# Patient Record
Sex: FEMALE | NOT HISPANIC OR LATINO | Employment: OTHER | URBAN - METROPOLITAN AREA
[De-identification: names, ages, dates, MRNs, and addresses within clinical notes are randomized per-mention and may not be internally consistent; named-entity substitution may affect disease eponyms.]

---

## 2017-01-07 DIAGNOSIS — K21.9 GASTROESOPHAGEAL REFLUX DISEASE WITHOUT ESOPHAGITIS: ICD-10-CM

## 2017-01-07 RX ORDER — OMEPRAZOLE 20 MG/1
CAPSULE, DELAYED RELEASE ORAL
Qty: 180 CAPSULE | Refills: 0 | OUTPATIENT
Start: 2017-01-07

## 2017-05-31 ENCOUNTER — OFFICE VISIT (OUTPATIENT)
Dept: OPTOMETRY | Facility: CLINIC | Age: 72
End: 2017-05-31
Payer: MEDICARE

## 2017-05-31 DIAGNOSIS — H10.13 ALLERGIC CONJUNCTIVITIS, BILATERAL: ICD-10-CM

## 2017-05-31 DIAGNOSIS — H52.7 REFRACTIVE ERROR: ICD-10-CM

## 2017-05-31 DIAGNOSIS — E11.9 TYPE 2 DIABETES MELLITUS WITHOUT RETINOPATHY: Primary | ICD-10-CM

## 2017-05-31 DIAGNOSIS — Z96.1 PSEUDOPHAKIA, BOTH EYES: ICD-10-CM

## 2017-05-31 DIAGNOSIS — H04.123 DRY EYE SYNDROME, BILATERAL: ICD-10-CM

## 2017-05-31 PROCEDURE — 92014 COMPRE OPH EXAM EST PT 1/>: CPT | Mod: S$GLB,,, | Performed by: OPTOMETRIST

## 2017-05-31 PROCEDURE — 99999 PR PBB SHADOW E&M-EST. PATIENT-LVL II: CPT | Mod: PBBFAC,,, | Performed by: OPTOMETRIST

## 2017-05-31 RX ORDER — OLOPATADINE HYDROCHLORIDE 2 MG/ML
1 SOLUTION/ DROPS OPHTHALMIC DAILY PRN
Qty: 1 BOTTLE | Refills: 6 | Status: SHIPPED | OUTPATIENT
Start: 2017-05-31 | End: 2018-02-01 | Stop reason: CLARIF

## 2017-05-31 NOTE — PROGRESS NOTES
Subjective:       Patient ID: Brianne Martin is a 72 y.o. female      Chief Complaint   Patient presents with    Diabetic Eye Exam     IDDM 2; A1c = 9.6 (3/16/15),      History of Present Illness   Dls:  8/3/15 Dr. Medellin    Pt here for diabetic and hypertensive eye exam. She is seeing an outside   doctor for her diabetes. Reports her sugars are doing well. Does not know   most recent A1c reading.  Pt c/o blurry vision at distance and near ou. Pt wears otc readers.   Declines refraction today. Pt c/o dry eyes ou, itching ou off/on,no   burning no pain no ha's no floaters.     Eye meds:  pataday ou prn   otc gtts ou prn     Hemoglobin A1C       Date                     Value               Ref Range           Status                03/16/2015               9.6 (H)             4.5 - 6.2 %         Final                 09/13/2014               8.2 (H)             4.5 - 6.2 %         Final                05/08/2014               9.5 (H)             4.5 - 6.2 %         Final            ----------     Assessment/Plan:     1. Type 2 diabetes mellitus without retinopathy  No diabetic retinopathy. Educated patient on importance of good blood sugar control, compliance with meds, and follow up care with PCP. Return in 1 year for dilated eye exam, sooner PRN.    2. Allergic conjunctivitis, bilateral  Continue pataday QD OU PRN. Refills sent to pharmacy.    - olopatadine (PATADAY) 0.2 % Drop, Place 1 drop into both eyes daily as needed., Disp: 1 Bottle, Rfl: 6    3. Dry eye syndrome, bilateral  Recommend artificial tears. 1 drop 4x per day and PRN. Pt currently using QD OU.Discussed different drop options - AT/PFAT/gel/ointment. Chronicity of disease and treatment discussed.    4. Pseudophakia, both eyes  Well-centered. Clear.     5. Refractive error  Patient declined refraction today, continue with current spectacles.       Return in about 1 year (around 5/31/2018), or if symptoms worsen or fail to improve, for Diabetic  Eye Exam.

## 2018-02-01 NOTE — PROGRESS NOTES
Pt previously taking pataday. Insurance requiring PA. Will switch to pazeo QD OU - listed on formulary.

## 2018-06-21 ENCOUNTER — OFFICE VISIT (OUTPATIENT)
Dept: OPTOMETRY | Facility: CLINIC | Age: 73
End: 2018-06-21
Payer: COMMERCIAL

## 2018-06-21 DIAGNOSIS — E11.9 DIABETIC EYE EXAM: Primary | ICD-10-CM

## 2018-06-21 DIAGNOSIS — H10.13 ALLERGIC CONJUNCTIVITIS, BILATERAL: ICD-10-CM

## 2018-06-21 DIAGNOSIS — Z96.1 PSEUDOPHAKIA, BOTH EYES: ICD-10-CM

## 2018-06-21 DIAGNOSIS — Z01.00 DIABETIC EYE EXAM: Primary | ICD-10-CM

## 2018-06-21 PROCEDURE — 92014 COMPRE OPH EXAM EST PT 1/>: CPT | Mod: S$GLB,,, | Performed by: OPTOMETRIST

## 2018-06-21 PROCEDURE — 99999 PR PBB SHADOW E&M-EST. PATIENT-LVL II: CPT | Mod: PBBFAC,,, | Performed by: OPTOMETRIST

## 2018-06-21 RX ORDER — VENLAFAXINE 75 MG/1
75 TABLET ORAL 2 TIMES DAILY
COMMUNITY
End: 2021-03-17 | Stop reason: SDUPTHER

## 2018-06-21 RX ORDER — ACETAMINOPHEN 500 MG
5000 TABLET ORAL DAILY
COMMUNITY
End: 2020-01-13 | Stop reason: SDUPTHER

## 2018-06-21 RX ORDER — SIMVASTATIN 40 MG/1
40 TABLET, FILM COATED ORAL NIGHTLY
COMMUNITY
End: 2020-01-13 | Stop reason: SDUPTHER

## 2018-06-21 RX ORDER — ASPIRIN 81 MG/1
TABLET ORAL
COMMUNITY
Start: 2018-04-01

## 2018-06-21 RX ORDER — INSULIN GLARGINE 300 [IU]/ML
INJECTION, SOLUTION SUBCUTANEOUS
COMMUNITY
End: 2020-01-13 | Stop reason: SDUPTHER

## 2018-06-21 RX ORDER — GABAPENTIN 300 MG/1
CAPSULE ORAL
COMMUNITY
Start: 2018-05-25 | End: 2020-01-13 | Stop reason: SDUPTHER

## 2018-06-21 RX ORDER — ALIROCUMAB 150 MG/ML
INJECTION, SOLUTION SUBCUTANEOUS
COMMUNITY
Start: 2018-05-26 | End: 2020-01-13 | Stop reason: SDUPTHER

## 2018-06-21 NOTE — PROGRESS NOTES
Subjective:       Patient ID: Brianne Martin is a 73 y.o. female      Chief Complaint   Patient presents with    Diabetic Eye Exam     NIDDM 2, A1c = 9.6 (3/16/18), LBS 98 this AM     History of Present Illness  Last Eye Exam: 5/31/2017 w/ Dr. Cruz.    Pt here for annual diabetic eye exam.    Hemoglobin A1C       Date                     Value               Ref Range           Status                03/16/2015               9.6 (H)             4.5 - 6.2 %         Final                 09/13/2014               8.2 (H)             4.5 - 6.2 %         Final                 05/08/2014               9.5 (H)             4.5 - 6.2 %         Final              (--) Eye pain/discomfort  (+) Blurry Vision   - Pt c/o of blurry vision only at near.  (--) Double Vision  (+) Itchy Eyes   - due to allergies per pt  (+) Watery Eyes   - intermittently  (--) Dry Eyes  (--) Floaters/Black Spots  (--) Headaches  (--) Photophobia  ---------------------------------------------------------------------------    Eye Meds:   - olopatadine hydrochloride ophthalmic solution prn  ---------------------------------------------------------------------------    Glasses: otc readers  Contacts: none     Assessment/Plan:     1. Diabetic eye exam  No diabetic retinopathy. Discussed with pt the effects of diabetes on vision, importance of good blood sugar control, compliance with meds, and follow up care with PCP. Return in 1 year for dilated eye exam, sooner PRN.    2. Allergic conjunctivitis, bilateral  Continue pazeo QD OU PRN.    3. Pseudophakia, both eyes  Well-centered. Clear.   Continue readers PRN. Pt declined refraction.    Follow-up in about 1 year (around 6/21/2019) for Diabetic Eye Exam.

## 2019-08-01 ENCOUNTER — OFFICE VISIT (OUTPATIENT)
Dept: OPTOMETRY | Facility: CLINIC | Age: 74
End: 2019-08-01
Payer: COMMERCIAL

## 2019-08-01 DIAGNOSIS — H52.7 REFRACTIVE ERROR: ICD-10-CM

## 2019-08-01 DIAGNOSIS — Z01.00 DIABETIC EYE EXAM: Primary | ICD-10-CM

## 2019-08-01 DIAGNOSIS — H10.13 ALLERGIC CONJUNCTIVITIS, BILATERAL: ICD-10-CM

## 2019-08-01 DIAGNOSIS — Z96.1 PSEUDOPHAKIA, BOTH EYES: ICD-10-CM

## 2019-08-01 DIAGNOSIS — E11.9 DIABETIC EYE EXAM: Primary | ICD-10-CM

## 2019-08-01 PROCEDURE — 92014 COMPRE OPH EXAM EST PT 1/>: CPT | Mod: S$GLB,,, | Performed by: OPTOMETRIST

## 2019-08-01 PROCEDURE — 92014 PR EYE EXAM, EST PATIENT,COMPREHESV: ICD-10-PCS | Mod: S$GLB,,, | Performed by: OPTOMETRIST

## 2019-08-01 PROCEDURE — 92015 DETERMINE REFRACTIVE STATE: CPT | Mod: S$GLB,,, | Performed by: OPTOMETRIST

## 2019-08-01 PROCEDURE — 99499 UNLISTED E&M SERVICE: CPT | Mod: S$GLB,,, | Performed by: OPTOMETRIST

## 2019-08-01 PROCEDURE — 92015 PR REFRACTION: ICD-10-PCS | Mod: S$GLB,,, | Performed by: OPTOMETRIST

## 2019-08-01 PROCEDURE — 99999 PR PBB SHADOW E&M-EST. PATIENT-LVL II: ICD-10-PCS | Mod: PBBFAC,,, | Performed by: OPTOMETRIST

## 2019-08-01 PROCEDURE — 99499 RISK ADDL DX/OHS AUDIT: ICD-10-PCS | Mod: S$GLB,,, | Performed by: OPTOMETRIST

## 2019-08-01 PROCEDURE — 99999 PR PBB SHADOW E&M-EST. PATIENT-LVL II: CPT | Mod: PBBFAC,,, | Performed by: OPTOMETRIST

## 2019-08-01 NOTE — PROGRESS NOTES
Subjective:       Patient ID: Brianne Martin is a 74 y.o. female      Chief Complaint   Patient presents with    Concerns About Ocular Health    Diabetic Eye Exam     History of Present Illness  Dls: 6/21/18 Dr. Cruz     75 y/o female presents today for diabetic eye exam.   Pt states no changes in vision. Pt wears otc readers.       this am    No tearing  + itching  No burning  No pain  No ha's  No floaters  No flashes    Eye meds  pataday ou prn     Hemoglobin A1C       Date                     Value               Ref Range           Status             03/16/2015               9.6 (H)             4.5 - 6.2 %         Final              09/13/2014               8.2 (H)             4.5 - 6.2 %         Final              05/08/2014               9.5 (H)             4.5 - 6.2 %         Final         ----------      Assessment/Plan:     1. Diabetic eye exam  [facility] vision    No diabetic retinopathy. Discussed with pt the effects of diabetes on vision, importance of good blood sugar control, compliance with meds, and follow up care with PCP. Return in 1 year for dilated eye exam, sooner PRN.    2. Allergic conjunctivitis, bilateral  - olopatadine (PAZEO) 0.7 % Drop; Place 1 drop into both eyes once daily. for 365 doses  Dispense: 2.5 mL; Refill: 6    3. Pseudophakia, both eyes  Well-centered. Clear.     4. Refractive error  Optional Rx. Readers PRN.    Eyeglass Final Rx     Eyeglass Final Rx       Sphere Cylinder Axis Add    Right -0.25 +0.50 150 +2.50    Left -0.25 +0.50 165 +2.50    Expiration Date:  8/1/2020                  Follow up in about 1 year (around 8/1/2020) for Diabetic Eye Exam.

## 2019-11-29 ENCOUNTER — LAB VISIT (OUTPATIENT)
Dept: LAB | Facility: HOSPITAL | Age: 74
End: 2019-11-29
Attending: INTERNAL MEDICINE
Payer: MEDICARE

## 2019-11-29 ENCOUNTER — OFFICE VISIT (OUTPATIENT)
Dept: INTERNAL MEDICINE | Facility: CLINIC | Age: 74
End: 2019-11-29
Payer: MEDICARE

## 2019-11-29 VITALS
DIASTOLIC BLOOD PRESSURE: 78 MMHG | HEART RATE: 80 BPM | RESPIRATION RATE: 16 BRPM | SYSTOLIC BLOOD PRESSURE: 122 MMHG | BODY MASS INDEX: 28.56 KG/M2 | TEMPERATURE: 99 F | WEIGHT: 155.19 LBS | HEIGHT: 62 IN

## 2019-11-29 DIAGNOSIS — E11.59 HYPERTENSION ASSOCIATED WITH DIABETES: ICD-10-CM

## 2019-11-29 DIAGNOSIS — E11.9 TYPE 2 DIABETES MELLITUS WITHOUT COMPLICATION, WITH LONG-TERM CURRENT USE OF INSULIN: ICD-10-CM

## 2019-11-29 DIAGNOSIS — Z79.4 TYPE 2 DIABETES MELLITUS WITHOUT COMPLICATION, WITH LONG-TERM CURRENT USE OF INSULIN: Primary | ICD-10-CM

## 2019-11-29 DIAGNOSIS — E11.69 HYPERLIPIDEMIA ASSOCIATED WITH TYPE 2 DIABETES MELLITUS: ICD-10-CM

## 2019-11-29 DIAGNOSIS — I15.2 HYPERTENSION ASSOCIATED WITH DIABETES: ICD-10-CM

## 2019-11-29 DIAGNOSIS — K21.9 GASTROESOPHAGEAL REFLUX DISEASE WITHOUT ESOPHAGITIS: ICD-10-CM

## 2019-11-29 DIAGNOSIS — Z79.4 TYPE 2 DIABETES MELLITUS WITHOUT COMPLICATION, WITH LONG-TERM CURRENT USE OF INSULIN: ICD-10-CM

## 2019-11-29 DIAGNOSIS — E78.5 HYPERLIPIDEMIA ASSOCIATED WITH TYPE 2 DIABETES MELLITUS: ICD-10-CM

## 2019-11-29 DIAGNOSIS — F41.8 MIXED ANXIETY AND DEPRESSIVE DISORDER: ICD-10-CM

## 2019-11-29 DIAGNOSIS — M81.0 OSTEOPOROSIS, POSTMENOPAUSAL: ICD-10-CM

## 2019-11-29 DIAGNOSIS — R13.10 DYSPHAGIA, UNSPECIFIED TYPE: ICD-10-CM

## 2019-11-29 DIAGNOSIS — E11.9 TYPE 2 DIABETES MELLITUS WITHOUT COMPLICATION, WITH LONG-TERM CURRENT USE OF INSULIN: Primary | ICD-10-CM

## 2019-11-29 LAB
ALBUMIN SERPL BCP-MCNC: 3.9 G/DL (ref 3.5–5.2)
ALBUMIN/CREAT UR: 14.5 UG/MG (ref 0–30)
ALP SERPL-CCNC: 56 U/L (ref 55–135)
ALT SERPL W/O P-5'-P-CCNC: 34 U/L (ref 10–44)
ANION GAP SERPL CALC-SCNC: 6 MMOL/L (ref 8–16)
AST SERPL-CCNC: 34 U/L (ref 10–40)
BASOPHILS # BLD AUTO: 0.04 K/UL (ref 0–0.2)
BASOPHILS NFR BLD: 0.6 % (ref 0–1.9)
BILIRUB SERPL-MCNC: 0.4 MG/DL (ref 0.1–1)
BUN SERPL-MCNC: 18 MG/DL (ref 8–23)
CALCIUM SERPL-MCNC: 9.7 MG/DL (ref 8.7–10.5)
CHLORIDE SERPL-SCNC: 106 MMOL/L (ref 95–110)
CHOLEST SERPL-MCNC: 135 MG/DL (ref 120–199)
CHOLEST/HDLC SERPL: 2.9 {RATIO} (ref 2–5)
CO2 SERPL-SCNC: 29 MMOL/L (ref 23–29)
CREAT SERPL-MCNC: 0.8 MG/DL (ref 0.5–1.4)
CREAT UR-MCNC: 55 MG/DL (ref 15–325)
DIFFERENTIAL METHOD: ABNORMAL
EOSINOPHIL # BLD AUTO: 0.2 K/UL (ref 0–0.5)
EOSINOPHIL NFR BLD: 3.5 % (ref 0–8)
ERYTHROCYTE [DISTWIDTH] IN BLOOD BY AUTOMATED COUNT: 14.2 % (ref 11.5–14.5)
EST. GFR  (AFRICAN AMERICAN): >60 ML/MIN/1.73 M^2
EST. GFR  (NON AFRICAN AMERICAN): >60 ML/MIN/1.73 M^2
ESTIMATED AVG GLUCOSE: 120 MG/DL (ref 68–131)
GLUCOSE SERPL-MCNC: 82 MG/DL (ref 70–110)
HBA1C MFR BLD HPLC: 5.8 % (ref 4–5.6)
HCT VFR BLD AUTO: 39.8 % (ref 37–48.5)
HDLC SERPL-MCNC: 46 MG/DL (ref 40–75)
HDLC SERPL: 34.1 % (ref 20–50)
HGB BLD-MCNC: 11.9 G/DL (ref 12–16)
IMM GRANULOCYTES # BLD AUTO: 0.02 K/UL (ref 0–0.04)
IMM GRANULOCYTES NFR BLD AUTO: 0.3 % (ref 0–0.5)
LDLC SERPL CALC-MCNC: 66.6 MG/DL (ref 63–159)
LYMPHOCYTES # BLD AUTO: 2.2 K/UL (ref 1–4.8)
LYMPHOCYTES NFR BLD: 34 % (ref 18–48)
MCH RBC QN AUTO: 27.7 PG (ref 27–31)
MCHC RBC AUTO-ENTMCNC: 29.9 G/DL (ref 32–36)
MCV RBC AUTO: 93 FL (ref 82–98)
MICROALBUMIN UR DL<=1MG/L-MCNC: 8 UG/ML
MONOCYTES # BLD AUTO: 0.4 K/UL (ref 0.3–1)
MONOCYTES NFR BLD: 6.3 % (ref 4–15)
NEUTROPHILS # BLD AUTO: 3.5 K/UL (ref 1.8–7.7)
NEUTROPHILS NFR BLD: 55.3 % (ref 38–73)
NONHDLC SERPL-MCNC: 89 MG/DL
NRBC BLD-RTO: 0 /100 WBC
PLATELET # BLD AUTO: 249 K/UL (ref 150–350)
PMV BLD AUTO: 11.6 FL (ref 9.2–12.9)
POTASSIUM SERPL-SCNC: 4.2 MMOL/L (ref 3.5–5.1)
PROT SERPL-MCNC: 7.2 G/DL (ref 6–8.4)
RBC # BLD AUTO: 4.29 M/UL (ref 4–5.4)
SODIUM SERPL-SCNC: 141 MMOL/L (ref 136–145)
TRIGL SERPL-MCNC: 112 MG/DL (ref 30–150)
WBC # BLD AUTO: 6.33 K/UL (ref 3.9–12.7)

## 2019-11-29 PROCEDURE — 85025 COMPLETE CBC W/AUTO DIFF WBC: CPT | Mod: HCNC

## 2019-11-29 PROCEDURE — 3078F PR MOST RECENT DIASTOLIC BLOOD PRESSURE < 80 MM HG: ICD-10-PCS | Mod: HCNC,CPTII,S$GLB, | Performed by: INTERNAL MEDICINE

## 2019-11-29 PROCEDURE — 3074F SYST BP LT 130 MM HG: CPT | Mod: HCNC,CPTII,S$GLB, | Performed by: INTERNAL MEDICINE

## 2019-11-29 PROCEDURE — 99999 PR PBB SHADOW E&M-EST. PATIENT-LVL III: ICD-10-PCS | Mod: PBBFAC,HCNC,, | Performed by: INTERNAL MEDICINE

## 2019-11-29 PROCEDURE — 3074F PR MOST RECENT SYSTOLIC BLOOD PRESSURE < 130 MM HG: ICD-10-PCS | Mod: HCNC,CPTII,S$GLB, | Performed by: INTERNAL MEDICINE

## 2019-11-29 PROCEDURE — 82043 UR ALBUMIN QUANTITATIVE: CPT | Mod: HCNC

## 2019-11-29 PROCEDURE — 83036 HEMOGLOBIN GLYCOSYLATED A1C: CPT | Mod: HCNC

## 2019-11-29 PROCEDURE — 1101F PT FALLS ASSESS-DOCD LE1/YR: CPT | Mod: HCNC,CPTII,S$GLB, | Performed by: INTERNAL MEDICINE

## 2019-11-29 PROCEDURE — 99999 PR PBB SHADOW E&M-EST. PATIENT-LVL III: CPT | Mod: PBBFAC,HCNC,, | Performed by: INTERNAL MEDICINE

## 2019-11-29 PROCEDURE — 99204 PR OFFICE/OUTPT VISIT, NEW, LEVL IV, 45-59 MIN: ICD-10-PCS | Mod: HCNC,S$GLB,, | Performed by: INTERNAL MEDICINE

## 2019-11-29 PROCEDURE — 36415 COLL VENOUS BLD VENIPUNCTURE: CPT | Mod: HCNC,PO

## 2019-11-29 PROCEDURE — 1126F PR PAIN SEVERITY QUANTIFIED, NO PAIN PRESENT: ICD-10-PCS | Mod: HCNC,S$GLB,, | Performed by: INTERNAL MEDICINE

## 2019-11-29 PROCEDURE — 1101F PR PT FALLS ASSESS DOC 0-1 FALLS W/OUT INJ PAST YR: ICD-10-PCS | Mod: HCNC,CPTII,S$GLB, | Performed by: INTERNAL MEDICINE

## 2019-11-29 PROCEDURE — 99204 OFFICE O/P NEW MOD 45 MIN: CPT | Mod: HCNC,S$GLB,, | Performed by: INTERNAL MEDICINE

## 2019-11-29 PROCEDURE — 1159F PR MEDICATION LIST DOCUMENTED IN MEDICAL RECORD: ICD-10-PCS | Mod: HCNC,S$GLB,, | Performed by: INTERNAL MEDICINE

## 2019-11-29 PROCEDURE — 1126F AMNT PAIN NOTED NONE PRSNT: CPT | Mod: HCNC,S$GLB,, | Performed by: INTERNAL MEDICINE

## 2019-11-29 PROCEDURE — 80053 COMPREHEN METABOLIC PANEL: CPT | Mod: HCNC

## 2019-11-29 PROCEDURE — 80061 LIPID PANEL: CPT | Mod: HCNC

## 2019-11-29 PROCEDURE — 1159F MED LIST DOCD IN RCRD: CPT | Mod: HCNC,S$GLB,, | Performed by: INTERNAL MEDICINE

## 2019-11-29 PROCEDURE — 3078F DIAST BP <80 MM HG: CPT | Mod: HCNC,CPTII,S$GLB, | Performed by: INTERNAL MEDICINE

## 2019-11-29 RX ORDER — METFORMIN HYDROCHLORIDE 1000 MG/1
TABLET ORAL
COMMUNITY
Start: 2018-09-23 | End: 2019-11-29 | Stop reason: SDUPTHER

## 2019-11-29 RX ORDER — OMEPRAZOLE 40 MG/1
40 CAPSULE, DELAYED RELEASE ORAL DAILY
Qty: 30 CAPSULE | Refills: 11 | Status: SHIPPED | OUTPATIENT
Start: 2019-11-29 | End: 2020-12-03

## 2019-11-29 RX ORDER — VALSARTAN 320 MG/1
TABLET ORAL
COMMUNITY
Start: 2018-08-02 | End: 2020-01-13 | Stop reason: SDUPTHER

## 2019-11-29 NOTE — PROGRESS NOTES
Subjective:       Patient ID: Brianne Martin is a 74 y.o. female.    Chief Complaint: Establish Care    HPI Patient is a 74-year-old female presenting days new patient.  She has previously been followed by outside physicians.  Patient relates a history of type 2 diabetes, hypertension, hyperlipidemia, GERD, osteoporosis and some dysphagia.    Patient relates being diabetic for many years.  She is on insulin at this time.  She was previously followed by an endocrinologist as well as the primary care.  She has not had any significant problems she states.  She states her sugars have been under control.  She does not relate what exactly her last A1c may have been.    Patient has a history of hypertension associated with diabetes as well as hyperlipidemia associated with diabetes.  She is on medications and her blood pressure shows good control.  She has not had any significant problems tolerating the medications.  As her believe that her cholesterol numbers are good as well.    She has been under treatment for anxiety and depression.  This seems to be well controlled.  She has no issues with the medications.    She has had problems with reflux.  She was previously on omeprazole.  It is not on her medicine list at this time.  She states that she does take the omeprazole and it is beneficial for her.  We will add that back to her medicine list.    She has had a history of osteoporosis.  She was on Reclast for this.  It did not seem to be working as well as she would have hoped that they switched her to Prolia.  She just had a Prolia injection about 2 weeks ago.    Review of Systems   Constitutional: Negative for chills and fever.   HENT: Negative for hearing loss.    Eyes: Negative for photophobia and visual disturbance.   Respiratory: Negative for cough, shortness of breath and wheezing.    Cardiovascular: Negative for chest pain and palpitations.   Gastrointestinal: Negative for blood in stool, constipation, nausea and  "vomiting.   Genitourinary: Negative for dysuria and hematuria.   Musculoskeletal: Negative for neck pain and neck stiffness.   Skin: Negative for rash.   Neurological: Negative for syncope, weakness, light-headedness and headaches.   Hematological: Negative for adenopathy.   Psychiatric/Behavioral: Negative for dysphoric mood. The patient is not nervous/anxious.        Objective:   /78 (BP Location: Left arm, Patient Position: Sitting)   Pulse 80   Temp 98.6 °F (37 °C) (Oral)   Resp 16   Ht 5' 2" (1.575 m)   Wt 70.4 kg (155 lb 3.3 oz)   BMI 28.39 kg/m²      Physical Exam   Constitutional: She is oriented to person, place, and time. She appears well-developed and well-nourished.   HENT:   Head: Normocephalic and atraumatic.   Eyes: Pupils are equal, round, and reactive to light. EOM are normal.   Neck: Normal range of motion. Neck supple. No thyromegaly present.   Cardiovascular: Normal rate, regular rhythm and intact distal pulses. Exam reveals no gallop and no friction rub.   No murmur heard.  Pulmonary/Chest: Breath sounds normal. She has no wheezes. She has no rales. She exhibits no tenderness.   Abdominal: Soft. Bowel sounds are normal. She exhibits no distension and no mass. There is no tenderness. There is no rebound and no guarding.   Musculoskeletal: She exhibits no edema.   Lymphadenopathy:     She has no cervical adenopathy.   Neurological: She is alert and oriented to person, place, and time. She has normal reflexes. She displays normal reflexes. No cranial nerve deficit.   Skin: Skin is warm and dry. No rash noted.   Psychiatric: She has a normal mood and affect. Her behavior is normal. Judgment normal.   Vitals reviewed.          Assessment:       1. Type 2 diabetes mellitus without complication, with long-term current use of insulin    2. Hypertension associated with diabetes    3. Hyperlipidemia associated with type 2 diabetes mellitus    4. Mixed anxiety and depressive disorder    5. " Gastroesophageal reflux disease without esophagitis    6. Dysphagia, unspecified type    7. Osteoporosis, postmenopausal        Plan:   No problem-specific Assessment & Plan notes found for this encounter.    Type 2 diabetes mellitus without complication, with long-term current use of insulin  Comments:  update a1c, continue current meds   Orders:  -     CBC auto differential; Future; Expected date: 11/29/2019  -     Comprehensive metabolic panel; Future; Expected date: 11/29/2019  -     Hemoglobin A1c; Future; Expected date: 11/29/2019  -     Lipid panel; Future; Expected date: 11/29/2019  -     Microalbumin/creatinine urine ratio    Hypertension associated with diabetes  Comments:  good control.  stable.    Hyperlipidemia associated with type 2 diabetes mellitus  Comments:  continue simvastatin. update labs    Mixed anxiety and depressive disorder    Gastroesophageal reflux disease without esophagitis  Comments:  resume omeprazole 40 mg once daily  Orders:  -     omeprazole (PRILOSEC) 40 MG capsule; Take 1 capsule (40 mg total) by mouth once daily.  Dispense: 30 capsule; Refill: 11    Dysphagia, unspecified type  Comments:  Modified barium swallow  Orders:  -     Fl Modified Barium Swallow Speech; Future; Expected date: 11/29/2019  -     SLP video swallow; Future    Osteoporosis, postmenopausal  Comments:  On reclast,  just had infusion 2 weeks ago.     We will continue her current regimen at this time.  We will hold off on referral to Endocrinology or Rheumatology until we see more data with the lab work.  She is describe some issues with dysphagia so we will get a modified barium swallow to see what is going on there.  This may just be related to the reflux or the possible she could have little hiatal hernia.  We will see what the study shows as.  We will see her back in 1 month.    I discussed with the patient and her daughter my clinical partnership with FRITZ Smith.  We talked about team based care and  they expressed understanding.      Follow up in about 4 weeks (around 12/27/2019).

## 2019-12-30 ENCOUNTER — TELEPHONE (OUTPATIENT)
Dept: INTERNAL MEDICINE | Facility: CLINIC | Age: 74
End: 2019-12-30

## 2019-12-30 ENCOUNTER — HOSPITAL ENCOUNTER (OUTPATIENT)
Dept: RADIOLOGY | Facility: HOSPITAL | Age: 74
Discharge: HOME | End: 2019-12-30
Attending: INTERNAL MEDICINE
Payer: MEDICARE

## 2019-12-30 DIAGNOSIS — R13.10 DYSPHAGIA, UNSPECIFIED TYPE: ICD-10-CM

## 2019-12-30 DIAGNOSIS — R13.10 DYSPHAGIA, UNSPECIFIED TYPE: Primary | ICD-10-CM

## 2019-12-30 PROCEDURE — 92611 MOTION FLUOROSCOPY/SWALLOW: CPT | Mod: HCNC

## 2019-12-30 PROCEDURE — 74230 X-RAY XM SWLNG FUNCJ C+: CPT | Mod: TC,HCNC

## 2019-12-30 PROCEDURE — 25500020 PHARM REV CODE 255: Mod: HCNC | Performed by: INTERNAL MEDICINE

## 2019-12-30 PROCEDURE — A9698 NON-RAD CONTRAST MATERIALNOC: HCPCS | Mod: HCNC | Performed by: INTERNAL MEDICINE

## 2019-12-30 RX ADMIN — BARIUM SULFATE 20 G: 960 POWDER, FOR SUSPENSION ORAL at 11:12

## 2019-12-30 NOTE — PROCEDURES
Modified Barium Swallow    Patient Name:  Brianne Martin   MRN:  [id number]      Recommendations:     Recommendations:                General Recommendations:  Dysphagia therapy  Diet recommendations:   ,   regular, thin  Aspiration Precautions: 1 bite/sip at a time, Alternating bites/sips, Double swallow with each bite/sip, HOB to 90 degrees and Small bites/sips   General Precautions: Standard,    Communication strategies:  none    Referral     Reason for Referral  Patient was referred for a Modified Barium Swallow Study to assess the efficiency of his/her swallow function, rule out aspiration and make recommendations regarding safe dietary consistencies, effective compensatory strategies, and safe eating environment.     Diagnosis: Pt c/o having a lump in her throat at all times, night and day      History:     Past Medical History:   Diagnosis Date    Arthritis 12/10/2014    Elevated LFTs 2012    negative workup    Hyperlipidemia LDL goal <100     Hypertension     Mixed anxiety and depressive disorder     Osteoporosis, postmenopausal     Overweight(278.02)     Reflux     Traumatic closed displaced bimalleolar fracture of right ankle 12/25/2013    Type II or unspecified type diabetes mellitus without mention of complication, uncontrolled     Vitamin D deficiency disease        Objective:     Current Respiratory Status: 12/30/19    Alert: yes    Cooperative: yes    Follows Directions: yes    Visualization  · Patient was seen in the lateral view    Oral Peripheral Examination  ·      Consistencies Assesse  · Thin liquids, puree, solids    Oral Preparation/Oral Phase  · WFL- Pt with adequate bolus acceptance, containment, control and timely A-P transfer across consistencies     Pharyngeal Phase   Decreased laryngeal elevation  Mild vallecular residue with puree and solids which clears with 2 swallows    Cervical Esophageal Phase  · Esophageal residue  · Cricopharyngeal bar    Assessment:     Impressions  ·   Pt presents with mild pharyngeal dysphagia characterized by reduced laryngeal elevation resulting in mild vallecular residue which clears with consecutive swallow . No penetration or aspiration were visualized during this study.     Prognosis: Excellent    Barriers:  · None    Plan  Speech therapy to improve laryngeal elevation and reduce pharyngeal residue    Education  Results were discussed with patient.    Goals:       Plan:   · Patient to be seen:      · Plan of Care expires:     · Plan of Care reviewed with:           Discharge recommendations:      Barriers to Discharge:  None    Time Tracking:   SLP Treatment Date:   12/30/19  Speech Start Time:  1015  Speech Stop Time:  1035     Speech Total Time (min):  20 min    Mary Delgado CCC-SLP  12/30/2019

## 2019-12-30 NOTE — TELEPHONE ENCOUNTER
Patient was informed of her results via phone.  Patient verbalized understanding.  Notified speech therapy will reach out to patient for scheduling.

## 2020-01-10 ENCOUNTER — TELEPHONE (OUTPATIENT)
Dept: INTERNAL MEDICINE | Facility: CLINIC | Age: 75
End: 2020-01-10

## 2020-01-10 DIAGNOSIS — R13.10 DYSPHAGIA, UNSPECIFIED TYPE: Primary | ICD-10-CM

## 2020-01-10 NOTE — TELEPHONE ENCOUNTER
----- Message from Mony Chao MA sent at 1/10/2020  8:33 AM CST -----  Regarding: MBSS  Good Morning,        The patient has order to have a swallow study .The SLP video order is in, but I will need another other as well. The other order I will need is a Fl. Modified Swallow Study. Can you please put this order in?    Thank You,  LUZ MARIA Sykes

## 2020-01-13 ENCOUNTER — OFFICE VISIT (OUTPATIENT)
Dept: INTERNAL MEDICINE | Facility: CLINIC | Age: 75
End: 2020-01-13
Payer: MEDICARE

## 2020-01-13 VITALS
BODY MASS INDEX: 28.23 KG/M2 | TEMPERATURE: 98 F | HEART RATE: 71 BPM | SYSTOLIC BLOOD PRESSURE: 126 MMHG | DIASTOLIC BLOOD PRESSURE: 64 MMHG | WEIGHT: 154.31 LBS

## 2020-01-13 DIAGNOSIS — K11.8 SUBMANDIBULAR GLAND TENDERNESS: ICD-10-CM

## 2020-01-13 DIAGNOSIS — E11.9 TYPE 2 DIABETES MELLITUS WITHOUT COMPLICATION, WITH LONG-TERM CURRENT USE OF INSULIN: ICD-10-CM

## 2020-01-13 DIAGNOSIS — H10.13 ALLERGIC CONJUNCTIVITIS, BILATERAL: ICD-10-CM

## 2020-01-13 DIAGNOSIS — Z79.4 TYPE 2 DIABETES MELLITUS WITHOUT COMPLICATION, WITH LONG-TERM CURRENT USE OF INSULIN: ICD-10-CM

## 2020-01-13 DIAGNOSIS — E11.69 HYPERLIPIDEMIA ASSOCIATED WITH TYPE 2 DIABETES MELLITUS: ICD-10-CM

## 2020-01-13 DIAGNOSIS — E78.5 HYPERLIPIDEMIA ASSOCIATED WITH TYPE 2 DIABETES MELLITUS: ICD-10-CM

## 2020-01-13 DIAGNOSIS — M81.0 OSTEOPOROSIS, POSTMENOPAUSAL: ICD-10-CM

## 2020-01-13 PROCEDURE — 1126F PR PAIN SEVERITY QUANTIFIED, NO PAIN PRESENT: ICD-10-PCS | Mod: HCNC,S$GLB,, | Performed by: INTERNAL MEDICINE

## 2020-01-13 PROCEDURE — 1101F PR PT FALLS ASSESS DOC 0-1 FALLS W/OUT INJ PAST YR: ICD-10-PCS | Mod: HCNC,CPTII,S$GLB, | Performed by: INTERNAL MEDICINE

## 2020-01-13 PROCEDURE — 99999 PR PBB SHADOW E&M-EST. PATIENT-LVL III: CPT | Mod: PBBFAC,HCNC,, | Performed by: INTERNAL MEDICINE

## 2020-01-13 PROCEDURE — 99999 PR PBB SHADOW E&M-EST. PATIENT-LVL III: ICD-10-PCS | Mod: PBBFAC,HCNC,, | Performed by: INTERNAL MEDICINE

## 2020-01-13 PROCEDURE — 99214 OFFICE O/P EST MOD 30 MIN: CPT | Mod: HCNC,S$GLB,, | Performed by: INTERNAL MEDICINE

## 2020-01-13 PROCEDURE — 3078F DIAST BP <80 MM HG: CPT | Mod: HCNC,CPTII,S$GLB, | Performed by: INTERNAL MEDICINE

## 2020-01-13 PROCEDURE — 99499 RISK ADDL DX/OHS AUDIT: ICD-10-PCS | Mod: HCNC,S$GLB,, | Performed by: INTERNAL MEDICINE

## 2020-01-13 PROCEDURE — 3074F PR MOST RECENT SYSTOLIC BLOOD PRESSURE < 130 MM HG: ICD-10-PCS | Mod: HCNC,CPTII,S$GLB, | Performed by: INTERNAL MEDICINE

## 2020-01-13 PROCEDURE — 1101F PT FALLS ASSESS-DOCD LE1/YR: CPT | Mod: HCNC,CPTII,S$GLB, | Performed by: INTERNAL MEDICINE

## 2020-01-13 PROCEDURE — 3044F HG A1C LEVEL LT 7.0%: CPT | Mod: HCNC,CPTII,S$GLB, | Performed by: INTERNAL MEDICINE

## 2020-01-13 PROCEDURE — 1126F AMNT PAIN NOTED NONE PRSNT: CPT | Mod: HCNC,S$GLB,, | Performed by: INTERNAL MEDICINE

## 2020-01-13 PROCEDURE — 1159F MED LIST DOCD IN RCRD: CPT | Mod: HCNC,S$GLB,, | Performed by: INTERNAL MEDICINE

## 2020-01-13 PROCEDURE — 99214 PR OFFICE/OUTPT VISIT, EST, LEVL IV, 30-39 MIN: ICD-10-PCS | Mod: HCNC,S$GLB,, | Performed by: INTERNAL MEDICINE

## 2020-01-13 PROCEDURE — 3044F PR MOST RECENT HEMOGLOBIN A1C LEVEL <7.0%: ICD-10-PCS | Mod: HCNC,CPTII,S$GLB, | Performed by: INTERNAL MEDICINE

## 2020-01-13 PROCEDURE — 3078F PR MOST RECENT DIASTOLIC BLOOD PRESSURE < 80 MM HG: ICD-10-PCS | Mod: HCNC,CPTII,S$GLB, | Performed by: INTERNAL MEDICINE

## 2020-01-13 PROCEDURE — 1159F PR MEDICATION LIST DOCUMENTED IN MEDICAL RECORD: ICD-10-PCS | Mod: HCNC,S$GLB,, | Performed by: INTERNAL MEDICINE

## 2020-01-13 PROCEDURE — 99499 UNLISTED E&M SERVICE: CPT | Mod: HCNC,S$GLB,, | Performed by: INTERNAL MEDICINE

## 2020-01-13 PROCEDURE — 3074F SYST BP LT 130 MM HG: CPT | Mod: HCNC,CPTII,S$GLB, | Performed by: INTERNAL MEDICINE

## 2020-01-13 RX ORDER — INSULIN ASPART 100 [IU]/ML
12-22 INJECTION, SOLUTION INTRAVENOUS; SUBCUTANEOUS
Qty: 12 ML | Refills: 11 | Status: SHIPPED | OUTPATIENT
Start: 2020-01-13 | End: 2021-03-17 | Stop reason: SDUPTHER

## 2020-01-13 RX ORDER — ACETAMINOPHEN 500 MG
5000 TABLET ORAL DAILY
Qty: 90 TABLET | Refills: 3 | Status: SHIPPED | OUTPATIENT
Start: 2020-01-13 | End: 2020-08-18

## 2020-01-13 RX ORDER — VALSARTAN 320 MG/1
320 TABLET ORAL DAILY
Qty: 90 TABLET | Refills: 3 | Status: SHIPPED | OUTPATIENT
Start: 2020-01-13 | End: 2020-08-18

## 2020-01-13 RX ORDER — SIMVASTATIN 40 MG/1
40 TABLET, FILM COATED ORAL NIGHTLY
Qty: 90 TABLET | Refills: 3 | Status: SHIPPED | OUTPATIENT
Start: 2020-01-13 | End: 2020-08-18

## 2020-01-13 RX ORDER — GABAPENTIN 300 MG/1
300 CAPSULE ORAL 2 TIMES DAILY
Qty: 180 CAPSULE | Refills: 3 | Status: SHIPPED | OUTPATIENT
Start: 2020-01-13 | End: 2020-12-22

## 2020-01-13 RX ORDER — METFORMIN HYDROCHLORIDE 1000 MG/1
1000 TABLET ORAL 2 TIMES DAILY WITH MEALS
Qty: 180 TABLET | Refills: 0 | Status: SHIPPED | OUTPATIENT
Start: 2020-01-13 | End: 2020-06-09

## 2020-01-13 RX ORDER — AMLODIPINE BESYLATE 10 MG/1
10 TABLET ORAL DAILY
Qty: 90 TABLET | Refills: 3 | Status: SHIPPED | OUTPATIENT
Start: 2020-01-13 | End: 2020-08-18

## 2020-01-13 RX ORDER — INSULIN GLARGINE 300 [IU]/ML
30 INJECTION, SOLUTION SUBCUTANEOUS DAILY
Qty: 9 ML | Refills: 11 | Status: SHIPPED | OUTPATIENT
Start: 2020-01-13 | End: 2021-03-17 | Stop reason: SDUPTHER

## 2020-01-13 RX ORDER — ALIROCUMAB 150 MG/ML
150 INJECTION, SOLUTION SUBCUTANEOUS
Qty: 3 ML | Refills: 3 | Status: SHIPPED | OUTPATIENT
Start: 2020-01-13 | End: 2020-03-17 | Stop reason: ALTCHOICE

## 2020-01-13 NOTE — PROGRESS NOTES
Subjective:       Patient ID: Brianne Martin is a 75 y.o. female.    Chief Complaint: Follow-up    HPI Patient is a 75-year-old female coming today following up for diabetes, hyperlipidemia.  Her diabetes is doing well.  Her last A1c was excellent.  She is tolerating medication well without adverse effects.    Cholesterol has been stable.  She has been intolerant of pravastatin and simvastatin.  Prior doctor had her on repatha which is not covered with her insurance.  She would like to try Praluent.      She complains of some right-sided discomfort under her jaw.  She states she feels and bump there which is little bit tender.  This has been going on for over a year.  She notes no issues otherwise with it.    Patient has been on Prolia for osteoporosis.  She will need to be establishing Rheumatology continue this treatment.    Review of Systems   Constitutional: Negative for chills and fever.   Respiratory: Negative for cough, shortness of breath and wheezing.    Cardiovascular: Negative for chest pain and palpitations.   Gastrointestinal: Negative for blood in stool, constipation, nausea and vomiting.   Endocrine: Negative for polydipsia and polyphagia.   Genitourinary: Negative for dysuria and hematuria.   Skin: Negative for pallor and rash.   Neurological: Negative for dizziness, tremors, seizures, speech difficulty and headaches.   Psychiatric/Behavioral: Negative for confusion. The patient is not nervous/anxious.        Objective:   /64   Pulse 71   Temp 98.3 °F (36.8 °C)   Wt 70 kg (154 lb 5.2 oz)   BMI 28.23 kg/m²      Physical Exam   Constitutional: She appears well-developed and well-nourished.   HENT:   Head: Normocephalic and atraumatic.   Palpation of the neck reveals no abnormalities.  The area she results as tender is at the area the submandibular gland on the right.  It appears normal in shape and texture.   Cardiovascular: Normal rate, regular rhythm and intact distal pulses. Exam reveals  no gallop and no friction rub.   No murmur heard.  Pulmonary/Chest: Breath sounds normal. She has no wheezes. She has no rales. She exhibits no tenderness.   Abdominal: Soft. Bowel sounds are normal. She exhibits no distension. There is no tenderness.   Lymphadenopathy:     She has no cervical adenopathy.   Skin: No rash noted.   Vitals reviewed.      No visits with results within 2 Week(s) from this visit.   Latest known visit with results is:   Lab Visit on 11/29/2019   Component Date Value    WBC 11/29/2019 6.33     RBC 11/29/2019 4.29     Hemoglobin 11/29/2019 11.9*    Hematocrit 11/29/2019 39.8     Mean Corpuscular Volume 11/29/2019 93     Mean Corpuscular Hemoglo* 11/29/2019 27.7     Mean Corpuscular Hemoglo* 11/29/2019 29.9*    RDW 11/29/2019 14.2     Platelets 11/29/2019 249     MPV 11/29/2019 11.6     Immature Granulocytes 11/29/2019 0.3     Gran # (ANC) 11/29/2019 3.5     Immature Grans (Abs) 11/29/2019 0.02     Lymph # 11/29/2019 2.2     Mono # 11/29/2019 0.4     Eos # 11/29/2019 0.2     Baso # 11/29/2019 0.04     nRBC 11/29/2019 0     Gran% 11/29/2019 55.3     Lymph% 11/29/2019 34.0     Mono% 11/29/2019 6.3     Eosinophil% 11/29/2019 3.5     Basophil% 11/29/2019 0.6     Differential Method 11/29/2019 Automated     Sodium 11/29/2019 141     Potassium 11/29/2019 4.2     Chloride 11/29/2019 106     CO2 11/29/2019 29     Glucose 11/29/2019 82     BUN, Bld 11/29/2019 18     Creatinine 11/29/2019 0.8     Calcium 11/29/2019 9.7     Total Protein 11/29/2019 7.2     Albumin 11/29/2019 3.9     Total Bilirubin 11/29/2019 0.4     Alkaline Phosphatase 11/29/2019 56     AST 11/29/2019 34     ALT 11/29/2019 34     Anion Gap 11/29/2019 6*    eGFR if African American 11/29/2019 >60.0     eGFR if non African Amer* 11/29/2019 >60.0     Hemoglobin A1C 11/29/2019 5.8*    Estimated Avg Glucose 11/29/2019 120     Cholesterol 11/29/2019 135     Triglycerides 11/29/2019 112     HDL  11/29/2019 46     LDL Cholesterol 11/29/2019 66.6     Hdl/Cholesterol Ratio 11/29/2019 34.1     Total Cholesterol/HDL Ra* 11/29/2019 2.9     Non-HDL Cholesterol 11/29/2019 89        Assessment:       1. Type 2 diabetes mellitus without complication, with long-term current use of insulin    2. DM (diabetes mellitus) type II uncontrolled with eye manifestation    3. Allergic conjunctivitis, bilateral    4. Hyperlipidemia associated with type 2 diabetes mellitus    5. Submandibular gland tenderness    6. Osteoporosis, postmenopausal        Plan:   No problem-specific Assessment & Plan notes found for this encounter.    Type 2 diabetes mellitus without complication, with long-term current use of insulin  Comments:  Sugars look good on most recent labs.  Continue current meds. stable.  Orders:  -     metFORMIN (GLUCOPHAGE) 1000 MG tablet; Take 1 tablet (1,000 mg total) by mouth 2 (two) times daily with meals.  Dispense: 180 tablet; Refill: 0    DM (diabetes mellitus) type II uncontrolled with eye manifestation  -     insulin aspart U-100 (NOVOLOG FLEXPEN U-100 INSULIN) 100 unit/mL (3 mL) InPn pen; Inject 12-22 Units into the skin 3 (three) times daily with meals. 1 units with each meal  Dispense: 12 mL; Refill: 11    Allergic conjunctivitis, bilateral  Comments:  continue drops as needed.  Orders:  -     olopatadine (PAZEO) 0.7 % Drop; Place 1 drop into both eyes once daily. for 365 doses  Dispense: 2.5 mL; Refill: 6    Hyperlipidemia associated with type 2 diabetes mellitus  Comments:  Patient has had intolernace to simvastatin and pravastatin.  Repatha is not covered.  Will try praluent.  Orders:  -     Hemoglobin A1c; Future; Expected date: 05/12/2020  -     Lipid panel; Future; Expected date: 05/12/2020    Submandibular gland tenderness  Comments:  ultrasound soft tissues neck to assess  Orders:  -     US Soft Tissue Head Neck Thyroid; Future; Expected date: 01/13/2020    Osteoporosis,  postmenopausal  Comments:  ON Prolia.  will refer to rheumatology  Orders:  -     Ambulatory consult to Rheumatology    Other orders  -     PRALUENT  mg/mL PnIj; Inject 1 mL (150 mg total) into the skin every 28 days.  Dispense: 3 mL; Refill: 3  -     Cancel: denosumab (PROLIA) 60 mg/mL Syrg; Inject 1 mL (60 mg total) into the skin every 6 (six) months.  Dispense: 2 mL; Refill: 3  -     insulin glargine U-300 conc (TOUJEO MAX U-300 SOLOSTAR) 300 unit/mL (3 mL) InPn; Inject 30 Units into the skin once daily.  Dispense: 9 mL; Refill: 11  -     dulaglutide (TRULICITY) 1.5 mg/0.5 mL PnIj; Inject 1.5 mg into the skin every 7 days.  Dispense: 2 mL; Refill: 11  -     simvastatin (ZOCOR) 40 MG tablet; Take 1 tablet (40 mg total) by mouth every evening.  Dispense: 90 tablet; Refill: 3  -     valsartan (DIOVAN) 320 MG tablet; Take 1 tablet (320 mg total) by mouth once daily.  Dispense: 90 tablet; Refill: 3  -     gabapentin (NEURONTIN) 300 MG capsule; Take 1 capsule (300 mg total) by mouth 2 (two) times daily.  Dispense: 180 capsule; Refill: 3  -     cholecalciferol, vitamin D3, 125 mcg (5,000 unit) Tab; Take 1 tablet (5,000 Units total) by mouth once daily.  Dispense: 90 tablet; Refill: 3  -     amLODIPine (NORVASC) 10 MG tablet; Take 1 tablet (10 mg total) by mouth once daily.  Dispense: 90 tablet; Refill: 3          Follow up in about 4 months (around 5/14/2020).

## 2020-01-15 ENCOUNTER — TELEPHONE (OUTPATIENT)
Dept: PHARMACY | Facility: CLINIC | Age: 75
End: 2020-01-15

## 2020-01-15 NOTE — TELEPHONE ENCOUNTER
No answer/VM to inform patient that [facility] received prescription for Praluent and benefits investigation is required.  [facility] will be back in touch once insurance determination is received.

## 2020-01-20 ENCOUNTER — TELEPHONE (OUTPATIENT)
Dept: RHEUMATOLOGY | Facility: CLINIC | Age: 75
End: 2020-01-20

## 2020-01-31 ENCOUNTER — TELEPHONE (OUTPATIENT)
Dept: RADIOLOGY | Facility: HOSPITAL | Age: 75
End: 2020-01-31

## 2020-02-03 ENCOUNTER — TELEPHONE (OUTPATIENT)
Dept: RADIOLOGY | Facility: HOSPITAL | Age: 75
End: 2020-02-03

## 2020-02-04 ENCOUNTER — HOSPITAL ENCOUNTER (OUTPATIENT)
Dept: RADIOLOGY | Facility: HOSPITAL | Age: 75
Discharge: HOME | End: 2020-02-04
Attending: INTERNAL MEDICINE
Payer: MEDICARE

## 2020-02-04 DIAGNOSIS — K11.8 SUBMANDIBULAR GLAND TENDERNESS: ICD-10-CM

## 2020-02-04 PROCEDURE — 76536 US EXAM OF HEAD AND NECK: CPT | Mod: TC,HCNC

## 2020-02-04 PROCEDURE — 76536 US EXAM OF HEAD AND NECK: CPT | Mod: 26,HCNC,, | Performed by: RADIOLOGY

## 2020-02-04 PROCEDURE — 76536 US SOFT TISSUE HEAD NECK THYROID: ICD-10-PCS | Mod: 26,HCNC,, | Performed by: RADIOLOGY

## 2020-02-11 NOTE — TELEPHONE ENCOUNTER
DOCUMENTATION ONLY:   Prior authorization for PRALUENT approved from 1/1/2020 to 12/31/2020.     Case ID# [id number]    Co-pay: $3.90    Patient Assistance IS NOT required.     Forward to clinical pharmacist for consult & shipment. FLC

## 2020-02-25 ENCOUNTER — TELEPHONE (OUTPATIENT)
Dept: INTERNAL MEDICINE | Facility: CLINIC | Age: 75
End: 2020-02-25

## 2020-02-26 ENCOUNTER — TELEPHONE (OUTPATIENT)
Dept: SPEECH THERAPY | Facility: HOSPITAL | Age: 75
End: 2020-02-26

## 2020-02-26 NOTE — TELEPHONE ENCOUNTER
Contacted daughter when orders received for MBSS. Daughter stated Ms. Martin has already completed the MBSS in [place] and does not need another.

## 2020-03-17 ENCOUNTER — TELEPHONE (OUTPATIENT)
Dept: PHARMACY | Facility: CLINIC | Age: 75
End: 2020-03-17

## 2020-03-17 ENCOUNTER — TELEPHONE (OUTPATIENT)
Dept: INTERNAL MEDICINE | Facility: CLINIC | Age: 75
End: 2020-03-17

## 2020-03-17 NOTE — TELEPHONE ENCOUNTER
----- Message from Padmini Govea MA sent at 3/17/2020  1:45 PM CDT -----      ----- Message -----  From: Shaji Valle PharmD  Sent: 3/17/2020   1:43 PM CDT  To: Antonia Martin PharmD, [facility] Staff    [facility]P is reaching out to patient to schedule first Praluent shipment.  Rx order is for 150mg every 4 weeks.   recommends dosing at 150mg every 14 days or 300mg every 28 days.  Just wanted to confirm dosing so we can arrange for shipment once patient returns our call.  Thanks!

## 2020-03-23 ENCOUNTER — PATIENT MESSAGE (OUTPATIENT)
Dept: INTERNAL MEDICINE | Facility: CLINIC | Age: 75
End: 2020-03-23

## 2020-03-24 NOTE — TELEPHONE ENCOUNTER
Daughter, Imani, reached in regards to Praluent. She confirms that patient has been on medication in the past, last dose about a month ago, due to insurance changes and reauthorization. She also confirms that she has been on Praluent 150mg/ml SQ on the  and 15th of each month. Praluent 150mg/ml PENS will be shipped on 3/26/20 to arrive at patient's home on 3/27/20 via [employer]. $ 3.90 copay ([facility] info obtained and added to [facility] - 3 month supply). Patient will restart Praluent 150mg/ml Pens on  20 and continue with dosing on the  and  of each month. Address confirmed. Confirmed 2 patient identifiers - name and . Therapy Appropriate.  --Injection experience: daughter administers for her and declines injection training or further consultation, since she's been on it recently.    Counseled patient on administration directions:  - Inject 150mg (1 pen) into the skin every 14 days.   - Take out of the refrigerator 30-60 minutes prior to injection.  - Patient will use sharps container; once full, per [place] law, she/ he may lock the sharps container and place in her trash. She/ he can then contact the Pharmacy and we will replace the sharps at no additional charge.      DDI: Medication list reviewed. No DDIs. Patient MUST contact myself or provider prior to starting any new OTC, herbal, or prescription drugs to avoid potential DDIs.    Allergies: ACEi    Storage: advised to keep refrigerated (stressed since receiving 84d/s) for stability until expiration on the box, but room temperature is ok if used within 30 days. Pt advised to keep in the fridge in an area that will not freeze or get too warm.    Patient did not have any further questions.All questions answered and addressed to patients satisfaction.[facility] to contact patient in ~2.5 months for refills. Patient understands to call [facility] if questions arise. ELVIRA

## 2020-04-02 ENCOUNTER — PATIENT MESSAGE (OUTPATIENT)
Dept: INTERNAL MEDICINE | Facility: CLINIC | Age: 75
End: 2020-04-02

## 2020-04-02 RX ORDER — FLUTICASONE PROPIONATE 50 MCG
1 SPRAY, SUSPENSION (ML) NASAL DAILY
Qty: 16 G | Refills: 11 | Status: SHIPPED | OUTPATIENT
Start: 2020-04-02 | End: 2021-08-04 | Stop reason: SDUPTHER

## 2020-04-02 RX ORDER — LEVOCETIRIZINE DIHYDROCHLORIDE 5 MG/1
5 TABLET, FILM COATED ORAL NIGHTLY
Qty: 30 TABLET | Refills: 11 | Status: SHIPPED | OUTPATIENT
Start: 2020-04-02 | End: 2021-08-04 | Stop reason: SDUPTHER

## 2020-04-16 ENCOUNTER — PATIENT MESSAGE (OUTPATIENT)
Dept: INTERNAL MEDICINE | Facility: CLINIC | Age: 75
End: 2020-04-16

## 2020-05-02 ENCOUNTER — PATIENT MESSAGE (OUTPATIENT)
Dept: ADMINISTRATIVE | Facility: OTHER | Age: 75
End: 2020-05-02

## 2020-05-04 ENCOUNTER — LAB VISIT (OUTPATIENT)
Dept: LAB | Facility: HOSPITAL | Age: 75
End: 2020-05-04
Attending: INTERNAL MEDICINE
Payer: MEDICARE

## 2020-05-04 DIAGNOSIS — E11.69 HYPERLIPIDEMIA ASSOCIATED WITH TYPE 2 DIABETES MELLITUS: ICD-10-CM

## 2020-05-04 DIAGNOSIS — E78.5 HYPERLIPIDEMIA ASSOCIATED WITH TYPE 2 DIABETES MELLITUS: ICD-10-CM

## 2020-05-04 LAB
CHOLEST SERPL-MCNC: 148 MG/DL (ref 120–199)
CHOLEST/HDLC SERPL: 3.6 {RATIO} (ref 2–5)
ESTIMATED AVG GLUCOSE: 134 MG/DL (ref 68–131)
HBA1C MFR BLD HPLC: 6.3 % (ref 4–5.6)
HDLC SERPL-MCNC: 41 MG/DL (ref 40–75)
HDLC SERPL: 27.7 % (ref 20–50)
LDLC SERPL CALC-MCNC: 77.6 MG/DL (ref 63–159)
NONHDLC SERPL-MCNC: 107 MG/DL
TRIGL SERPL-MCNC: 147 MG/DL (ref 30–150)

## 2020-05-04 PROCEDURE — 80061 LIPID PANEL: CPT | Mod: HCNC

## 2020-05-04 PROCEDURE — 83036 HEMOGLOBIN GLYCOSYLATED A1C: CPT | Mod: HCNC

## 2020-05-04 PROCEDURE — 36415 COLL VENOUS BLD VENIPUNCTURE: CPT | Mod: HCNC,PO

## 2020-05-13 ENCOUNTER — PATIENT MESSAGE (OUTPATIENT)
Dept: INTERNAL MEDICINE | Facility: CLINIC | Age: 75
End: 2020-05-13

## 2020-05-13 DIAGNOSIS — M81.0 OSTEOPOROSIS, POSTMENOPAUSAL: Primary | ICD-10-CM

## 2020-05-18 ENCOUNTER — TELEPHONE (OUTPATIENT)
Dept: RHEUMATOLOGY | Facility: CLINIC | Age: 75
End: 2020-05-18

## 2020-05-18 NOTE — TELEPHONE ENCOUNTER
----- Message from Mily Padgett PA-C sent at 5/18/2020  9:09 AM CDT -----  Regarding: appt, WQ  I can see her, from the WQ, osteoporosis, no dexa needed

## 2020-06-04 ENCOUNTER — TELEPHONE (OUTPATIENT)
Dept: RHEUMATOLOGY | Facility: CLINIC | Age: 75
End: 2020-06-04

## 2020-06-04 NOTE — TELEPHONE ENCOUNTER
Confirmed appt on 6-5-20, pt speaks limited english, but understands some, daughter will need to assist pt, advised to notify staff when arriving so she can come with pt, Verbalized understanding.

## 2020-06-09 NOTE — TELEPHONE ENCOUNTER
Called to confirm appt on 6-19-20 with Dr. Briggs, pt daughter is only able to come on Friday's. Prefers not to wait until July to see Mily Padgett on Friday's. Verbalized understanding.

## 2020-06-11 ENCOUNTER — PATIENT OUTREACH (OUTPATIENT)
Dept: ADMINISTRATIVE | Facility: HOSPITAL | Age: 75
End: 2020-06-11

## 2020-06-16 ENCOUNTER — TELEPHONE (OUTPATIENT)
Dept: PHARMACY | Facility: CLINIC | Age: 75
End: 2020-06-16

## 2020-06-18 ENCOUNTER — PATIENT OUTREACH (OUTPATIENT)
Dept: ADMINISTRATIVE | Facility: OTHER | Age: 75
End: 2020-06-18

## 2020-06-19 ENCOUNTER — LAB VISIT (OUTPATIENT)
Dept: LAB | Facility: HOSPITAL | Age: 75
End: 2020-06-19
Attending: INTERNAL MEDICINE
Payer: MEDICARE

## 2020-06-19 ENCOUNTER — TELEPHONE (OUTPATIENT)
Dept: RHEUMATOLOGY | Facility: CLINIC | Age: 75
End: 2020-06-19

## 2020-06-19 ENCOUNTER — OFFICE VISIT (OUTPATIENT)
Dept: RHEUMATOLOGY | Facility: CLINIC | Age: 75
End: 2020-06-19
Payer: MEDICARE

## 2020-06-19 VITALS
BODY MASS INDEX: 27.99 KG/M2 | SYSTOLIC BLOOD PRESSURE: 122 MMHG | DIASTOLIC BLOOD PRESSURE: 74 MMHG | WEIGHT: 152.13 LBS | HEART RATE: 85 BPM | HEIGHT: 62 IN

## 2020-06-19 DIAGNOSIS — M81.0 OSTEOPOROSIS, POSTMENOPAUSAL: ICD-10-CM

## 2020-06-19 LAB
ALBUMIN SERPL BCP-MCNC: 3.8 G/DL (ref 3.5–5.2)
ALP SERPL-CCNC: 55 U/L (ref 55–135)
ALT SERPL W/O P-5'-P-CCNC: 27 U/L (ref 10–44)
ANION GAP SERPL CALC-SCNC: 7 MMOL/L (ref 8–16)
AST SERPL-CCNC: 26 U/L (ref 10–40)
BILIRUB SERPL-MCNC: 0.3 MG/DL (ref 0.1–1)
BUN SERPL-MCNC: 12 MG/DL (ref 8–23)
CALCIUM SERPL-MCNC: 9.3 MG/DL (ref 8.7–10.5)
CHLORIDE SERPL-SCNC: 108 MMOL/L (ref 95–110)
CO2 SERPL-SCNC: 27 MMOL/L (ref 23–29)
CREAT SERPL-MCNC: 0.8 MG/DL (ref 0.5–1.4)
EST. GFR  (AFRICAN AMERICAN): >60 ML/MIN/1.73 M^2
EST. GFR  (NON AFRICAN AMERICAN): >60 ML/MIN/1.73 M^2
GLUCOSE SERPL-MCNC: 126 MG/DL (ref 70–110)
POTASSIUM SERPL-SCNC: 4.3 MMOL/L (ref 3.5–5.1)
PROT SERPL-MCNC: 7.2 G/DL (ref 6–8.4)
PTH-INTACT SERPL-MCNC: 65 PG/ML (ref 9–77)
SODIUM SERPL-SCNC: 142 MMOL/L (ref 136–145)

## 2020-06-19 PROCEDURE — 3078F PR MOST RECENT DIASTOLIC BLOOD PRESSURE < 80 MM HG: ICD-10-PCS | Mod: HCNC,CPTII,S$GLB, | Performed by: INTERNAL MEDICINE

## 2020-06-19 PROCEDURE — 1101F PR PT FALLS ASSESS DOC 0-1 FALLS W/OUT INJ PAST YR: ICD-10-PCS | Mod: HCNC,CPTII,S$GLB, | Performed by: INTERNAL MEDICINE

## 2020-06-19 PROCEDURE — 99499 UNLISTED E&M SERVICE: CPT | Mod: HCNC,S$GLB,, | Performed by: INTERNAL MEDICINE

## 2020-06-19 PROCEDURE — 3078F DIAST BP <80 MM HG: CPT | Mod: HCNC,CPTII,S$GLB, | Performed by: INTERNAL MEDICINE

## 2020-06-19 PROCEDURE — 3074F SYST BP LT 130 MM HG: CPT | Mod: HCNC,CPTII,S$GLB, | Performed by: INTERNAL MEDICINE

## 2020-06-19 PROCEDURE — 1125F AMNT PAIN NOTED PAIN PRSNT: CPT | Mod: HCNC,S$GLB,, | Performed by: INTERNAL MEDICINE

## 2020-06-19 PROCEDURE — 1125F PR PAIN SEVERITY QUANTIFIED, PAIN PRESENT: ICD-10-PCS | Mod: HCNC,S$GLB,, | Performed by: INTERNAL MEDICINE

## 2020-06-19 PROCEDURE — 99999 PR PBB SHADOW E&M-EST. PATIENT-LVL V: ICD-10-PCS | Mod: PBBFAC,HCNC,, | Performed by: INTERNAL MEDICINE

## 2020-06-19 PROCEDURE — 1159F MED LIST DOCD IN RCRD: CPT | Mod: HCNC,S$GLB,, | Performed by: INTERNAL MEDICINE

## 2020-06-19 PROCEDURE — 83970 ASSAY OF PARATHORMONE: CPT | Mod: HCNC

## 2020-06-19 PROCEDURE — 99499 RISK ADDL DX/OHS AUDIT: ICD-10-PCS | Mod: HCNC,S$GLB,, | Performed by: INTERNAL MEDICINE

## 2020-06-19 PROCEDURE — 99204 OFFICE O/P NEW MOD 45 MIN: CPT | Mod: HCNC,S$GLB,, | Performed by: INTERNAL MEDICINE

## 2020-06-19 PROCEDURE — 99999 PR PBB SHADOW E&M-EST. PATIENT-LVL V: CPT | Mod: PBBFAC,HCNC,, | Performed by: INTERNAL MEDICINE

## 2020-06-19 PROCEDURE — 1159F PR MEDICATION LIST DOCUMENTED IN MEDICAL RECORD: ICD-10-PCS | Mod: HCNC,S$GLB,, | Performed by: INTERNAL MEDICINE

## 2020-06-19 PROCEDURE — 36415 COLL VENOUS BLD VENIPUNCTURE: CPT | Mod: HCNC

## 2020-06-19 PROCEDURE — 1101F PT FALLS ASSESS-DOCD LE1/YR: CPT | Mod: HCNC,CPTII,S$GLB, | Performed by: INTERNAL MEDICINE

## 2020-06-19 PROCEDURE — 80053 COMPREHEN METABOLIC PANEL: CPT | Mod: HCNC

## 2020-06-19 PROCEDURE — 99204 PR OFFICE/OUTPT VISIT, NEW, LEVL IV, 45-59 MIN: ICD-10-PCS | Mod: HCNC,S$GLB,, | Performed by: INTERNAL MEDICINE

## 2020-06-19 PROCEDURE — 3074F PR MOST RECENT SYSTOLIC BLOOD PRESSURE < 130 MM HG: ICD-10-PCS | Mod: HCNC,CPTII,S$GLB, | Performed by: INTERNAL MEDICINE

## 2020-06-19 NOTE — TELEPHONE ENCOUNTER
----- Message from Shannon Walton sent at 6/19/2020  1:40 PM CDT -----  Regarding: appt  Pts daughter stated she will be coming in with pt for appt  [phone] (home)

## 2020-06-19 NOTE — PATIENT INSTRUCTIONS
Living with Osteoporosis: Preventing Fractures  If you have osteoporosis, you can do a lot to reduce its effect on your life. Knowing how to prevent fractures and spinal curvature can help you live more comfortably and safely with this disease.    Reducing your risk for fractures  The most common fracture sites in people with osteoporosis are the wrist, spine, and hip. These fractures are often caused by accidents and falls. All fractures are painful and may limit what you can do. But hip fractures are very serious. They need surgery, and it can take months to recover. To reduce your risk for fractures:  · Get regular exercise. Try walking, swimming, or weight training.  · Eat foods that are rich in calcium, or take calcium supplements.  · Make your home safe to help avoid accidents.  · Take extra precautions not to fall in risky areas, such as icy sidewalks.  Understanding spinal fractures  Your spine is made up of many bones called vertebrae. Osteoporosis can cause the vertebrae in your spine to collapse. As a result, your upper back may arch forward, creating a curvature. Spine fractures may also result from back strain and bad posture. You will also lose height. Your lower spine must then adjust to keep your body balanced. This can cause back pain. To prevent or lessen these spinal changes:  · Practice good posture.  · Use proper techniques if you need to lift heavy objects.  · Do back exercises to help your posture.  · Lie on your back when you have pain.  · Ask your healthcare provider about these and other ways to help your spine.  Date Last Reviewed: 10/17/2015  © 7561-7152 [employer]. [address] [place] [address], [place] [address]. All rights reserved. This information is not intended as a substitute for professional medical care. Always follow your healthcare professional's instructions.        Living with Osteoporosis: Regular Exercise  If you have osteoporosis, exercise is vital for your  health. It can prevent bone fractures and spine changes. It will slow bone loss. Exercise will strengthen your body. It can also be fun. A variety of exercises is best. See below for exercises that can help you. Before you start, though, talk to your healthcare provider to be sure these exercises are right for you.    Resistance exercises. These build muscle strength and maintain bone mass. They also make you less prone to injury. Exercises include lifting small weights, doing push-ups and sit-ups, using elastic exercise bands, and using weight machines.  Weight-bearing activities. These help your whole body. They also help you maintain bone mass. Activities include walking, dancing, and housework.  Nonweight-bearing exercises. These help prevent back strain and pain. They do this by building the trunk and leg muscles. Exercises that help with flexibility can prevent falls. Examples include swimming, water exercise, and stretching.  Staying safe  Here are tips to stay safe:   · Always check with your healthcare provider before starting any new exercise program.  · Use weights only as instructed.  · Stop any exercise that causes pain.   Date Last Reviewed: 10/17/2015  © 1842-0122 [employer]. [address] [place] [address]. All rights reserved. This information is not intended as a substitute for professional medical care. Always follow your healthcare professional's instructions.        Preventing Osteoporosis: Meeting Your Calcium Needs    Your body needs calcium to build and repair bones. But it can't make calcium on its own. That's why it's important to eat calcium-rich foods. Some foods are naturally rich in calcium. Others have calcium added (fortified). It's best to get calcium from the foods you eat. But if you can't get enough, you may want to take calcium supplements. To meet your daily calcium needs, try the foods listed below.  Dairy Fish & beans Other sources      Source    Calcium (mg) per serving   Source   Calcium (mg) per serving   Source   Calcium (mg) per serving      Low-fat yogurt, plain   415 mg/8 oz.   Sardines, Atlantic, canned, with bones   351 mg/3 oz.   Oatmeal, instant, fortified   215 mg/1 cup   Nonfat milk   302 mg/1 cup   [place], sockeye, canned, with bones   239 mg/3 oz.   Tofu made with calcium sulfate   204 mg/3 oz.   Low-fat milk   297 mg/1 cup   Soybeans, fresh, boiled   131 mg/1/2 cup   Collards   179 mg/1/2 cup   Swiss cheese   272 mg/1 oz.   White beans, cooked   81 mg/1/2 cup   English muffin, whole wheat   175 mg/1 muffin   Cheddar cheese   205 mg/1 oz.   Navy beans, cooked   79 mg/1/2 cup   Kale   90 mg/1/2 cup   Ice cream strawberry   79 mg/1/2 cup           Orange, navel   56 mg/1 medium   Note: Calcium levels may vary depending on brand and size.  Daily calcium needs  14-18 years old: 1,300 mg  19-30 years old: 1,000 mg  31-50 years old: 1,000 mg  51-70 years old, women: 1,200 mg  51-70 years old, men: 1,000 mg  Pregnant or nursin-28 years old: 1,300 mg, 19-50 years old: 1,000 mg  Older than 70 (women and men): 1,200 mg   Date Last Reviewed: 10/17/2015  © 7399-8692 [employer]. [address] [place] [address], [place] [address]. All rights reserved. This information is not intended as a substitute for professional medical care. Always follow your healthcare professional's instructions.        Preventing Osteoporosis: Avoiding Bone Loss  Certain factors can speed up bone loss or decrease bone growth. For example, alcohol, cigarettes, and certain medicines reduce bone mass. Some foods make it hard for your body to absorb calcium.    Things to avoid  Here are things to avoid to help prevent osteoporosis:  · Alcohol is toxic to bones. It is a major cause of bone loss. Heavy drinking can cause osteoporosis even if you have no other risk factors.  · Smoking reduces bone mass. Smoking may also interfere with estrogen levels and cause early  menopause.  · Inactivity makes your bones lose strength and become thinner. Over time, thin bones may break. Women who aren't active are at a high risk for osteoporosis.  · Certain medicines, such as cortisone, increase bone loss. They also decrease bone growth. Ask your healthcare provider about any side effects of your medicines, and how to prevent them.  · Protein-rich or salty foods eaten in large amounts may deplete calcium.  · Caffeine increases calcium loss. People who drink a lot of coffee, tea, or alessandra lose more calcium than those who don't.  Date Last Reviewed: 10/17/2015  © 2866-4203 [employer]. [address] [place] [address], [place] [address]. All rights reserved. This information is not intended as a substitute for professional medical care. Always follow your healthcare professional's instructions.        What Is Osteoporosis?  Osteoporosis is a disease that weakens the bones. Weakened bones are more likely to fracture (break). Osteoporosis affects men and women, but postmenopausal women are most at risk. To help prevent osteoporosis, you need to exercise and nourish your bones throughout your life.    Childhood  The body builds the most bone during these years. That's why boys and girls need foods rich in calcium. They also need plenty of exercise. A healthy diet and exercise helps bones grow strong.  Young adulthood to age 30  During young adulthood, bones become their strongest. This is called peak bone mass. The same good habits that kept bones healthy in childhood help keep bone healthy in adulthood.  Age 30 to menopause  Bone mass declines slightly during these years. Your body makes just enough new bone to maintain peak bone mass. To keep your bones at their peak mass, be sure to exercise and get plenty of calcium.  After menopause  Menopause is when a woman stops having monthly periods. After menopause, the body makes less estrogen (female hormone). This increases bone loss. At this  point, treatment may be needed to reduce the risk for fracture. Exercise and calcium can also help keep your bones strong.  Later in life  In later years, both men and women need to take extra care of their bones. By this point, the body loses more bone than it makes. If too much bone is lost, you may be at risk for fractures. With age, the quality and quantity of bone declines. You can lessen bone loss by staying active and increasing your calcium intake. Calcium supplements and other osteoporosis treatments do have risks, so talk to your healthcare provider if you have concerns. If you have osteoporosis, you can also learn ways to increase everyday safety.  Date Last Reviewed: 10/17/2015  ©2007 [employer]. [address] [place] [id number]. All Rights reserved. This information is not intended as a substitute for professional medical care. Always follow your healthcare providers instructions.        Preventing Osteoporosis: Staying Active  Certain factors can speed up bone loss or decrease bone growth. For example, a lack of activity makes bones lose their strength. Exercise plays a big part in maintaining bone mass, no matter what your age. The amount and type of activity you do also play a part in keeping your bones strong. Weight-bearing and resistance exercises, such as walking, aerobic dancing, and bicycling, are just a few of the activities that are good for your bones.     Resistance exercises, such as weight training, help maintain bones by strengthening the muscles around them. Swimming is also a good choice.     · Check with your healthcare provider before starting any new exercise program.  · Stop any exercise that causes pain.   Date Last Reviewed: 10/17/2015  © 5974-9659 [employer]. [address] [place] [address]. All rights reserved. This information is not intended as a substitute for professional medical care. Always follow your healthcare  professional's instructions.        Denosumab injection  What is this medicine?  DENOSUMAB (den oh lopez mab) slows bone breakdown. Prolia is used to treat osteoporosis in women after menopause and in men. Xgeva is used to prevent bone fractures and other bone problems caused by cancer bone metastases. Xgeva is also used to treat giant cell tumor of the bone.  How should I use this medicine?  This medicine is for injection under the skin. It is given by a health care professional in a hospital or clinic setting.  If you are getting Prolia, a special MedGuide will be given to you by the pharmacist with each prescription and refill. Be sure to read this information carefully each time.  For Prolia, talk to your pediatrician regarding the use of this medicine in children. Special care may be needed. For Xgeva, talk to your pediatrician regarding the use of this medicine in children. While this drug may be prescribed for children as young as 13 years for selected conditions, precautions do apply.  What side effects may I notice from receiving this medicine?  Side effects that you should report to your doctor or health care professional as soon as possible:  · allergic reactions like skin rash, itching or hives, swelling of the face, lips, or tongue  · breathing problems  · chest pain  · fast, irregular heartbeat  · feeling faint or lightheaded, falls  · fever, chills, or any other sign of infection  · muscle spasms, tightening, or twitches  · numbness or tingling  · skin blisters or bumps, or is dry, peels, or red  · slow healing or unexplained pain in the mouth or jaw  · unusual bleeding or bruising  Side effects that usually do not require medical attention (Report these to your doctor or health care professional if they continue or are bothersome.):  · muscle pain  · stomach upset, gas  What may interact with this medicine?  Do not take this medicine with any of the following medications:  · other medicines containing  denosumab  This medicine may also interact with the following medications:  · medicines that suppress the immune system  · medicines that treat cancer  · steroid medicines like prednisone or cortisone  What if I miss a dose?  It is important not to miss your dose. Call your doctor or health care professional if you are unable to keep an appointment.  Where should I keep my medicine?  This medicine is only given in a clinic, doctor's office, or other health care setting and will not be stored at home.  What should I tell my health care provider before I take this medicine?  They need to know if you have any of these conditions:  · dental disease  · eczema  · infection or history of infections  · kidney disease or on dialysis  · low blood calcium or vitamin D  · malabsorption syndrome  · scheduled to have surgery or tooth extraction  · taking medicine that contains denosumab  · thyroid or parathyroid disease  · an unusual reaction to denosumab, other medicines, foods, dyes, or preservatives  · pregnant or trying to get pregnant  · breast-feeding  What should I watch for while using this medicine?  Visit your doctor or health care professional for regular checks on your progress. Your doctor or health care professional may order blood tests and other tests to see how you are doing.  Call your doctor or health care professional if you get a cold or other infection while receiving this medicine. Do not treat yourself. This medicine may decrease your body's ability to fight infection.  You should make sure you get enough calcium and vitamin D while you are taking this medicine, unless your doctor tells you not to. Discuss the foods you eat and the vitamins you take with your health care professional.  See your dentist regularly. Brush and floss your teeth as directed. Before you have any dental work done, tell your dentist you are receiving this medicine.  Do not become pregnant while taking this medicine or for 5 months  after stopping it. Women should inform their doctor if they wish to become pregnant or think they might be pregnant. There is a potential for serious side effects to an unborn child. Talk to your health care professional or pharmacist for more information.  NOTE:This sheet is a summary. It may not cover all possible information. If you have questions about this medicine, talk to your doctor, pharmacist, or health care provider. Copyright© 2017 [employer]        Back Care Tips    Caring for your back  These are things you can do to prevent a recurrence of acute back pain and to reduce symptoms from chronic back pain:  · Maintain a healthy weight. If you are overweight, losing weight will help most types of back pain.  · Exercise is an important part of recovery from most types of back pain. The muscles behind and in front of the spine support the back. This means strengthening both the back muscles and the abdominal muscles will provide better support for your spine.   · Swimming and brisk walking are good overall exercises to improve your fitness level.  · Practice safe lifting methods (below).  · Practice good posture when sitting, standing and walking. Avoid prolonged sitting. This puts more stress on the lower back than standing or walking.  · Wear quality shoes with sufficient arch support. Foot and ankle alignment can affect back symptoms. Women should avoid wearing high heels.  · Therapeutic massage can help relax the back muscles without stretching them.  · During the first 24 to 72 hours after an acute injury or flare-up of chronic back pain, apply an ice pack to the painful area for 20 minutes and then remove it for 20 minutes, over a period of 60 to 90 minutes, or several times a day. As a safety precaution, do not use a heating pad at bedtime. Sleeping on a heating pad can lead to skin burns or tissue damage.  · You can alternate ice and heat therapies.  Medications  Talk to your healthcare provider  before using medicines, especially if you have other medical problems or are taking other medicines.  · You may use acetaminophen or ibuprofen to control pain, unless your healthcare provider prescribed other pain medicine. If you have chronic conditions like diabetes, liver or kidney disease, stomach ulcers, or gastrointestinal bleeding, or are taking blood thinners, talk with your healthcare provider before taking any medicines.  · Be careful if you are given prescription pain medicines, narcotics, or medicine for muscle spasm. They can cause drowsiness, affect your coordination, reflexes, and judgment. Do not drive or operate heavy machinery while taking these types of medicines. Take prescription pain medicine only as prescribed by your healthcare provider.  Lumbar stretch  Here is a simple stretching exercise that will help relax muscle spasm and keep your back more limber. If exercise makes your back pain worse, dont do it.  · Lie on your back with your knees bent and both feet on the ground.  · Slowly raise your left knee to your chest as you flatten your lower back against the floor. Hold for 5 seconds.  · Relax and repeat the exercise with your right knee.  · Do 10 of these exercises for each leg.  Safe lifting method  · Dont bend over at the waist to lift an object off the floor.  Instead, bend your knees and hips in a squat.   · Keep your back and head upright  · Hold the object close to your body, directly in front of you.  · Straighten your legs to lift the object.   · Lower the object to the floor in the reverse fashion.  · If you must slide something across the floor, push it.  Posture tips  Sitting  Sit in chairs with straight backs or low-back support. Keep your knees lower than your hips, with your feet flat on the floor.  When driving, sit up straight. Adjust the seat forward so you are not leaning toward the steering wheel.  A small pillow or rolled towel behind your lower back may help if you  are driving long distances.   Standing  When standing for long periods, shift most of your weight to one leg at a time. Alternate legs every few minutes.   Sleeping  The best way to sleep is on your side with your knees bent. Put a low pillow under your head to support your neck in a neutral spine position. Avoid thick pillows that bend your neck to one side. Put a pillow between your legs to further relax your lower back. If you sleep on your back, put pillows under your knees to support your legs in a slightly flexed position. Use a firm mattress. If your mattress sags, replace it, or use a 1/2-inch plywood board under the mattress to add support.  Follow-up care  Follow up with your healthcare provider, or as advised.  If X-rays, a CT scan or an MRI scan were taken, they will be reviewed by a radiologist. You will be notified of any new findings that may affect your care.  Call 911  Seek emergency medical care if any of the following occur:  · Trouble breathing  · Confusion  · Very drowsy  · Fainting or loss of consciousness  · Rapid or very slow heart rate  · Loss of  bowel or bladder control  When to seek medical care  Call your healthcare provider if any of the following occur:  · Pain becomes worse or spreads to your arms or legs  · Weakness or numbness in one or both arms or legs  · Numbness in the groin area  Date Last Reviewed: 6/1/2016  © 0754-3793 [employer]. [address] [place], [place] [id number]. All rights reserved. This information is not intended as a substitute for professional medical care. Always follow your healthcare professional's instructions.        Self-Care for Low Back Pain    Most people have low back pain now and then. In many cases, it isnt serious and self-care can help. Sometimes low back pain can be a sign of a bigger problem. Call your healthcare provider if your pain returns often or gets worse over time. For the long-term care of your back, get regular  exercise, lose any excess weight and learn good posture.  Take a short rest  Lying down during the day may be beneficial for short periods of time if severe pain increases with sitting or standing. Long-term bed rest could be detrimental.  Reduce pain and swelling  Cold reduces swelling. Both cold and heat can reduce pain. Protect your skin by placing a towel between your body and the ice or heat source.  · For the first few days, apply an ice pack for 15 to 20 minutes .  · After the first few days, try heat for 15 minutes at a time to ease pain. Never sleep on a heating pad.  · Over-the-counter medicine can help control pain and swelling. Try aspirin or ibuprofen.  Exercise  Exercise can help your back heal. It also helps your back get stronger and more flexible, preventing any reinjury. Ask your healthcare provider about specific exercises for your back.  Use good posture to avoid reinjury  · When moving, bend at the hips and knees. Dont bend at the waist or twist around.  · When lifting, keep the object close to your body. Dont try to lift more than you can handle.  · When sitting, keep your lower back supported. Use a rolled-up towel as needed.  Seek immediate medical care if:  · Youre unable to stand or walk.  · You have a temperature over 100.4°F (38.0°C)  · You have frequent, painful, or bloody urination.  · You have severe abdominal pain.  · You have a sharp, stabbing pain.  · Your pain is constant.  · You have pain or numbness in your leg.  · You feel pain in a new area of your back.  · You notice that the pain isnt decreasing after more than a week.   Date Last Reviewed: 9/29/2015  © 9832-2969 [employer]. [address] [place] [address], [place] [address]. All rights reserved. This information is not intended as a substitute for professional medical care. Always follow your healthcare professional's instructions.        Relieving Back Pain  Back pain is a common problem. You can strain back  muscles by lifting too much weight or just by moving the wrong way. Back strain can be uncomfortable, even painful. And it can take weeks or months to improve. To help yourself feel better and prevent future back strains, try these tips.  Important Note: Do not give aspirin to children or teens without first discussing it with your healthcare provider.      ? Ice    Ice reduces muscle pain and swelling. It helps most during the first 24 to 48 hours after an injury.  · Wrap an ice pack or a bag of frozen peas in a thin towel. (Never place ice directly on your skin.)  · Place the ice where your back hurts the most.  · Dont ice for more than 20 minutes at a time.  · You can use ice several times a day.  ? Medicines  Over-the-counter pain relievers can include acetaminophen and anti-inflammatory medicines, which includes aspirin or ibuprofen. They can help ease discomfort. Some also reduce swelling.  · Tell your healthcare provider about any medicines you are already taking.  · Take medicines only as directed.  ? Heat  After the first 48 hours, heat can relax sore muscles and improve blood flow.  · Try a warm bath or shower. Or use a heating pad set on low. To prevent a burn, keep a cloth between you and the heating pad.  · Dont use a heating pad for more than 15 minutes at a time. Never sleep on a heating pad.  Date Last Reviewed: 9/1/2015  © 9868-3551 [employer]. [address] [place] [address], [place] [address]. All rights reserved. This information is not intended as a substitute for professional medical care. Always follow your healthcare professional's instructions.

## 2020-06-19 NOTE — LETTER
June 19, 2020      Hardy Tran MD  [address] [facility]  [address]  [place] [address]           [facility]  [address] [facility] [address]  Phone: [phone]  Fax: [phone]          Patient: Brianne Martin   MR Number: [id number]   YOB: 1945   Date of Visit: 6/19/2020       Dear Dr. Hardy Tran:    Thank you for referring Brianne Martin to me for evaluation. Attached you will find relevant portions of my assessment and plan of care.    If you have questions, please do not hesitate to call me. I look forward to following Brianne Martin along with you.    Sincerely,    Joseph Briggs MD    Enclosure  CC:  No Recipients    If you would like to receive this communication electronically, please contact [email or web address] or [phone] to request more information on [employer] access.    For providers and/or their staff who would like to refer a patient to [facility], please contact us through our one-stop-shop provider referral line, [facility], at [phone].    If you feel you have received this communication in error or would no longer like to receive these types of communications, please e-mail [email or web address]

## 2020-06-19 NOTE — PROGRESS NOTES
RHEUMATOLOGY OUTPATIENT CLINIC NOTE    6/19/2020    Attending Rheumatologist: Joseph Briggs  Primary Care Provider: Hardy Tran MD   Physician Requesting Consultation: Hardy Tran MD  [address] [facility]  [address]  [place] [address]  Chief Complaint/Reason For Consultation:  Osteoporosis    Subjective:       HPI  Brianne Martin is a 75 y.o.  female with osteoporosis referred for rheumatic evaluation.  Chronic diagnosis, reported to be on oral bisphosphonates.  Switched therapy to Prolia, receive once 11/2019.  Patient relocated, recommended to establish care for bone antiresorptive therapy.    Today:  No acute complaints.  Main complaint is chronic lower back pain.  Worst in the evening, aggravated by range of motion/weight bearing, relieved somewhat by rest and OTC pain medication.  Denies association with prolonged morning stiffness, fever, or paresthesias/other focal neurological symptoms.  Denies prior episodes of fragility fractures or recent falls.  Currently not planned for invasive dental procedures.    Review of Systems   Constitutional: Negative for chills, fever and malaise/fatigue.   Eyes: Negative for pain and redness.   Respiratory: Negative for cough, hemoptysis and shortness of breath.    Cardiovascular: Negative for chest pain and leg swelling.   Gastrointestinal: Negative for abdominal pain, blood in stool and melena.   Genitourinary: Negative for dysuria and hematuria.   Musculoskeletal: Positive for back pain (Chronic, mechanical features.  No alarm signs or symptoms.). Negative for falls and joint pain.   Skin: Negative for rash.   Neurological: Negative for tingling and focal weakness.   Psychiatric/Behavioral: Negative for memory loss. The patient does not have insomnia.      Chronic comorbid conditions affecting medical decision making today:  Past Medical History:   Diagnosis Date    Arthritis 12/10/2014    Elevated LFTs 2012    negative workup     Hyperlipidemia LDL goal <100     Hypertension     Mixed anxiety and depressive disorder     Osteoporosis, postmenopausal     Overweight(278.02)     Reflux     Traumatic closed displaced bimalleolar fracture of right ankle 12/25/2013    Type II or unspecified type diabetes mellitus without mention of complication, uncontrolled     Vitamin D deficiency disease      Past Surgical History:   Procedure Laterality Date    APPENDECTOMY      CATARACT EXTRACTION      ou    ORIF right ankle  12/2013    TUBAL LIGATION       Family History   Problem Relation Age of Onset    Hypertension Unknown     Diabetes Unknown     Cataracts Father     No Known Problems Mother     No Known Problems Sister     No Known Problems Brother     No Known Problems Maternal Aunt     No Known Problems Maternal Uncle     No Known Problems Paternal Aunt     No Known Problems Paternal Uncle     No Known Problems Maternal Grandmother     No Known Problems Maternal Grandfather     No Known Problems Paternal Grandmother     No Known Problems Paternal Grandfather     Amblyopia Neg Hx     Blindness Neg Hx     Cancer Neg Hx     Glaucoma Neg Hx     Macular degeneration Neg Hx     Retinal detachment Neg Hx     Strabismus Neg Hx     Stroke Neg Hx     Thyroid disease Neg Hx      Social History     Substance and Sexual Activity   Alcohol Use Yes    Frequency: Monthly or less    Drinks per session: 1 or 2    Binge frequency: Never    Comment: socially     Social History     Tobacco Use   Smoking Status Never Smoker   Smokeless Tobacco Never Used     Social History     Substance and Sexual Activity   Drug Use Never       Current Outpatient Medications:     alirocumab (PRALUENT PEN) 150 mg/mL PnIj, Inject 1 mL (150 mg total) into the skin every 14 (fourteen) days., Disp: 6 mL, Rfl: 3    amLODIPine (NORVASC) 10 MG tablet, Take 1 tablet (10 mg total) by mouth once daily., Disp: 90 tablet, Rfl: 3    aspirin (ECOTRIN) 81 MG EC  "tablet, , Disp: , Rfl:     blood sugar diagnostic Strp, Contour test strips. Test bid-tid., Disp: 100 strip, Rfl: 11    cholecalciferol, vitamin D3, 125 mcg (5,000 unit) Tab, Take 1 tablet (5,000 Units total) by mouth once daily., Disp: 90 tablet, Rfl: 3    denosumab (PROLIA) 60 mg/mL Syrg, Inject 1 mL (60 mg total) into the skin every 6 (six) months., Disp: 2 mL, Rfl: 0    fluticasone propionate (FLONASE) 50 mcg/actuation nasal spray, 1 spray (50 mcg total) by Each Nostril route once daily., Disp: 16 g, Rfl: 11    glucagon (human recombinant) inj 1mg/mL kit, Inject 1 mg into the muscle as needed., Disp: , Rfl:     insulin aspart U-100 (NOVOLOG FLEXPEN U-100 INSULIN) 100 unit/mL (3 mL) InPn pen, Inject 12-22 Units into the skin 3 (three) times daily with meals. 1 units with each meal, Disp: 12 mL, Rfl: 11    insulin glargine U-300 conc (TOUJEO MAX U-300 SOLOSTAR) 300 unit/mL (3 mL) InPn, Inject 30 Units into the skin once daily., Disp: 9 mL, Rfl: 11    insulin glulisine U-100 (APIDRA) 100 unit/mL injection, Inject into the skin 3 (three) times daily before meals., Disp: , Rfl:     insulin needles, disposable, (BD INSULIN PEN NEEDLE UF MINI) 31 x 3/16 " Ndle, Inject 1 Units into the skin every evening., Disp: 100 each, Rfl: 3    levocetirizine (XYZAL) 5 MG tablet, Take 1 tablet (5 mg total) by mouth every evening., Disp: 30 tablet, Rfl: 11    meloxicam (MOBIC) 15 MG tablet, TAKE 1 TABLET EVERY DAY, Disp: 90 tablet, Rfl: 3    metFORMIN (GLUCOPHAGE) 1000 MG tablet, Take 1 tablet (1,000 mg total) by mouth 2 (two) times daily with meals., Disp: 180 tablet, Rfl: 3    multivitamin Liqd, Take by mouth., Disp: , Rfl:     olopatadine (PAZEO) 0.7 % Drop, Place 1 drop into both eyes once daily. for 365 doses, Disp: 2.5 mL, Rfl: 6    omega-3 acid ethyl esters (LOVAZA) 1 gram capsule, Take 2 g by mouth 2 (two) times daily., Disp: , Rfl:     omeprazole (PRILOSEC) 40 MG capsule, Take 1 capsule (40 mg total) by mouth " "once daily., Disp: 30 capsule, Rfl: 11    simvastatin (ZOCOR) 40 MG tablet, Take 1 tablet (40 mg total) by mouth every evening., Disp: 90 tablet, Rfl: 3    valsartan (DIOVAN) 320 MG tablet, Take 1 tablet (320 mg total) by mouth once daily., Disp: 90 tablet, Rfl: 3    venlafaxine (EFFEXOR) 75 MG tablet, Take 75 mg by mouth 2 (two) times daily., Disp: , Rfl:     gabapentin (NEURONTIN) 300 MG capsule, Take 1 capsule (300 mg total) by mouth 2 (two) times daily., Disp: 180 capsule, Rfl: 3     Objective:         Vitals:    06/19/20 1418   BP: 122/74   Pulse: 85     Physical Exam   Constitutional: She is oriented to person, place, and time. No distress.   Estimated body mass index is 27.82 kg/m² as calculated from the following:    Height as of this encounter: 5' 2" (1.575 m).    Weight as of this encounter: 69 kg (152 lb 1.9 oz).    Wt Readings from Last 1 Encounters:  06/19/20 1418 : 69 kg (152 lb 1.9 oz)     HENT:   Head: Normocephalic and atraumatic.   Eyes: Conjunctivae are normal. Pupils are equal, round, and reactive to light.   Neck: Normal range of motion.   Cardiovascular: Normal rate and intact distal pulses.    Pulmonary/Chest: Effort normal. No respiratory distress.   Abdominal: Soft. She exhibits no distension.   Neurological: She is alert and oriented to person, place, and time.   Skin: No rash noted. No erythema.     Musculoskeletal: Normal range of motion. No tenderness.      Comments: : strong  No synovitis or significant squeeze tenderness    AROM: intact  PROM: intact    Devices used by patient: none       Reviewed old and all outside pertinent medical records available.    All lab results personally reviewed and interpreted by me.  Lab Results   Component Value Date    WBC 6.33 11/29/2019    HGB 11.9 (L) 11/29/2019    HCT 39.8 11/29/2019    MCV 93 11/29/2019    MCH 27.7 11/29/2019    MCHC 29.9 (L) 11/29/2019    RDW 14.2 11/29/2019     11/29/2019    MPV 11.6 11/29/2019       Lab Results "   Component Value Date     11/29/2019    K 4.2 11/29/2019     11/29/2019    CO2 29 11/29/2019    GLU 82 11/29/2019    BUN 18 11/29/2019    CALCIUM 9.7 11/29/2019    PROT 7.2 11/29/2019    ALBUMIN 3.9 11/29/2019    BILITOT 0.4 11/29/2019    AST 34 11/29/2019    ALKPHOS 56 11/29/2019    ALT 34 11/29/2019       Lab Results   Component Value Date    COLORU red 07/30/2014    APPEARANCEUA Clear 12/26/2013    SPECGRAV 1.000 07/30/2014    PHUR 7 07/30/2014    PROTEINUA Negative 12/26/2013    KETONESU + 07/30/2014    LEUKOCYTESUR Negative 12/26/2013    NITRITE pos 07/30/2014    UROBILINOGEN 1 07/30/2014       No results found for: CRP    No results found for: SEDRATE, ERYTHROCYTES    No results found for: XIMENA, RF, SEDRATE    No components found for: 25OHVITDTOT, 55LUGLKT2, 55EYIMOR6, METHODNOTE    No results found for: URICACID    No components found for: TSPOTTB     Vitamin-D 71 March 2015    Imaging:  All imaging reviewed and independently  interpreted by me.    X-ray tibia December 2013  oblique fracture of the lateral malleolus extending from the level of the plafond.  There is a transverse fracture of the medial malleolus and likely oblique fracture of the posterior plafond.  There is associated posterior   dislocation of the tibiotalar joint and displacement of fracture fragments.  Proximal tibia/fibula are unremarkable.    X-ray lumbar spine November 2013   Minimal anterior osteophytes are present in the lower lumbar spine.  There is likely mild bilateral facet arthropathy at L5-S1.   Remaining visualized osseous and soft tissue structures are within normal limits.    CTA chest December 2013  Multilevel degenerative disk disease.  No acute osseous abnormality is seen.    DEXA scan  September 2019 February 2013  FN: -1.9  -3.3  LS: -3.2  -4.1     ASSESSMENT / PLAN:     Brianne Mratin is a 75 y.o.  female with:    1. Osteoporosis, postmenopausal  - prior long bone fracture, at high risk of fragility  fracture risk on last densitometry  - documented for prior bisphosphonate therapy with suboptimal response.  - initiated Prolia November 2019, due for repeat dose.  - repeat CMP and send for PTH.  Will resume Prolia if no contraindications.  - Discussed and verbalized understanding concerning the adverse effects of therapy, particularly risk of osteonecrosis and atypical fractures  - adequate dietary calcium and vitamin D intake.  - Avoid immobility, fall prevention   - Ambulatory referral/consult to Rheumatology  - Comprehensive metabolic panel; Standing  - PTH, intact; Standing    2. Osteoarthritis  - history of present illness and current findings consistent with DDD.  - stretching, massage, heat  - Acetaminophen prn -> standing up to 2 g per day  - Topicals therapy: Capsaicin / NSAIDs   - following with pain management  - gabapentin with suboptimal response  - deferred physical therapy    3. Other specified counseling  - Nutrition and exercise counseling.  - Limitation of alcohol consumption.  - Regular exercise:  Aerobic and resistance.  - Medication counseling provided.    No follow-ups on file.   RTC 6 months    Method of contact with patient concerns: [facility]    Disclaimer:  This note is prepared using voice recognition software and as such is likely to have errors and has not been proof read. Please contact me for questions.     Joseph Briggs M.D.  Rheumatology Department   [facility]

## 2020-06-23 ENCOUNTER — TELEPHONE (OUTPATIENT)
Dept: RHEUMATOLOGY | Facility: CLINIC | Age: 75
End: 2020-06-23

## 2020-06-23 NOTE — TELEPHONE ENCOUNTER
----- Message from Joseph Briggs MD sent at 6/23/2020  7:25 AM CDT -----  Patient okay to receive Prolia whenever re-approved and per schedule.  Thanks!

## 2020-06-25 ENCOUNTER — PATIENT OUTREACH (OUTPATIENT)
Dept: ADMINISTRATIVE | Facility: HOSPITAL | Age: 75
End: 2020-06-25

## 2020-06-25 NOTE — PROGRESS NOTES
Contacted patient to follow up on overdue Colonoscopy. Spoke with patients daughter and she states she will discuss it with her mom and call back when she is ready to schedule

## 2020-07-08 ENCOUNTER — TELEPHONE (OUTPATIENT)
Dept: PHARMACY | Facility: CLINIC | Age: 75
End: 2020-07-08

## 2020-07-08 ENCOUNTER — PATIENT MESSAGE (OUTPATIENT)
Dept: INTERNAL MEDICINE | Facility: CLINIC | Age: 75
End: 2020-07-08

## 2020-07-08 DIAGNOSIS — R13.10 DYSPHAGIA, UNSPECIFIED TYPE: ICD-10-CM

## 2020-07-08 DIAGNOSIS — R09.A2 GLOBUS SENSATION: Primary | ICD-10-CM

## 2020-07-09 NOTE — TELEPHONE ENCOUNTER
Tried contacting patient and there was no answer or able to leave message. [facility] has not started back doing barium as of yet only benitez.

## 2020-07-10 ENCOUNTER — INFUSION (OUTPATIENT)
Dept: INFUSION THERAPY | Facility: HOSPITAL | Age: 75
End: 2020-07-10
Attending: RADIOLOGY
Payer: MEDICARE

## 2020-07-10 VITALS
HEART RATE: 78 BPM | OXYGEN SATURATION: 99 % | TEMPERATURE: 98 F | SYSTOLIC BLOOD PRESSURE: 145 MMHG | RESPIRATION RATE: 16 BRPM | DIASTOLIC BLOOD PRESSURE: 82 MMHG

## 2020-07-10 DIAGNOSIS — M81.0 OSTEOPOROSIS, POSTMENOPAUSAL: Primary | ICD-10-CM

## 2020-07-10 PROCEDURE — 63600175 PHARM REV CODE 636 W HCPCS: Mod: JG,HCNC | Performed by: INTERNAL MEDICINE

## 2020-07-10 PROCEDURE — 96372 THER/PROPH/DIAG INJ SC/IM: CPT | Mod: HCNC

## 2020-07-10 RX ADMIN — DENOSUMAB 60 MG: 60 INJECTION SUBCUTANEOUS at 03:07

## 2020-07-10 NOTE — NURSING
Lab Results   Component Value Date    CALCIUM 9.3 06/19/2020     Lab Results   Component Value Date    CREATININE 0.8 06/19/2020     Lab Results   Component Value Date    ESTGFRAFRICA >60 06/19/2020     Lab Results   Component Value Date    EGFRNONAA >60 06/19/2020     Lab Results   Component Value Date    NMKQPONG35SL 71 03/16/2015       Printed information regarding Prolia given to pt. Instructed on s/s to report. Verbalized understanding.    Labs from 6/19/20 Reviewed and OK to give Prolia Today Dr Ye.    Administered Prolia 60 mg/ml SQ in Right Arm.  Bandaid applied.    Instructed to wait in clinic x 15 min. For monitoring.  Pt tolerated well without adverse event.  Discharged [id number]  from clinic.

## 2020-07-10 NOTE — PATIENT INSTRUCTIONS
Denosumab injection  What is this medicine?  DENOSUMAB (den oh lopez mab) slows bone breakdown. Prolia is used to treat osteoporosis in women after menopause and in men. Xgeva is used to prevent bone fractures and other bone problems caused by cancer bone metastases. Xgeva is also used to treat giant cell tumor of the bone.  How should I use this medicine?  This medicine is for injection under the skin. It is given by a health care professional in a hospital or clinic setting.  If you are getting Prolia, a special MedGuide will be given to you by the pharmacist with each prescription and refill. Be sure to read this information carefully each time.  For Prolia, talk to your pediatrician regarding the use of this medicine in children. Special care may be needed. For Xgeva, talk to your pediatrician regarding the use of this medicine in children. While this drug may be prescribed for children as young as 13 years for selected conditions, precautions do apply.  What side effects may I notice from receiving this medicine?  Side effects that you should report to your doctor or health care professional as soon as possible:  · allergic reactions like skin rash, itching or hives, swelling of the face, lips, or tongue  · breathing problems  · chest pain  · fast, irregular heartbeat  · feeling faint or lightheaded, falls  · fever, chills, or any other sign of infection  · muscle spasms, tightening, or twitches  · numbness or tingling  · skin blisters or bumps, or is dry, peels, or red  · slow healing or unexplained pain in the mouth or jaw  · unusual bleeding or bruising  Side effects that usually do not require medical attention (Report these to your doctor or health care professional if they continue or are bothersome.):  · muscle pain  · stomach upset, gas  What may interact with this medicine?  Do not take this medicine with any of the following medications:  · other medicines containing denosumab  This medicine may also  interact with the following medications:  · medicines that suppress the immune system  · medicines that treat cancer  · steroid medicines like prednisone or cortisone  What if I miss a dose?  It is important not to miss your dose. Call your doctor or health care professional if you are unable to keep an appointment.  Where should I keep my medicine?  This medicine is only given in a clinic, doctor's office, or other health care setting and will not be stored at home.  What should I tell my health care provider before I take this medicine?  They need to know if you have any of these conditions:  · dental disease  · eczema  · infection or history of infections  · kidney disease or on dialysis  · low blood calcium or vitamin D  · malabsorption syndrome  · scheduled to have surgery or tooth extraction  · taking medicine that contains denosumab  · thyroid or parathyroid disease  · an unusual reaction to denosumab, other medicines, foods, dyes, or preservatives  · pregnant or trying to get pregnant  · breast-feeding  What should I watch for while using this medicine?  Visit your doctor or health care professional for regular checks on your progress. Your doctor or health care professional may order blood tests and other tests to see how you are doing.  Call your doctor or health care professional if you get a cold or other infection while receiving this medicine. Do not treat yourself. This medicine may decrease your body's ability to fight infection.  You should make sure you get enough calcium and vitamin D while you are taking this medicine, unless your doctor tells you not to. Discuss the foods you eat and the vitamins you take with your health care professional.  See your dentist regularly. Brush and floss your teeth as directed. Before you have any dental work done, tell your dentist you are receiving this medicine.  Do not become pregnant while taking this medicine or for 5 months after stopping it. Women should  inform their doctor if they wish to become pregnant or think they might be pregnant. There is a potential for serious side effects to an unborn child. Talk to your health care professional or pharmacist for more information.  NOTE:This sheet is a summary. It may not cover all possible information. If you have questions about this medicine, talk to your doctor, pharmacist, or health care provider. Copyright© 2017 [employer]

## 2020-07-22 ENCOUNTER — PATIENT OUTREACH (OUTPATIENT)
Dept: ADMINISTRATIVE | Facility: OTHER | Age: 75
End: 2020-07-22

## 2020-07-22 NOTE — PROGRESS NOTES
Requested updates within Care Everywhere.  Patient's chart was reviewed for overdue NISHANT topics.  Immunizations reconciled.

## 2020-07-24 ENCOUNTER — OFFICE VISIT (OUTPATIENT)
Dept: OTOLARYNGOLOGY | Facility: CLINIC | Age: 75
End: 2020-07-24
Payer: MEDICARE

## 2020-07-24 VITALS
DIASTOLIC BLOOD PRESSURE: 80 MMHG | WEIGHT: 149.94 LBS | BODY MASS INDEX: 27.42 KG/M2 | HEART RATE: 82 BPM | TEMPERATURE: 98 F | SYSTOLIC BLOOD PRESSURE: 153 MMHG

## 2020-07-24 DIAGNOSIS — K21.9 GASTROESOPHAGEAL REFLUX DISEASE, ESOPHAGITIS PRESENCE NOT SPECIFIED: Primary | ICD-10-CM

## 2020-07-24 DIAGNOSIS — R09.A2 GLOBUS SENSATION: ICD-10-CM

## 2020-07-24 PROCEDURE — 3077F PR MOST RECENT SYSTOLIC BLOOD PRESSURE >= 140 MM HG: ICD-10-PCS | Mod: HCNC,CPTII,S$GLB, | Performed by: OTOLARYNGOLOGY

## 2020-07-24 PROCEDURE — 99203 PR OFFICE/OUTPT VISIT, NEW, LEVL III, 30-44 MIN: ICD-10-PCS | Mod: HCNC,S$GLB,, | Performed by: OTOLARYNGOLOGY

## 2020-07-24 PROCEDURE — 1159F MED LIST DOCD IN RCRD: CPT | Mod: HCNC,S$GLB,, | Performed by: OTOLARYNGOLOGY

## 2020-07-24 PROCEDURE — 99203 OFFICE O/P NEW LOW 30 MIN: CPT | Mod: HCNC,S$GLB,, | Performed by: OTOLARYNGOLOGY

## 2020-07-24 PROCEDURE — 1159F PR MEDICATION LIST DOCUMENTED IN MEDICAL RECORD: ICD-10-PCS | Mod: HCNC,S$GLB,, | Performed by: OTOLARYNGOLOGY

## 2020-07-24 PROCEDURE — 99999 PR PBB SHADOW E&M-EST. PATIENT-LVL V: CPT | Mod: PBBFAC,HCNC,, | Performed by: OTOLARYNGOLOGY

## 2020-07-24 PROCEDURE — 3079F PR MOST RECENT DIASTOLIC BLOOD PRESSURE 80-89 MM HG: ICD-10-PCS | Mod: HCNC,CPTII,S$GLB, | Performed by: OTOLARYNGOLOGY

## 2020-07-24 PROCEDURE — 1126F AMNT PAIN NOTED NONE PRSNT: CPT | Mod: HCNC,S$GLB,, | Performed by: OTOLARYNGOLOGY

## 2020-07-24 PROCEDURE — 1101F PT FALLS ASSESS-DOCD LE1/YR: CPT | Mod: HCNC,CPTII,S$GLB, | Performed by: OTOLARYNGOLOGY

## 2020-07-24 PROCEDURE — 99499 RISK ADDL DX/OHS AUDIT: ICD-10-PCS | Mod: S$PBB,HCNC,, | Performed by: OTOLARYNGOLOGY

## 2020-07-24 PROCEDURE — 99999 PR PBB SHADOW E&M-EST. PATIENT-LVL V: ICD-10-PCS | Mod: PBBFAC,HCNC,, | Performed by: OTOLARYNGOLOGY

## 2020-07-24 PROCEDURE — 3077F SYST BP >= 140 MM HG: CPT | Mod: HCNC,CPTII,S$GLB, | Performed by: OTOLARYNGOLOGY

## 2020-07-24 PROCEDURE — 1101F PR PT FALLS ASSESS DOC 0-1 FALLS W/OUT INJ PAST YR: ICD-10-PCS | Mod: HCNC,CPTII,S$GLB, | Performed by: OTOLARYNGOLOGY

## 2020-07-24 PROCEDURE — 1126F PR PAIN SEVERITY QUANTIFIED, NO PAIN PRESENT: ICD-10-PCS | Mod: HCNC,S$GLB,, | Performed by: OTOLARYNGOLOGY

## 2020-07-24 PROCEDURE — 99499 UNLISTED E&M SERVICE: CPT | Mod: S$PBB,HCNC,, | Performed by: OTOLARYNGOLOGY

## 2020-07-24 PROCEDURE — 3079F DIAST BP 80-89 MM HG: CPT | Mod: HCNC,CPTII,S$GLB, | Performed by: OTOLARYNGOLOGY

## 2020-07-24 NOTE — LETTER
July 24, 2020      Hardy Tran MD  [address] [facility]  [address]  [place] [address]           [facility]  [address] [facility] [place] [address]  Phone: [phone]  Fax: [phone]          Patient: Brianne Martin   MR Number: [id number]   YOB: 1945   Date of Visit: 7/24/2020       Dear Dr. Hardy Tran:    Thank you for referring Brianne Martin to me for evaluation. Attached you will find relevant portions of my assessment and plan of care.    If you have questions, please do not hesitate to call me. I look forward to following Brianne Martin along with you.    Sincerely,    Jeronimo Hernandez MD    Enclosure  CC:  No Recipients    If you would like to receive this communication electronically, please contact [email or web address] or [phone] to request more information on [employer] Link access.    For providers and/or their staff who would like to refer a patient to [facility], please contact us through our one-stop-shop provider referral line, [facility], at [phone].    If you feel you have received this communication in error or would no longer like to receive these types of communications, please e-mail [email or web address]

## 2020-07-24 NOTE — PROGRESS NOTES
"Referring Provider:    Hardy Tran Md  [address]  [address]  [place]  [place] [address]  Subjective:   Patient: Brianne Martin [id number], :1945   Visit date:2020 2:14 PM    Chief Complaint:  Globus Sensation (feels like a lump in her throat, gagging on food for years ) and Sinus Problem (c/o sinus drip )    HPI:  Brianne is a 75 y.o. female who I was asked to see in consultation for evaluation of the following issue(s):     Brianne noticed "bumps" on the back of her tongue about 4 years ago.  Has been putting triple antibiotic ointment on them nightly.  She notes that there is some change in their size overnight- bigger in am and smaller in PM.  Has complained of swallowing difficulty.  She notes that hard foods like steak are more difficult.  She has no choking but feels that after she swallows, the passage down to the stomach is very slow.  She has fairly severe reflux symptoms with belching and heartburn.  She takes omeprazole and antacids.  She has never seen GI.   No smoking history.     Review of Systems:  -     Allergic/Immunologic: is allergic to ace inhibitors..  -     Constitutional: Current temp: 98.2 °F (36.8 °C) (Tympanic)      Her meds, allergies, medical, surgical, social & family histories were reviewed & updated:  -     She has a current medication list which includes the following prescription(s): alirocumab, amlodipine, aspirin, blood sugar diagnostic, cholecalciferol (vitamin d3), denosumab, fluticasone propionate, glucagon (human recombinant), insulin aspart u-100, insulin glargine u-300 conc, insulin glulisine u-100, pen needle, diabetic, levocetirizine, meloxicam, metformin, multivitamin, olopatadine, omega-3 acid ethyl esters, omeprazole, simvastatin, valsartan, venlafaxine, and gabapentin.  -     She  has a past medical history of Arthritis (12/10/2014), Elevated LFTs (), Hyperlipidemia LDL goal <100, Hypertension, Mixed anxiety and depressive disorder, Osteoporosis, " postmenopausal, Overweight(278.02), Reflux, Traumatic closed displaced bimalleolar fracture of right ankle (12/25/2013), Type II or unspecified type diabetes mellitus without mention of complication, uncontrolled, and Vitamin D deficiency disease.   -     She does not have any pertinent problems on file.   -     She  has a past surgical history that includes Appendectomy; Tubal ligation; Cataract extraction; and ORIF right ankle (12/2013).  -     She  reports that she has never smoked. She has never used smokeless tobacco. She reports current alcohol use. She reports that she does not use drugs.  -     Her family history includes Cataracts in her father; Diabetes in her unknown relative; Hypertension in her unknown relative; No Known Problems in her brother, maternal aunt, maternal grandfather, maternal grandmother, maternal uncle, mother, paternal aunt, paternal grandfather, paternal grandmother, paternal uncle, and sister.  -     She is allergic to ace inhibitors.    Objective:     Physical Exam:  Vitals:  BP (!) 153/80   Pulse 82   Temp 98.2 °F (36.8 °C) (Tympanic)   Wt 68 kg (149 lb 14.6 oz)   BMI 27.42 kg/m²   General appearance:  Well developed, well nourished    Eyes:  Extraocular motions intact, PERRL    Communication:  no hoarseness, no dysphonia    Ears:  Otoscopy of external auditory canals and tympanic membranes was normal, clinical speech reception thresholds grossly intact, no mass/lesion of auricle.  Nose:  No masses/lesions of external nose, nasal mucosa, septum, and turbinates were within normal limits.  Mouth:  No mass/lesion of lips, teeth, gums, hard/soft palate, tongue, tonsils, or oropharynx.    Cardiovascular:  No pedal edema; Radial Pulses +2     Neck & Lymphatics:  No cervical lymphadenopathy, no neck mass/crepitus/ asymmetry, trachea is midline, no thyroid enlargement/tenderness/mass.    Psych: Oriented x3,  Alert with normal mood and affect.     Respiration/Chest:  Symmetric expansion  during respiration, normal respiratory effort.    Skin:  Warm and intact. No ulcerations of face, scalp, neck.      Assessment & Plan:   Gastroesophageal reflux disease, esophagitis presence not specified  -     Ambulatory referral/consult to Gastroenterology; Future; Expected date: 07/31/2020    Globus sensation  -     Ambulatory referral/consult to ENT    The area that she is concerned about in the back of the mouth is the arrangement of the circumvallate papillae.  I showed her photographs of these to confirm that this is what she is noting and she said that it is.  I reassured her that these are normal structures.  She has had prior normal swallow studies and really has no difficulty in the swallow function but has delayed passage of food once it enters the esophagus by history.  She also has significant belching and heartburn.  I would recommend GI evaluation.  Fiberoptic laryngoscopy not performed due to policy requiring COVID testing within 48 hours; however, pretest probability of finding malignancy is low.    We discussed her medical conditions, treatments and plan.  Brianne should return to clinic if any issues arise (symptoms worsen or persist), otherwise we will see her back in the clinic only as needed.      Thank you for allowing me to participate in the care of Brianne.       Jeronimo Hernandez MD, FACS  [facility]   [facility]  [address]  [place] [address]  P: [phone]  F: [phone]

## 2020-07-31 ENCOUNTER — TELEPHONE (OUTPATIENT)
Dept: INTERNAL MEDICINE | Facility: CLINIC | Age: 75
End: 2020-07-31

## 2020-07-31 NOTE — TELEPHONE ENCOUNTER
Pt's daughter stated that she is returning a call to GI to schedule an appt for her mother. Transferred to scheduling to schedule appt appropriately.

## 2020-07-31 NOTE — TELEPHONE ENCOUNTER
----- Message from Gem Wu sent at 7/31/2020 10:28 AM CDT -----  Regarding: returning a call  Contact: lana / daughter  Type:  Patient Returning Call    Who Called:lana  Who Left Message for Patient:nurse  Does the patient know what this is regarding?:maybe a referral  Would the patient rather a call back or a response via [facility]? Call back  Best Call Back Number:[phone] ([place])   Additional Information: none

## 2020-09-29 ENCOUNTER — PATIENT MESSAGE (OUTPATIENT)
Dept: OTHER | Facility: OTHER | Age: 75
End: 2020-09-29

## 2020-11-10 ENCOUNTER — PATIENT OUTREACH (OUTPATIENT)
Dept: ADMINISTRATIVE | Facility: HOSPITAL | Age: 75
End: 2020-11-10

## 2020-11-10 NOTE — PROGRESS NOTES
Working HTN report for:     Called to discuss scheduling appointment and to get updated BP reading. No answer

## 2020-11-13 ENCOUNTER — PATIENT OUTREACH (OUTPATIENT)
Dept: ADMINISTRATIVE | Facility: HOSPITAL | Age: 75
End: 2020-11-13

## 2020-11-13 DIAGNOSIS — E11.9 TYPE 2 DIABETES MELLITUS WITHOUT COMPLICATION: ICD-10-CM

## 2020-11-13 NOTE — PROGRESS NOTES
[employer] HTN Report: Attempted to reach out to Pt about blood pressure & annual PCP visit. Pt did not answer left voicemail with callback number. Portal msg sent.

## 2020-11-17 ENCOUNTER — SPECIALTY PHARMACY (OUTPATIENT)
Dept: PHARMACY | Facility: CLINIC | Age: 75
End: 2020-11-17

## 2020-11-17 NOTE — TELEPHONE ENCOUNTER
Specialty Pharmacy - Refill Coordination    Specialty Medication Orders Linked to Encounter      Most Recent Value   Medication #1  alirocumab (PRALUENT PEN) 150 mg/mL PnIj (Order#[id number], Rx#[id number])          Refill Questions - Documented Responses      Most Recent Value   Relationship to patient of person spoken to?  Self   HIPAA/medical authority confirmed?  Yes   When does the patient need to receive the medication?  11/20/20   Shipping Address  Home   Address in [facility] confirmed and updated if neccessary?  Yes   Expected Copay ($)  0   Is the patient able to afford the medication copay?  Yes   Payment Method  zero copay   Days supply of Refill  28   Would patient like to speak to a pharmacist?  No   Do you want to trigger an intervention?  No   Do you want to trigger an additional referral task?  No   Refill activity completed?  Yes   Refill activity plan  Refill scheduled   Shipment/Pickup Date:  11/18/20          Current Outpatient Medications   Medication Sig    alirocumab (PRALUENT PEN) 150 mg/mL PnIj Inject 1 mL (150 mg total) into the skin every 14 (fourteen) days.    amLODIPine (NORVASC) 10 MG tablet Take 1 tablet (10 mg total) by mouth once daily.    aspirin (ECOTRIN) 81 MG EC tablet     blood sugar diagnostic Strp Contour test strips. Test bid-tid.    cholecalciferol, vitamin D3, 125 mcg (5,000 unit) capsule Take 1 capsule (5,000 Units total) by mouth once daily.    denosumab (PROLIA) 60 mg/mL Syrg Inject 1 mL (60 mg total) into the skin every 6 (six) months.    fluticasone propionate (FLONASE) 50 mcg/actuation nasal spray 1 spray (50 mcg total) by Each Nostril route once daily.    gabapentin (NEURONTIN) 300 MG capsule Take 1 capsule (300 mg total) by mouth 2 (two) times daily.    glucagon (human recombinant) inj 1mg/mL kit Inject 1 mg into the muscle as needed.    insulin aspart U-100 (NOVOLOG FLEXPEN U-100 INSULIN) 100 unit/mL (3 mL) InPn pen Inject 12-22 Units into the  "skin 3 (three) times daily with meals. 1 units with each meal    insulin glargine U-300 conc (TOUJEO MAX U-300 SOLOSTAR) 300 unit/mL (3 mL) InPn Inject 30 Units into the skin once daily.    insulin glulisine U-100 (APIDRA) 100 unit/mL injection Inject into the skin 3 (three) times daily before meals.    insulin needles, disposable, (BD INSULIN PEN NEEDLE UF MINI) 31 x 3/16 " Ndle Inject 1 Units into the skin every evening.    levocetirizine (XYZAL) 5 MG tablet Take 1 tablet (5 mg total) by mouth every evening.    meloxicam (MOBIC) 15 MG tablet TAKE 1 TABLET EVERY DAY    metFORMIN (GLUCOPHAGE) 1000 MG tablet Take 1 tablet (1,000 mg total) by mouth 2 (two) times daily with meals.    multivitamin Liqd Take by mouth.    olopatadine (PAZEO) 0.7 % Drop Place 1 drop into both eyes once daily. for 365 doses    omega-3 acid ethyl esters (LOVAZA) 1 gram capsule Take 2 g by mouth 2 (two) times daily.    omeprazole (PRILOSEC) 40 MG capsule Take 1 capsule (40 mg total) by mouth once daily.    simvastatin (ZOCOR) 40 MG tablet Take 1 tablet (40 mg total) by mouth every evening.    valsartan (DIOVAN) 320 MG tablet Take 1 tablet (320 mg total) by mouth once daily.    venlafaxine (EFFEXOR) 75 MG tablet Take 75 mg by mouth 2 (two) times daily.   Last reviewed on 7/24/2020  1:51 PM by Jeronimo Hernandez MD    Review of patient's allergies indicates:   Allergen Reactions    Ace inhibitors      Other reaction(s): cough    Last reviewed on  7/24/2020 1:51 PM by Jeronimo Hernandez      Tasks added this encounter   No tasks added.   Tasks due within next 3 months   10/29/2020 - Refill Call     Lee Tompkins  [facility]  [address] [place] [id number]  Phone: [phone]  Fax: [phone]      "

## 2020-12-14 ENCOUNTER — SPECIALTY PHARMACY (OUTPATIENT)
Dept: PHARMACY | Facility: CLINIC | Age: 75
End: 2020-12-14

## 2020-12-14 NOTE — TELEPHONE ENCOUNTER
Specialty Pharmacy - Refill Coordination    Specialty Medication Orders Linked to Encounter      Most Recent Value   Medication #1  alirocumab (PRALUENT PEN) 150 mg/mL PnIj (Order#[id number], Rx#[id number])          Refill Questions - Documented Responses      Most Recent Value   Relationship to patient of person spoken to?  Self   HIPAA/medical authority confirmed?  Yes   How many doses does the patient have on hand?  0   How many days does the patient report on hand quantity will last?  4   How will the patient receive the medication?  Mail   When does the patient need to receive the medication?  12/18/20   Shipping Address  Home   Address in [facility] confirmed and updated if neccessary?  Yes   Expected Copay ($)  0   Is the patient able to afford the medication copay?  Yes   Payment Method  zero copay   Days supply of Refill  28   Would patient like to speak to a pharmacist?  No   Do you want to trigger an intervention?  No   Do you want to trigger an additional referral task?  No   Refill activity completed?  Yes   Refill activity plan  Refill scheduled   Shipment/Pickup Date:  12/15/20          Current Outpatient Medications   Medication Sig    alirocumab (PRALUENT PEN) 150 mg/mL PnIj Inject 1 mL (150 mg total) into the skin every 14 (fourteen) days.    amLODIPine (NORVASC) 10 MG tablet Take 1 tablet (10 mg total) by mouth once daily.    aspirin (ECOTRIN) 81 MG EC tablet     blood sugar diagnostic Strp Contour test strips. Test bid-tid.    cholecalciferol, vitamin D3, 125 mcg (5,000 unit) capsule Take 1 capsule (5,000 Units total) by mouth once daily.    denosumab (PROLIA) 60 mg/mL Syrg Inject 1 mL (60 mg total) into the skin every 6 (six) months.    fluticasone propionate (FLONASE) 50 mcg/actuation nasal spray 1 spray (50 mcg total) by Each Nostril route once daily.    gabapentin (NEURONTIN) 300 MG capsule Take 1 capsule (300 mg total) by mouth 2 (two) times daily.    glucagon (human  "recombinant) inj 1mg/mL kit Inject 1 mg into the muscle as needed.    insulin aspart U-100 (NOVOLOG FLEXPEN U-100 INSULIN) 100 unit/mL (3 mL) InPn pen Inject 12-22 Units into the skin 3 (three) times daily with meals. 1 units with each meal    insulin glargine U-300 conc (TOUJEO MAX U-300 SOLOSTAR) 300 unit/mL (3 mL) InPn Inject 30 Units into the skin once daily.    insulin glulisine U-100 (APIDRA) 100 unit/mL injection Inject into the skin 3 (three) times daily before meals.    insulin needles, disposable, (BD INSULIN PEN NEEDLE UF MINI) 31 x 3/16 " Ndle Inject 1 Units into the skin every evening.    levocetirizine (XYZAL) 5 MG tablet Take 1 tablet (5 mg total) by mouth every evening.    meloxicam (MOBIC) 15 MG tablet TAKE 1 TABLET EVERY DAY    metFORMIN (GLUCOPHAGE) 1000 MG tablet Take 1 tablet (1,000 mg total) by mouth 2 (two) times daily with meals.    multivitamin Liqd Take by mouth.    olopatadine (PAZEO) 0.7 % Drop Place 1 drop into both eyes once daily. for 365 doses    omega-3 acid ethyl esters (LOVAZA) 1 gram capsule Take 2 g by mouth 2 (two) times daily.    omeprazole (PRILOSEC) 40 MG capsule Take 1 capsule (40 mg total) by mouth once daily.    simvastatin (ZOCOR) 40 MG tablet Take 1 tablet (40 mg total) by mouth every evening.    valsartan (DIOVAN) 320 MG tablet Take 1 tablet (320 mg total) by mouth once daily.    venlafaxine (EFFEXOR) 75 MG tablet Take 75 mg by mouth 2 (two) times daily.   Last reviewed on 7/24/2020  1:51 PM by Jeronimo Hernandez MD    Review of patient's allergies indicates:   Allergen Reactions    Ace inhibitors      Other reaction(s): cough    Last reviewed on  7/24/2020 1:51 PM by Jeronimo Hernandez      Tasks added this encounter   1/6/2021 - Refill Call (Auto Added)   Tasks due within next 3 months   No tasks due.     [facility]  [address] [place] [id number]  Phone: [phone]  Fax: [phone] "

## 2020-12-22 RX ORDER — GABAPENTIN 300 MG/1
300 CAPSULE ORAL 2 TIMES DAILY
Qty: 180 CAPSULE | Refills: 0 | Status: SHIPPED | OUTPATIENT
Start: 2020-12-22 | End: 2021-03-17 | Stop reason: SDUPTHER

## 2020-12-22 NOTE — TELEPHONE ENCOUNTER
Patient overdue for follow up.  Refill is given but the patient needs an appointment with Dr. Tran or Natalya Fleming for follow up.

## 2021-01-07 ENCOUNTER — SPECIALTY PHARMACY (OUTPATIENT)
Dept: PHARMACY | Facility: CLINIC | Age: 76
End: 2021-01-07

## 2021-02-02 ENCOUNTER — PATIENT OUTREACH (OUTPATIENT)
Dept: ADMINISTRATIVE | Facility: OTHER | Age: 76
End: 2021-02-02

## 2021-02-03 ENCOUNTER — OFFICE VISIT (OUTPATIENT)
Dept: RHEUMATOLOGY | Facility: CLINIC | Age: 76
End: 2021-02-03
Payer: MEDICARE

## 2021-02-03 ENCOUNTER — INFUSION (OUTPATIENT)
Dept: INFUSION THERAPY | Facility: HOSPITAL | Age: 76
End: 2021-02-03
Attending: INTERNAL MEDICINE
Payer: MEDICARE

## 2021-02-03 VITALS — DIASTOLIC BLOOD PRESSURE: 76 MMHG | HEART RATE: 85 BPM | SYSTOLIC BLOOD PRESSURE: 129 MMHG

## 2021-02-03 VITALS
TEMPERATURE: 97 F | RESPIRATION RATE: 16 BRPM | OXYGEN SATURATION: 98 % | HEART RATE: 80 BPM | SYSTOLIC BLOOD PRESSURE: 142 MMHG | DIASTOLIC BLOOD PRESSURE: 74 MMHG

## 2021-02-03 DIAGNOSIS — M15.9 PRIMARY OSTEOARTHRITIS INVOLVING MULTIPLE JOINTS: ICD-10-CM

## 2021-02-03 DIAGNOSIS — M81.0 OSTEOPOROSIS, POSTMENOPAUSAL: Primary | ICD-10-CM

## 2021-02-03 DIAGNOSIS — Z71.89 COUNSELING ON HEALTH PROMOTION AND DISEASE PREVENTION: ICD-10-CM

## 2021-02-03 PROCEDURE — 3078F DIAST BP <80 MM HG: CPT | Mod: CPTII,S$GLB,, | Performed by: INTERNAL MEDICINE

## 2021-02-03 PROCEDURE — 99214 OFFICE O/P EST MOD 30 MIN: CPT | Mod: S$GLB,,, | Performed by: INTERNAL MEDICINE

## 2021-02-03 PROCEDURE — 96372 THER/PROPH/DIAG INJ SC/IM: CPT

## 2021-02-03 PROCEDURE — 1101F PR PT FALLS ASSESS DOC 0-1 FALLS W/OUT INJ PAST YR: ICD-10-PCS | Mod: CPTII,S$GLB,, | Performed by: INTERNAL MEDICINE

## 2021-02-03 PROCEDURE — 3288F FALL RISK ASSESSMENT DOCD: CPT | Mod: CPTII,S$GLB,, | Performed by: INTERNAL MEDICINE

## 2021-02-03 PROCEDURE — 99999 PR PBB SHADOW E&M-EST. PATIENT-LVL II: CPT | Mod: PBBFAC,,, | Performed by: INTERNAL MEDICINE

## 2021-02-03 PROCEDURE — 63600175 PHARM REV CODE 636 W HCPCS: Mod: JG | Performed by: INTERNAL MEDICINE

## 2021-02-03 PROCEDURE — 1159F PR MEDICATION LIST DOCUMENTED IN MEDICAL RECORD: ICD-10-PCS | Mod: S$GLB,,, | Performed by: INTERNAL MEDICINE

## 2021-02-03 PROCEDURE — 3078F PR MOST RECENT DIASTOLIC BLOOD PRESSURE < 80 MM HG: ICD-10-PCS | Mod: CPTII,S$GLB,, | Performed by: INTERNAL MEDICINE

## 2021-02-03 PROCEDURE — 3288F PR FALLS RISK ASSESSMENT DOCUMENTED: ICD-10-PCS | Mod: CPTII,S$GLB,, | Performed by: INTERNAL MEDICINE

## 2021-02-03 PROCEDURE — 3074F PR MOST RECENT SYSTOLIC BLOOD PRESSURE < 130 MM HG: ICD-10-PCS | Mod: CPTII,S$GLB,, | Performed by: INTERNAL MEDICINE

## 2021-02-03 PROCEDURE — 1159F MED LIST DOCD IN RCRD: CPT | Mod: S$GLB,,, | Performed by: INTERNAL MEDICINE

## 2021-02-03 PROCEDURE — 1125F PR PAIN SEVERITY QUANTIFIED, PAIN PRESENT: ICD-10-PCS | Mod: S$GLB,,, | Performed by: INTERNAL MEDICINE

## 2021-02-03 PROCEDURE — 99999 PR PBB SHADOW E&M-EST. PATIENT-LVL II: ICD-10-PCS | Mod: PBBFAC,,, | Performed by: INTERNAL MEDICINE

## 2021-02-03 PROCEDURE — 99214 PR OFFICE/OUTPT VISIT, EST, LEVL IV, 30-39 MIN: ICD-10-PCS | Mod: S$GLB,,, | Performed by: INTERNAL MEDICINE

## 2021-02-03 PROCEDURE — 1101F PT FALLS ASSESS-DOCD LE1/YR: CPT | Mod: CPTII,S$GLB,, | Performed by: INTERNAL MEDICINE

## 2021-02-03 PROCEDURE — 3074F SYST BP LT 130 MM HG: CPT | Mod: CPTII,S$GLB,, | Performed by: INTERNAL MEDICINE

## 2021-02-03 PROCEDURE — 1125F AMNT PAIN NOTED PAIN PRSNT: CPT | Mod: S$GLB,,, | Performed by: INTERNAL MEDICINE

## 2021-02-03 RX ORDER — CYCLOBENZAPRINE HCL 10 MG
10 TABLET ORAL NIGHTLY
Qty: 30 TABLET | Refills: 0 | Status: SHIPPED | OUTPATIENT
Start: 2021-02-03 | End: 2021-02-13

## 2021-02-03 RX ADMIN — DENOSUMAB 60 MG: 60 INJECTION SUBCUTANEOUS at 04:02

## 2021-02-10 ENCOUNTER — SPECIALTY PHARMACY (OUTPATIENT)
Dept: PHARMACY | Facility: CLINIC | Age: 76
End: 2021-02-10

## 2021-02-10 ENCOUNTER — PATIENT MESSAGE (OUTPATIENT)
Dept: PHARMACY | Facility: CLINIC | Age: 76
End: 2021-02-10

## 2021-02-15 ENCOUNTER — SPECIALTY PHARMACY (OUTPATIENT)
Dept: PHARMACY | Facility: CLINIC | Age: 76
End: 2021-02-15

## 2021-03-12 ENCOUNTER — PATIENT OUTREACH (OUTPATIENT)
Dept: ADMINISTRATIVE | Facility: HOSPITAL | Age: 76
End: 2021-03-12

## 2021-03-17 ENCOUNTER — OFFICE VISIT (OUTPATIENT)
Dept: INTERNAL MEDICINE | Facility: CLINIC | Age: 76
End: 2021-03-17
Payer: MEDICARE

## 2021-03-17 ENCOUNTER — LAB VISIT (OUTPATIENT)
Dept: LAB | Facility: HOSPITAL | Age: 76
End: 2021-03-17
Attending: PHYSICIAN ASSISTANT
Payer: MEDICARE

## 2021-03-17 ENCOUNTER — OFFICE VISIT (OUTPATIENT)
Dept: OPHTHALMOLOGY | Facility: CLINIC | Age: 76
End: 2021-03-17
Payer: MEDICARE

## 2021-03-17 VITALS
SYSTOLIC BLOOD PRESSURE: 136 MMHG | DIASTOLIC BLOOD PRESSURE: 72 MMHG | HEIGHT: 62 IN | WEIGHT: 154.31 LBS | TEMPERATURE: 97 F | BODY MASS INDEX: 28.39 KG/M2 | HEART RATE: 70 BPM

## 2021-03-17 DIAGNOSIS — F41.8 MIXED ANXIETY AND DEPRESSIVE DISORDER: ICD-10-CM

## 2021-03-17 DIAGNOSIS — K21.9 GASTROESOPHAGEAL REFLUX DISEASE WITHOUT ESOPHAGITIS: ICD-10-CM

## 2021-03-17 DIAGNOSIS — E11.9 TYPE 2 DIABETES MELLITUS WITHOUT COMPLICATION, WITHOUT LONG-TERM CURRENT USE OF INSULIN: ICD-10-CM

## 2021-03-17 DIAGNOSIS — E11.9 DIABETES MELLITUS WITHOUT COMPLICATION: Primary | ICD-10-CM

## 2021-03-17 DIAGNOSIS — I15.2 HYPERTENSION ASSOCIATED WITH DIABETES: ICD-10-CM

## 2021-03-17 DIAGNOSIS — Z79.4 TYPE 2 DIABETES MELLITUS WITHOUT COMPLICATION, WITH LONG-TERM CURRENT USE OF INSULIN: ICD-10-CM

## 2021-03-17 DIAGNOSIS — H40.003 GLAUCOMA SUSPECT OF BOTH EYES: ICD-10-CM

## 2021-03-17 DIAGNOSIS — E11.69 HYPERLIPIDEMIA ASSOCIATED WITH TYPE 2 DIABETES MELLITUS: ICD-10-CM

## 2021-03-17 DIAGNOSIS — E78.5 HYPERLIPIDEMIA ASSOCIATED WITH TYPE 2 DIABETES MELLITUS: ICD-10-CM

## 2021-03-17 DIAGNOSIS — H10.13 ALLERGIC CONJUNCTIVITIS, BILATERAL: ICD-10-CM

## 2021-03-17 DIAGNOSIS — E11.59 HYPERTENSION ASSOCIATED WITH DIABETES: ICD-10-CM

## 2021-03-17 DIAGNOSIS — E11.9 TYPE 2 DIABETES MELLITUS WITHOUT COMPLICATION, WITHOUT LONG-TERM CURRENT USE OF INSULIN: Primary | ICD-10-CM

## 2021-03-17 DIAGNOSIS — Z12.11 COLON CANCER SCREENING: ICD-10-CM

## 2021-03-17 DIAGNOSIS — E11.9 TYPE 2 DIABETES MELLITUS WITHOUT COMPLICATION, WITH LONG-TERM CURRENT USE OF INSULIN: ICD-10-CM

## 2021-03-17 DIAGNOSIS — H04.123 DRY EYES, BILATERAL: ICD-10-CM

## 2021-03-17 DIAGNOSIS — Z96.1 PSEUDOPHAKIA, BOTH EYES: ICD-10-CM

## 2021-03-17 LAB
BASOPHILS # BLD AUTO: 0.04 K/UL (ref 0–0.2)
BASOPHILS NFR BLD: 0.7 % (ref 0–1.9)
DIFFERENTIAL METHOD: ABNORMAL
EOSINOPHIL # BLD AUTO: 0.2 K/UL (ref 0–0.5)
EOSINOPHIL NFR BLD: 2.8 % (ref 0–8)
ERYTHROCYTE [DISTWIDTH] IN BLOOD BY AUTOMATED COUNT: 13.8 % (ref 11.5–14.5)
HCT VFR BLD AUTO: 39.7 % (ref 37–48.5)
HGB BLD-MCNC: 12.6 G/DL (ref 12–16)
IMM GRANULOCYTES # BLD AUTO: 0.01 K/UL (ref 0–0.04)
IMM GRANULOCYTES NFR BLD AUTO: 0.2 % (ref 0–0.5)
LYMPHOCYTES # BLD AUTO: 1.8 K/UL (ref 1–4.8)
LYMPHOCYTES NFR BLD: 33.3 % (ref 18–48)
MCH RBC QN AUTO: 28.8 PG (ref 27–31)
MCHC RBC AUTO-ENTMCNC: 31.7 G/DL (ref 32–36)
MCV RBC AUTO: 91 FL (ref 82–98)
MONOCYTES # BLD AUTO: 0.4 K/UL (ref 0.3–1)
MONOCYTES NFR BLD: 6.5 % (ref 4–15)
NEUTROPHILS # BLD AUTO: 3.1 K/UL (ref 1.8–7.7)
NEUTROPHILS NFR BLD: 56.5 % (ref 38–73)
NRBC BLD-RTO: 0 /100 WBC
PLATELET # BLD AUTO: 258 K/UL (ref 150–350)
PMV BLD AUTO: 11.2 FL (ref 9.2–12.9)
RBC # BLD AUTO: 4.38 M/UL (ref 4–5.4)
WBC # BLD AUTO: 5.41 K/UL (ref 3.9–12.7)

## 2021-03-17 PROCEDURE — 99499 RISK ADDL DX/OHS AUDIT: ICD-10-PCS | Mod: ,,, | Performed by: INTERNAL MEDICINE

## 2021-03-17 PROCEDURE — 82570 ASSAY OF URINE CREATININE: CPT | Performed by: INTERNAL MEDICINE

## 2021-03-17 PROCEDURE — 1126F AMNT PAIN NOTED NONE PRSNT: CPT | Mod: S$GLB,,, | Performed by: INTERNAL MEDICINE

## 2021-03-17 PROCEDURE — 99499 UNLISTED E&M SERVICE: CPT | Mod: S$GLB,,, | Performed by: INTERNAL MEDICINE

## 2021-03-17 PROCEDURE — 99999 PR PBB SHADOW E&M-EST. PATIENT-LVL III: CPT | Mod: PBBFAC,,, | Performed by: OPTOMETRIST

## 2021-03-17 PROCEDURE — 85025 COMPLETE CBC W/AUTO DIFF WBC: CPT | Performed by: INTERNAL MEDICINE

## 2021-03-17 PROCEDURE — 99999 PR PBB SHADOW E&M-EST. PATIENT-LVL IV: ICD-10-PCS | Mod: PBBFAC,,, | Performed by: INTERNAL MEDICINE

## 2021-03-17 PROCEDURE — 82043 UR ALBUMIN QUANTITATIVE: CPT | Performed by: INTERNAL MEDICINE

## 2021-03-17 PROCEDURE — 99214 PR OFFICE/OUTPT VISIT, EST, LEVL IV, 30-39 MIN: ICD-10-PCS | Mod: S$GLB,,, | Performed by: INTERNAL MEDICINE

## 2021-03-17 PROCEDURE — 3075F PR MOST RECENT SYSTOLIC BLOOD PRESS GE 130-139MM HG: ICD-10-PCS | Mod: CPTII,S$GLB,, | Performed by: INTERNAL MEDICINE

## 2021-03-17 PROCEDURE — 99999 PR PBB SHADOW E&M-EST. PATIENT-LVL IV: CPT | Mod: PBBFAC,,, | Performed by: INTERNAL MEDICINE

## 2021-03-17 PROCEDURE — 3288F PR FALLS RISK ASSESSMENT DOCUMENTED: ICD-10-PCS | Mod: CPTII,S$GLB,, | Performed by: INTERNAL MEDICINE

## 2021-03-17 PROCEDURE — 3075F SYST BP GE 130 - 139MM HG: CPT | Mod: CPTII,S$GLB,, | Performed by: INTERNAL MEDICINE

## 2021-03-17 PROCEDURE — 1101F PT FALLS ASSESS-DOCD LE1/YR: CPT | Mod: CPTII,S$GLB,, | Performed by: INTERNAL MEDICINE

## 2021-03-17 PROCEDURE — 99999 PR PBB SHADOW E&M-EST. PATIENT-LVL III: ICD-10-PCS | Mod: PBBFAC,,, | Performed by: OPTOMETRIST

## 2021-03-17 PROCEDURE — 2023F PR DILATED RETINAL EXAM W/O EVID OF RETINOPATHY: ICD-10-PCS | Mod: S$GLB,,, | Performed by: OPTOMETRIST

## 2021-03-17 PROCEDURE — 92014 COMPRE OPH EXAM EST PT 1/>: CPT | Mod: S$GLB,,, | Performed by: OPTOMETRIST

## 2021-03-17 PROCEDURE — 92014 PR EYE EXAM, EST PATIENT,COMPREHESV: ICD-10-PCS | Mod: S$GLB,,, | Performed by: OPTOMETRIST

## 2021-03-17 PROCEDURE — 83036 HEMOGLOBIN GLYCOSYLATED A1C: CPT | Performed by: INTERNAL MEDICINE

## 2021-03-17 PROCEDURE — 36415 COLL VENOUS BLD VENIPUNCTURE: CPT | Mod: PO | Performed by: INTERNAL MEDICINE

## 2021-03-17 PROCEDURE — 3288F FALL RISK ASSESSMENT DOCD: CPT | Mod: CPTII,S$GLB,, | Performed by: INTERNAL MEDICINE

## 2021-03-17 PROCEDURE — 80053 COMPREHEN METABOLIC PANEL: CPT | Performed by: INTERNAL MEDICINE

## 2021-03-17 PROCEDURE — 99214 OFFICE O/P EST MOD 30 MIN: CPT | Mod: S$GLB,,, | Performed by: INTERNAL MEDICINE

## 2021-03-17 PROCEDURE — 3078F PR MOST RECENT DIASTOLIC BLOOD PRESSURE < 80 MM HG: ICD-10-PCS | Mod: CPTII,S$GLB,, | Performed by: INTERNAL MEDICINE

## 2021-03-17 PROCEDURE — 99499 RISK ADDL DX/OHS AUDIT: ICD-10-PCS | Mod: S$GLB,,, | Performed by: INTERNAL MEDICINE

## 2021-03-17 PROCEDURE — 80061 LIPID PANEL: CPT | Performed by: INTERNAL MEDICINE

## 2021-03-17 PROCEDURE — 1126F PR PAIN SEVERITY QUANTIFIED, NO PAIN PRESENT: ICD-10-PCS | Mod: S$GLB,,, | Performed by: INTERNAL MEDICINE

## 2021-03-17 PROCEDURE — 3078F DIAST BP <80 MM HG: CPT | Mod: CPTII,S$GLB,, | Performed by: INTERNAL MEDICINE

## 2021-03-17 PROCEDURE — 1101F PR PT FALLS ASSESS DOC 0-1 FALLS W/OUT INJ PAST YR: ICD-10-PCS | Mod: CPTII,S$GLB,, | Performed by: INTERNAL MEDICINE

## 2021-03-17 PROCEDURE — 2023F DILAT RTA XM W/O RTNOPTHY: CPT | Mod: S$GLB,,, | Performed by: OPTOMETRIST

## 2021-03-17 PROCEDURE — 99499 UNLISTED E&M SERVICE: CPT | Mod: ,,, | Performed by: INTERNAL MEDICINE

## 2021-03-17 PROCEDURE — 92015 DETERMINE REFRACTIVE STATE: CPT | Mod: S$GLB,,, | Performed by: OPTOMETRIST

## 2021-03-17 PROCEDURE — 92015 PR REFRACTION: ICD-10-PCS | Mod: S$GLB,,, | Performed by: OPTOMETRIST

## 2021-03-17 RX ORDER — OMEGA-3-ACID ETHYL ESTERS 1 G/1
2 CAPSULE, LIQUID FILLED ORAL 2 TIMES DAILY
Qty: 180 CAPSULE | Refills: 3 | Status: SHIPPED | OUTPATIENT
Start: 2021-03-17 | End: 2021-08-04 | Stop reason: SDUPTHER

## 2021-03-17 RX ORDER — METFORMIN HYDROCHLORIDE 1000 MG/1
1000 TABLET ORAL 2 TIMES DAILY WITH MEALS
Qty: 180 TABLET | Refills: 3 | Status: SHIPPED | OUTPATIENT
Start: 2021-03-17 | End: 2021-07-26

## 2021-03-17 RX ORDER — CYCLOSPORINE 0.5 MG/ML
1 EMULSION OPHTHALMIC 2 TIMES DAILY
Qty: 180 VIAL | Refills: 2 | Status: SHIPPED | OUTPATIENT
Start: 2021-03-17 | End: 2021-08-04 | Stop reason: SDUPTHER

## 2021-03-17 RX ORDER — OMEPRAZOLE 40 MG/1
40 CAPSULE, DELAYED RELEASE ORAL DAILY
Qty: 90 CAPSULE | Refills: 3 | Status: SHIPPED | OUTPATIENT
Start: 2021-03-17 | End: 2021-07-26

## 2021-03-17 RX ORDER — VALSARTAN 320 MG/1
320 TABLET ORAL DAILY
Qty: 90 TABLET | Refills: 3 | Status: SHIPPED | OUTPATIENT
Start: 2021-03-17 | End: 2021-08-04 | Stop reason: SDUPTHER

## 2021-03-17 RX ORDER — HYDROXYCHLOROQUINE SULFATE 200 MG/1
TABLET, FILM COATED ORAL DAILY
COMMUNITY
End: 2021-03-17

## 2021-03-17 RX ORDER — INSULIN ASPART 100 [IU]/ML
12-22 INJECTION, SOLUTION INTRAVENOUS; SUBCUTANEOUS
Qty: 60 ML | Refills: 3 | Status: SHIPPED | OUTPATIENT
Start: 2021-03-17 | End: 2021-03-29

## 2021-03-17 RX ORDER — GABAPENTIN 300 MG/1
300 CAPSULE ORAL 2 TIMES DAILY
Qty: 180 CAPSULE | Refills: 0 | Status: SHIPPED | OUTPATIENT
Start: 2021-03-17 | End: 2021-07-26

## 2021-03-17 RX ORDER — CEPHALEXIN 500 MG/1
500 CAPSULE ORAL 4 TIMES DAILY
COMMUNITY
End: 2021-03-17

## 2021-03-17 RX ORDER — INSULIN GLARGINE 300 U/ML
30 INJECTION, SOLUTION SUBCUTANEOUS DAILY
Qty: 12 ML | Refills: 3 | Status: SHIPPED | OUTPATIENT
Start: 2021-03-17 | End: 2021-06-15

## 2021-03-17 RX ORDER — SIMVASTATIN 40 MG/1
40 TABLET, FILM COATED ORAL NIGHTLY
Qty: 90 TABLET | Refills: 3 | Status: SHIPPED | OUTPATIENT
Start: 2021-03-17 | End: 2021-07-26

## 2021-03-17 RX ORDER — ERGOCALCIFEROL 1.25 MG/1
CAPSULE ORAL
COMMUNITY
Start: 2021-01-16 | End: 2021-03-17 | Stop reason: SDUPTHER

## 2021-03-17 RX ORDER — VENLAFAXINE 75 MG/1
75 TABLET ORAL 2 TIMES DAILY
Qty: 180 TABLET | Refills: 3 | Status: SHIPPED | OUTPATIENT
Start: 2021-03-17 | End: 2021-07-26

## 2021-03-18 LAB
ALBUMIN SERPL BCP-MCNC: 4.1 G/DL (ref 3.5–5.2)
ALBUMIN/CREAT UR: 12.7 UG/MG (ref 0–30)
ALP SERPL-CCNC: 55 U/L (ref 55–135)
ALT SERPL W/O P-5'-P-CCNC: 34 U/L (ref 10–44)
ANION GAP SERPL CALC-SCNC: 8 MMOL/L (ref 8–16)
AST SERPL-CCNC: 34 U/L (ref 10–40)
BILIRUB SERPL-MCNC: 0.4 MG/DL (ref 0.1–1)
BUN SERPL-MCNC: 10 MG/DL (ref 8–23)
CALCIUM SERPL-MCNC: 9.1 MG/DL (ref 8.7–10.5)
CHLORIDE SERPL-SCNC: 106 MMOL/L (ref 95–110)
CHOLEST SERPL-MCNC: 74 MG/DL (ref 120–199)
CHOLEST/HDLC SERPL: 1.5 {RATIO} (ref 2–5)
CO2 SERPL-SCNC: 27 MMOL/L (ref 23–29)
CREAT SERPL-MCNC: 0.7 MG/DL (ref 0.5–1.4)
CREAT UR-MCNC: 55 MG/DL (ref 15–325)
EST. GFR  (AFRICAN AMERICAN): >60 ML/MIN/1.73 M^2
EST. GFR  (NON AFRICAN AMERICAN): >60 ML/MIN/1.73 M^2
ESTIMATED AVG GLUCOSE: 126 MG/DL (ref 68–131)
GLUCOSE SERPL-MCNC: 102 MG/DL (ref 70–110)
HBA1C MFR BLD: 6 % (ref 4–5.6)
HDLC SERPL-MCNC: 49 MG/DL (ref 40–75)
HDLC SERPL: 66.2 % (ref 20–50)
LDLC SERPL CALC-MCNC: 9.2 MG/DL (ref 63–159)
MICROALBUMIN UR DL<=1MG/L-MCNC: 7 UG/ML
NONHDLC SERPL-MCNC: 25 MG/DL
POTASSIUM SERPL-SCNC: 4.1 MMOL/L (ref 3.5–5.1)
PROT SERPL-MCNC: 7.6 G/DL (ref 6–8.4)
SODIUM SERPL-SCNC: 141 MMOL/L (ref 136–145)
TRIGL SERPL-MCNC: 79 MG/DL (ref 30–150)

## 2021-03-22 RX ORDER — ALIROCUMAB 150 MG/ML
150 INJECTION, SOLUTION SUBCUTANEOUS
Qty: 6 ML | Refills: 3 | Status: SHIPPED | OUTPATIENT
Start: 2021-03-22 | End: 2021-08-04 | Stop reason: SDUPTHER

## 2021-03-23 ENCOUNTER — SPECIALTY PHARMACY (OUTPATIENT)
Dept: PHARMACY | Facility: CLINIC | Age: 76
End: 2021-03-23

## 2021-03-24 ENCOUNTER — OFFICE VISIT (OUTPATIENT)
Dept: OPHTHALMOLOGY | Facility: CLINIC | Age: 76
End: 2021-03-24
Payer: MEDICARE

## 2021-03-24 DIAGNOSIS — H40.1134 PRIMARY OPEN ANGLE GLAUCOMA (POAG) OF BOTH EYES, INDETERMINATE STAGE: Primary | ICD-10-CM

## 2021-03-24 DIAGNOSIS — H04.123 DRY EYES, BILATERAL: ICD-10-CM

## 2021-03-24 DIAGNOSIS — H40.003 GLAUCOMA SUSPECT OF BOTH EYES: ICD-10-CM

## 2021-03-24 PROCEDURE — 92133 POSTERIOR SEGMENT OCT OPTIC NERVE(OCULAR COHERENCE TOMOGRAPHY) - OU - BOTH EYES: ICD-10-PCS | Mod: S$GLB,,, | Performed by: OPTOMETRIST

## 2021-03-24 PROCEDURE — 99213 OFFICE O/P EST LOW 20 MIN: CPT | Mod: S$GLB,,, | Performed by: OPTOMETRIST

## 2021-03-24 PROCEDURE — 1159F PR MEDICATION LIST DOCUMENTED IN MEDICAL RECORD: ICD-10-PCS | Mod: S$GLB,,, | Performed by: OPTOMETRIST

## 2021-03-24 PROCEDURE — 99213 PR OFFICE/OUTPT VISIT, EST, LEVL III, 20-29 MIN: ICD-10-PCS | Mod: S$GLB,,, | Performed by: OPTOMETRIST

## 2021-03-24 PROCEDURE — 92083 HUMPHREY VISUAL FIELD - OU - BOTH EYES: ICD-10-PCS | Mod: S$GLB,,, | Performed by: OPTOMETRIST

## 2021-03-24 PROCEDURE — 92133 CPTRZD OPH DX IMG PST SGM ON: CPT | Mod: S$GLB,,, | Performed by: OPTOMETRIST

## 2021-03-24 PROCEDURE — 92083 EXTENDED VISUAL FIELD XM: CPT | Mod: S$GLB,,, | Performed by: OPTOMETRIST

## 2021-03-24 PROCEDURE — 99999 PR PBB SHADOW E&M-EST. PATIENT-LVL III: CPT | Mod: PBBFAC,,, | Performed by: OPTOMETRIST

## 2021-03-24 PROCEDURE — 1159F MED LIST DOCD IN RCRD: CPT | Mod: S$GLB,,, | Performed by: OPTOMETRIST

## 2021-03-24 PROCEDURE — 99999 PR PBB SHADOW E&M-EST. PATIENT-LVL III: ICD-10-PCS | Mod: PBBFAC,,, | Performed by: OPTOMETRIST

## 2021-03-24 RX ORDER — LATANOPROST 50 UG/ML
1 SOLUTION/ DROPS OPHTHALMIC NIGHTLY
Qty: 2.5 ML | Refills: 1 | Status: SHIPPED | OUTPATIENT
Start: 2021-03-24 | End: 2021-08-04 | Stop reason: SDUPTHER

## 2021-04-18 ENCOUNTER — LAB VISIT (OUTPATIENT)
Dept: OTOLARYNGOLOGY | Facility: CLINIC | Age: 76
End: 2021-04-18
Payer: MEDICARE

## 2021-04-18 DIAGNOSIS — Z01.818 PRE-OP TESTING: ICD-10-CM

## 2021-04-18 PROCEDURE — U0005 INFEC AGEN DETEC AMPLI PROBE: HCPCS | Performed by: INTERNAL MEDICINE

## 2021-04-18 PROCEDURE — U0003 INFECTIOUS AGENT DETECTION BY NUCLEIC ACID (DNA OR RNA); SEVERE ACUTE RESPIRATORY SYNDROME CORONAVIRUS 2 (SARS-COV-2) (CORONAVIRUS DISEASE [COVID-19]), AMPLIFIED PROBE TECHNIQUE, MAKING USE OF HIGH THROUGHPUT TECHNOLOGIES AS DESCRIBED BY CMS-2020-01-R: HCPCS | Performed by: INTERNAL MEDICINE

## 2021-04-19 LAB — SARS-COV-2 RNA RESP QL NAA+PROBE: NOT DETECTED

## 2021-04-20 ENCOUNTER — TELEPHONE (OUTPATIENT)
Dept: GASTROENTEROLOGY | Facility: CLINIC | Age: 76
End: 2021-04-20

## 2021-04-21 ENCOUNTER — ANESTHESIA EVENT (OUTPATIENT)
Dept: ENDOSCOPY | Facility: HOSPITAL | Age: 76
End: 2021-04-21
Payer: MEDICARE

## 2021-04-21 ENCOUNTER — ANESTHESIA (OUTPATIENT)
Dept: ENDOSCOPY | Facility: HOSPITAL | Age: 76
End: 2021-04-21
Payer: MEDICARE

## 2021-04-21 ENCOUNTER — HOSPITAL ENCOUNTER (OUTPATIENT)
Facility: HOSPITAL | Age: 76
Discharge: HOME | End: 2021-04-21
Attending: INTERNAL MEDICINE | Admitting: INTERNAL MEDICINE
Payer: MEDICARE

## 2021-04-21 DIAGNOSIS — Z12.11 ENCOUNTER FOR SCREENING COLONOSCOPY: Primary | ICD-10-CM

## 2021-04-21 LAB
GLUCOSE SERPL-MCNC: 89 MG/DL (ref 70–110)
POCT GLUCOSE: 89 MG/DL (ref 70–110)

## 2021-04-21 PROCEDURE — 37000009 HC ANESTHESIA EA ADD 15 MINS: Performed by: INTERNAL MEDICINE

## 2021-04-21 PROCEDURE — 82962 GLUCOSE BLOOD TEST: CPT | Performed by: INTERNAL MEDICINE

## 2021-04-21 PROCEDURE — 25000003 PHARM REV CODE 250: Performed by: NURSE ANESTHETIST, CERTIFIED REGISTERED

## 2021-04-21 PROCEDURE — 63600175 PHARM REV CODE 636 W HCPCS: Performed by: NURSE ANESTHETIST, CERTIFIED REGISTERED

## 2021-04-21 PROCEDURE — 37000008 HC ANESTHESIA 1ST 15 MINUTES: Performed by: INTERNAL MEDICINE

## 2021-04-21 PROCEDURE — G0121 COLON CA SCRN NOT HI RSK IND: HCPCS | Performed by: INTERNAL MEDICINE

## 2021-04-21 PROCEDURE — G0121 COLON CA SCRN NOT HI RSK IND: HCPCS | Mod: ,,, | Performed by: INTERNAL MEDICINE

## 2021-04-21 PROCEDURE — G0121 COLON CA SCRN NOT HI RSK IND: ICD-10-PCS | Mod: ,,, | Performed by: INTERNAL MEDICINE

## 2021-04-21 RX ORDER — LIDOCAINE HYDROCHLORIDE 10 MG/ML
INJECTION, SOLUTION EPIDURAL; INFILTRATION; INTRACAUDAL; PERINEURAL
Status: DISCONTINUED | OUTPATIENT
Start: 2021-04-21 | End: 2021-04-21

## 2021-04-21 RX ORDER — PROPOFOL 10 MG/ML
VIAL (ML) INTRAVENOUS
Status: DISCONTINUED | OUTPATIENT
Start: 2021-04-21 | End: 2021-04-21

## 2021-04-21 RX ORDER — SODIUM CHLORIDE, SODIUM LACTATE, POTASSIUM CHLORIDE, CALCIUM CHLORIDE 600; 310; 30; 20 MG/100ML; MG/100ML; MG/100ML; MG/100ML
INJECTION, SOLUTION INTRAVENOUS CONTINUOUS
Status: DISCONTINUED | OUTPATIENT
Start: 2021-04-21 | End: 2021-04-21 | Stop reason: HOSPADM

## 2021-04-21 RX ADMIN — LIDOCAINE HYDROCHLORIDE 50 MG: 10 INJECTION, SOLUTION EPIDURAL; INFILTRATION; INTRACAUDAL; PERINEURAL at 09:04

## 2021-04-21 RX ADMIN — PROPOFOL 30 MG: 10 INJECTION, EMULSION INTRAVENOUS at 09:04

## 2021-04-21 RX ADMIN — SODIUM CHLORIDE, SODIUM LACTATE, POTASSIUM CHLORIDE, AND CALCIUM CHLORIDE: .6; .31; .03; .02 INJECTION, SOLUTION INTRAVENOUS at 09:04

## 2021-04-21 RX ADMIN — PROPOFOL 60 MG: 10 INJECTION, EMULSION INTRAVENOUS at 09:04

## 2021-04-22 VITALS
TEMPERATURE: 98 F | RESPIRATION RATE: 17 BRPM | DIASTOLIC BLOOD PRESSURE: 89 MMHG | HEART RATE: 76 BPM | SYSTOLIC BLOOD PRESSURE: 128 MMHG | OXYGEN SATURATION: 95 %

## 2021-05-04 ENCOUNTER — SPECIALTY PHARMACY (OUTPATIENT)
Dept: PHARMACY | Facility: CLINIC | Age: 76
End: 2021-05-04

## 2021-05-04 ENCOUNTER — TELEPHONE (OUTPATIENT)
Dept: INTERNAL MEDICINE | Facility: CLINIC | Age: 76
End: 2021-05-04

## 2021-05-06 ENCOUNTER — TELEPHONE (OUTPATIENT)
Dept: INTERNAL MEDICINE | Facility: CLINIC | Age: 76
End: 2021-05-06

## 2021-07-01 ENCOUNTER — PES CALL (OUTPATIENT)
Dept: ADMINISTRATIVE | Facility: CLINIC | Age: 76
End: 2021-07-01

## 2021-08-04 ENCOUNTER — OFFICE VISIT (OUTPATIENT)
Dept: RHEUMATOLOGY | Facility: CLINIC | Age: 76
End: 2021-08-04
Payer: MEDICARE

## 2021-08-04 ENCOUNTER — LAB VISIT (OUTPATIENT)
Dept: LAB | Facility: HOSPITAL | Age: 76
End: 2021-08-04
Attending: INTERNAL MEDICINE
Payer: MEDICARE

## 2021-08-04 VITALS
WEIGHT: 140 LBS | SYSTOLIC BLOOD PRESSURE: 143 MMHG | HEIGHT: 62 IN | BODY MASS INDEX: 25.76 KG/M2 | DIASTOLIC BLOOD PRESSURE: 82 MMHG | HEART RATE: 95 BPM

## 2021-08-04 DIAGNOSIS — H10.13 ALLERGIC CONJUNCTIVITIS, BILATERAL: ICD-10-CM

## 2021-08-04 DIAGNOSIS — Z71.89 COUNSELING ON HEALTH PROMOTION AND DISEASE PREVENTION: ICD-10-CM

## 2021-08-04 DIAGNOSIS — H04.123 DRY EYES, BILATERAL: ICD-10-CM

## 2021-08-04 DIAGNOSIS — H40.1134 PRIMARY OPEN ANGLE GLAUCOMA (POAG) OF BOTH EYES, INDETERMINATE STAGE: ICD-10-CM

## 2021-08-04 DIAGNOSIS — M81.0 OSTEOPOROSIS, POSTMENOPAUSAL: ICD-10-CM

## 2021-08-04 DIAGNOSIS — M81.0 OSTEOPOROSIS, POSTMENOPAUSAL: Primary | ICD-10-CM

## 2021-08-04 DIAGNOSIS — M15.9 PRIMARY OSTEOARTHRITIS INVOLVING MULTIPLE JOINTS: ICD-10-CM

## 2021-08-04 LAB
ALBUMIN SERPL BCP-MCNC: 4 G/DL (ref 3.5–5.2)
ALP SERPL-CCNC: 80 U/L (ref 55–135)
ALT SERPL W/O P-5'-P-CCNC: 80 U/L (ref 10–44)
ANION GAP SERPL CALC-SCNC: 9 MMOL/L (ref 8–16)
AST SERPL-CCNC: 76 U/L (ref 10–40)
BILIRUB SERPL-MCNC: 0.4 MG/DL (ref 0.1–1)
BUN SERPL-MCNC: 11 MG/DL (ref 8–23)
CALCIUM SERPL-MCNC: 10 MG/DL (ref 8.7–10.5)
CHLORIDE SERPL-SCNC: 108 MMOL/L (ref 95–110)
CO2 SERPL-SCNC: 26 MMOL/L (ref 23–29)
CREAT SERPL-MCNC: 0.8 MG/DL (ref 0.5–1.4)
EST. GFR  (AFRICAN AMERICAN): >60 ML/MIN/1.73 M^2
EST. GFR  (NON AFRICAN AMERICAN): >60 ML/MIN/1.73 M^2
GLUCOSE SERPL-MCNC: 178 MG/DL (ref 70–110)
POTASSIUM SERPL-SCNC: 4.6 MMOL/L (ref 3.5–5.1)
PROT SERPL-MCNC: 7.4 G/DL (ref 6–8.4)
SODIUM SERPL-SCNC: 143 MMOL/L (ref 136–145)

## 2021-08-04 PROCEDURE — 1101F PT FALLS ASSESS-DOCD LE1/YR: CPT | Mod: CPTII,S$GLB,, | Performed by: INTERNAL MEDICINE

## 2021-08-04 PROCEDURE — 1125F AMNT PAIN NOTED PAIN PRSNT: CPT | Mod: CPTII,S$GLB,, | Performed by: INTERNAL MEDICINE

## 2021-08-04 PROCEDURE — 3079F PR MOST RECENT DIASTOLIC BLOOD PRESSURE 80-89 MM HG: ICD-10-PCS | Mod: CPTII,S$GLB,, | Performed by: INTERNAL MEDICINE

## 2021-08-04 PROCEDURE — 99214 OFFICE O/P EST MOD 30 MIN: CPT | Mod: S$GLB,,, | Performed by: INTERNAL MEDICINE

## 2021-08-04 PROCEDURE — 99999 PR PBB SHADOW E&M-EST. PATIENT-LVL V: CPT | Mod: PBBFAC,,, | Performed by: INTERNAL MEDICINE

## 2021-08-04 PROCEDURE — 1101F PR PT FALLS ASSESS DOC 0-1 FALLS W/OUT INJ PAST YR: ICD-10-PCS | Mod: CPTII,S$GLB,, | Performed by: INTERNAL MEDICINE

## 2021-08-04 PROCEDURE — 99999 PR PBB SHADOW E&M-EST. PATIENT-LVL V: ICD-10-PCS | Mod: PBBFAC,,, | Performed by: INTERNAL MEDICINE

## 2021-08-04 PROCEDURE — 99499 RISK ADDL DX/OHS AUDIT: ICD-10-PCS | Mod: S$GLB,,, | Performed by: INTERNAL MEDICINE

## 2021-08-04 PROCEDURE — 1160F RVW MEDS BY RX/DR IN RCRD: CPT | Mod: CPTII,S$GLB,, | Performed by: INTERNAL MEDICINE

## 2021-08-04 PROCEDURE — 1160F PR REVIEW ALL MEDS BY PRESCRIBER/CLIN PHARMACIST DOCUMENTED: ICD-10-PCS | Mod: CPTII,S$GLB,, | Performed by: INTERNAL MEDICINE

## 2021-08-04 PROCEDURE — 3288F FALL RISK ASSESSMENT DOCD: CPT | Mod: CPTII,S$GLB,, | Performed by: INTERNAL MEDICINE

## 2021-08-04 PROCEDURE — 1159F PR MEDICATION LIST DOCUMENTED IN MEDICAL RECORD: ICD-10-PCS | Mod: CPTII,S$GLB,, | Performed by: INTERNAL MEDICINE

## 2021-08-04 PROCEDURE — 1159F MED LIST DOCD IN RCRD: CPT | Mod: CPTII,S$GLB,, | Performed by: INTERNAL MEDICINE

## 2021-08-04 PROCEDURE — 80053 COMPREHEN METABOLIC PANEL: CPT | Performed by: INTERNAL MEDICINE

## 2021-08-04 PROCEDURE — 1125F PR PAIN SEVERITY QUANTIFIED, PAIN PRESENT: ICD-10-PCS | Mod: CPTII,S$GLB,, | Performed by: INTERNAL MEDICINE

## 2021-08-04 PROCEDURE — 99214 PR OFFICE/OUTPT VISIT, EST, LEVL IV, 30-39 MIN: ICD-10-PCS | Mod: S$GLB,,, | Performed by: INTERNAL MEDICINE

## 2021-08-04 PROCEDURE — 3288F PR FALLS RISK ASSESSMENT DOCUMENTED: ICD-10-PCS | Mod: CPTII,S$GLB,, | Performed by: INTERNAL MEDICINE

## 2021-08-04 PROCEDURE — 36415 COLL VENOUS BLD VENIPUNCTURE: CPT | Performed by: INTERNAL MEDICINE

## 2021-08-04 PROCEDURE — 3079F DIAST BP 80-89 MM HG: CPT | Mod: CPTII,S$GLB,, | Performed by: INTERNAL MEDICINE

## 2021-08-04 PROCEDURE — 3077F PR MOST RECENT SYSTOLIC BLOOD PRESSURE >= 140 MM HG: ICD-10-PCS | Mod: CPTII,S$GLB,, | Performed by: INTERNAL MEDICINE

## 2021-08-04 PROCEDURE — 99499 UNLISTED E&M SERVICE: CPT | Mod: S$GLB,,, | Performed by: INTERNAL MEDICINE

## 2021-08-04 PROCEDURE — 3077F SYST BP >= 140 MM HG: CPT | Mod: CPTII,S$GLB,, | Performed by: INTERNAL MEDICINE

## 2021-08-04 RX ORDER — CYCLOSPORINE 0.5 MG/ML
1 EMULSION OPHTHALMIC 2 TIMES DAILY
Qty: 180 VIAL | Refills: 2 | Status: SHIPPED | OUTPATIENT
Start: 2021-08-04 | End: 2023-04-28

## 2021-08-04 RX ORDER — CYCLOBENZAPRINE HCL 5 MG
5 TABLET ORAL NIGHTLY
Qty: 30 TABLET | Refills: 0 | Status: SHIPPED | OUTPATIENT
Start: 2021-08-04 | End: 2021-08-14

## 2021-08-04 RX ORDER — LATANOPROST 50 UG/ML
1 SOLUTION/ DROPS OPHTHALMIC NIGHTLY
Qty: 2.5 ML | Refills: 1 | Status: SHIPPED | OUTPATIENT
Start: 2021-08-04 | End: 2023-01-11 | Stop reason: SDUPTHER

## 2021-08-11 ENCOUNTER — INFUSION (OUTPATIENT)
Dept: INFUSION THERAPY | Facility: HOSPITAL | Age: 76
End: 2021-08-11
Attending: INTERNAL MEDICINE
Payer: MEDICARE

## 2021-08-11 VITALS
OXYGEN SATURATION: 98 % | HEART RATE: 72 BPM | DIASTOLIC BLOOD PRESSURE: 72 MMHG | RESPIRATION RATE: 16 BRPM | TEMPERATURE: 98 F | SYSTOLIC BLOOD PRESSURE: 144 MMHG

## 2021-08-11 DIAGNOSIS — M81.0 OSTEOPOROSIS, POSTMENOPAUSAL: Primary | ICD-10-CM

## 2021-08-11 PROCEDURE — 96372 THER/PROPH/DIAG INJ SC/IM: CPT

## 2021-08-11 PROCEDURE — 63600175 PHARM REV CODE 636 W HCPCS: Mod: JG | Performed by: INTERNAL MEDICINE

## 2021-08-11 RX ADMIN — DENOSUMAB 60 MG: 60 INJECTION SUBCUTANEOUS at 08:08

## 2021-12-16 ENCOUNTER — PATIENT MESSAGE (OUTPATIENT)
Dept: ADMINISTRATIVE | Facility: HOSPITAL | Age: 76
End: 2021-12-16
Payer: MEDICARE

## 2022-01-31 ENCOUNTER — TELEPHONE (OUTPATIENT)
Dept: RHEUMATOLOGY | Facility: CLINIC | Age: 77
End: 2022-01-31
Payer: MEDICARE

## 2022-01-31 DIAGNOSIS — M81.0 OSTEOPOROSIS, POSTMENOPAUSAL: Primary | ICD-10-CM

## 2022-01-31 NOTE — TELEPHONE ENCOUNTER
Spoke with Dorcas. Patient scheduled for ;abs 2-3. Prolia rescheduled for 2-7 at [facility]. Follow up with Dr. Briggs-Himanshu and Prolia scheduled for 8-17

## 2022-01-31 NOTE — TELEPHONE ENCOUNTER
----- Message from Maya Mtz sent at 1/31/2022 11:18 AM CST -----  Pt's daughter marc Toscano like return call; would like to reschedule pt's follow-up appt, labs and injection to be on a Monday.   Please call back at [phone],  Md John

## 2022-02-03 ENCOUNTER — OFFICE VISIT (OUTPATIENT)
Dept: INTERNAL MEDICINE | Facility: CLINIC | Age: 77
End: 2022-02-03
Payer: MEDICARE

## 2022-02-03 ENCOUNTER — PATIENT MESSAGE (OUTPATIENT)
Dept: INTERNAL MEDICINE | Facility: CLINIC | Age: 77
End: 2022-02-03

## 2022-02-03 ENCOUNTER — LAB VISIT (OUTPATIENT)
Dept: LAB | Facility: HOSPITAL | Age: 77
End: 2022-02-03
Attending: INTERNAL MEDICINE
Payer: MEDICARE

## 2022-02-03 VITALS
TEMPERATURE: 97 F | WEIGHT: 151.25 LBS | HEART RATE: 75 BPM | BODY MASS INDEX: 27.83 KG/M2 | HEIGHT: 62 IN | SYSTOLIC BLOOD PRESSURE: 135 MMHG | DIASTOLIC BLOOD PRESSURE: 78 MMHG

## 2022-02-03 DIAGNOSIS — M81.0 OSTEOPOROSIS, POSTMENOPAUSAL: ICD-10-CM

## 2022-02-03 DIAGNOSIS — G51.0 BELL'S PALSY: Primary | ICD-10-CM

## 2022-02-03 LAB
ALBUMIN SERPL BCP-MCNC: 3.7 G/DL (ref 3.5–5.2)
ALP SERPL-CCNC: 58 U/L (ref 55–135)
ALT SERPL W/O P-5'-P-CCNC: 35 U/L (ref 10–44)
ANION GAP SERPL CALC-SCNC: 10 MMOL/L (ref 8–16)
AST SERPL-CCNC: 31 U/L (ref 10–40)
BILIRUB SERPL-MCNC: 0.3 MG/DL (ref 0.1–1)
BUN SERPL-MCNC: 10 MG/DL (ref 8–23)
CALCIUM SERPL-MCNC: 9.5 MG/DL (ref 8.7–10.5)
CHLORIDE SERPL-SCNC: 105 MMOL/L (ref 95–110)
CO2 SERPL-SCNC: 25 MMOL/L (ref 23–29)
CREAT SERPL-MCNC: 0.9 MG/DL (ref 0.5–1.4)
EST. GFR  (AFRICAN AMERICAN): >60 ML/MIN/1.73 M^2
EST. GFR  (NON AFRICAN AMERICAN): >60 ML/MIN/1.73 M^2
GLUCOSE SERPL-MCNC: 124 MG/DL (ref 70–110)
POTASSIUM SERPL-SCNC: 4.4 MMOL/L (ref 3.5–5.1)
PROT SERPL-MCNC: 7.2 G/DL (ref 6–8.4)
SODIUM SERPL-SCNC: 140 MMOL/L (ref 136–145)

## 2022-02-03 PROCEDURE — 3075F PR MOST RECENT SYSTOLIC BLOOD PRESS GE 130-139MM HG: ICD-10-PCS | Mod: HCNC,CPTII,S$GLB, | Performed by: INTERNAL MEDICINE

## 2022-02-03 PROCEDURE — 99999 PR PBB SHADOW E&M-EST. PATIENT-LVL V: ICD-10-PCS | Mod: PBBFAC,HCNC,, | Performed by: INTERNAL MEDICINE

## 2022-02-03 PROCEDURE — 1101F PR PT FALLS ASSESS DOC 0-1 FALLS W/OUT INJ PAST YR: ICD-10-PCS | Mod: HCNC,CPTII,S$GLB, | Performed by: INTERNAL MEDICINE

## 2022-02-03 PROCEDURE — 99999 PR PBB SHADOW E&M-EST. PATIENT-LVL V: CPT | Mod: PBBFAC,HCNC,, | Performed by: INTERNAL MEDICINE

## 2022-02-03 PROCEDURE — 3078F PR MOST RECENT DIASTOLIC BLOOD PRESSURE < 80 MM HG: ICD-10-PCS | Mod: HCNC,CPTII,S$GLB, | Performed by: INTERNAL MEDICINE

## 2022-02-03 PROCEDURE — 1160F PR REVIEW ALL MEDS BY PRESCRIBER/CLIN PHARMACIST DOCUMENTED: ICD-10-PCS | Mod: HCNC,CPTII,S$GLB, | Performed by: INTERNAL MEDICINE

## 2022-02-03 PROCEDURE — 1126F PR PAIN SEVERITY QUANTIFIED, NO PAIN PRESENT: ICD-10-PCS | Mod: HCNC,CPTII,S$GLB, | Performed by: INTERNAL MEDICINE

## 2022-02-03 PROCEDURE — 80053 COMPREHEN METABOLIC PANEL: CPT | Mod: HCNC | Performed by: INTERNAL MEDICINE

## 2022-02-03 PROCEDURE — 1101F PT FALLS ASSESS-DOCD LE1/YR: CPT | Mod: HCNC,CPTII,S$GLB, | Performed by: INTERNAL MEDICINE

## 2022-02-03 PROCEDURE — 3288F FALL RISK ASSESSMENT DOCD: CPT | Mod: HCNC,CPTII,S$GLB, | Performed by: INTERNAL MEDICINE

## 2022-02-03 PROCEDURE — 3075F SYST BP GE 130 - 139MM HG: CPT | Mod: HCNC,CPTII,S$GLB, | Performed by: INTERNAL MEDICINE

## 2022-02-03 PROCEDURE — 99213 OFFICE O/P EST LOW 20 MIN: CPT | Mod: HCNC,S$GLB,, | Performed by: INTERNAL MEDICINE

## 2022-02-03 PROCEDURE — 3288F PR FALLS RISK ASSESSMENT DOCUMENTED: ICD-10-PCS | Mod: HCNC,CPTII,S$GLB, | Performed by: INTERNAL MEDICINE

## 2022-02-03 PROCEDURE — 1126F AMNT PAIN NOTED NONE PRSNT: CPT | Mod: HCNC,CPTII,S$GLB, | Performed by: INTERNAL MEDICINE

## 2022-02-03 PROCEDURE — 3072F LOW RISK FOR RETINOPATHY: CPT | Mod: HCNC,CPTII,S$GLB, | Performed by: INTERNAL MEDICINE

## 2022-02-03 PROCEDURE — 1160F RVW MEDS BY RX/DR IN RCRD: CPT | Mod: HCNC,CPTII,S$GLB, | Performed by: INTERNAL MEDICINE

## 2022-02-03 PROCEDURE — 3078F DIAST BP <80 MM HG: CPT | Mod: HCNC,CPTII,S$GLB, | Performed by: INTERNAL MEDICINE

## 2022-02-03 PROCEDURE — 1159F MED LIST DOCD IN RCRD: CPT | Mod: HCNC,CPTII,S$GLB, | Performed by: INTERNAL MEDICINE

## 2022-02-03 PROCEDURE — 36415 COLL VENOUS BLD VENIPUNCTURE: CPT | Mod: HCNC,PO | Performed by: INTERNAL MEDICINE

## 2022-02-03 PROCEDURE — 99213 PR OFFICE/OUTPT VISIT, EST, LEVL III, 20-29 MIN: ICD-10-PCS | Mod: HCNC,S$GLB,, | Performed by: INTERNAL MEDICINE

## 2022-02-03 PROCEDURE — 1159F PR MEDICATION LIST DOCUMENTED IN MEDICAL RECORD: ICD-10-PCS | Mod: HCNC,CPTII,S$GLB, | Performed by: INTERNAL MEDICINE

## 2022-02-03 PROCEDURE — 3072F PR LOW RISK FOR RETINOPATHY: ICD-10-PCS | Mod: HCNC,CPTII,S$GLB, | Performed by: INTERNAL MEDICINE

## 2022-02-03 RX ORDER — MINERAL OIL, PETROLATUM 425; 573 MG/G; MG/G
OINTMENT OPHTHALMIC NIGHTLY
Qty: 3.5 G | Refills: 2 | Status: SHIPPED | OUTPATIENT
Start: 2022-02-03

## 2022-02-03 NOTE — PROGRESS NOTES
"Subjective:       Patient ID: Brianne Martin is a 77 y.o. female.    Chief Complaint: Follow-up    HPI Patient is a 77-year-old female presenting today following up on Bell's palsy.  She states that roughly 5 6 days ago she woke up and had drooping of the left side of her face and inability to close left eye.  She went to an urgent care and was evaluated in the confirmed diagnosis of Bell's palsy.  They gave her a course of steroids and some ophthalmic ointment to use at night.  They instructed her to tape the eye shut it at night as well.  She indicates she is doing well at this time.  She is continuing to demonstrated left-sided facial droop and inability to close the left eye but she is having no other problems.  We talked about the importance of keeping the eye moist and lubricated.  We talked about red flag symptoms around the eye that would be pertinent.  She has no other neurologic symptoms.    Review of Systems    Objective:   /78   Pulse 75   Temp 97.3 °F (36.3 °C) (Temporal)   Ht 5' 2" (1.575 m)   Wt 68.6 kg (151 lb 3.8 oz)   BMI 27.66 kg/m²      Physical Exam  Vitals reviewed.   Constitutional:       General: She is not in acute distress.     Appearance: Normal appearance. She is well-developed. She is not ill-appearing.   HENT:      Head: Normocephalic and atraumatic.      Right Ear: External ear normal.      Left Ear: External ear normal.   Eyes:      Comments: Patient has inability look fully close the left eye.  Sclera is noninjected.  Conjunctival is clear.   Cardiovascular:      Rate and Rhythm: Normal rate and regular rhythm.      Heart sounds: No murmur heard.  No friction rub. No gallop.    Pulmonary:      Effort: Pulmonary effort is normal.      Breath sounds: Normal breath sounds. No wheezing or rales.   Chest:      Chest wall: No tenderness.   Abdominal:      General: Abdomen is flat. Bowel sounds are normal. There is no distension.      Palpations: Abdomen is soft.      Tenderness: " There is no abdominal tenderness.   Lymphadenopathy:      Cervical: No cervical adenopathy.   Skin:     Findings: No rash.   Neurological:      General: No focal deficit present.      Mental Status: She is alert and oriented to person, place, and time.      Comments: Patient is typical Bell's palsy findings.  Left-sided facial droop.  Inability to close left eye.  No other neurologic deficits.         No visits with results within 2 Week(s) from this visit.   Latest known visit with results is:   Lab Visit on 08/04/2021   Component Date Value    Sodium 08/04/2021 143     Potassium 08/04/2021 4.6     Chloride 08/04/2021 108     CO2 08/04/2021 26     Glucose 08/04/2021 178*    BUN 08/04/2021 11     Creatinine 08/04/2021 0.8     Calcium 08/04/2021 10.0     Total Protein 08/04/2021 7.4     Albumin 08/04/2021 4.0     Total Bilirubin 08/04/2021 0.4     Alkaline Phosphatase 08/04/2021 80     AST 08/04/2021 76*    ALT 08/04/2021 80*    Anion Gap 08/04/2021 9     eGFR if African American 08/04/2021 >60     eGFR if non African Amer* 08/04/2021 >60        Assessment:       1. Bell's palsy        Plan:   No problem-specific Assessment & Plan notes found for this encounter.    Bell's palsy  -     white petrolatum-mineral oil 57.3-42.5% (REFRESH P.M.) 57.3-42.5 % Oint; Place into the left eye every evening.  Dispense: 3.5 g; Refill: 2    Patient continue to provide care to the eye.  We had explained the natural course of bills palsy.  I do not think antiviral agents are beneficial at this point.  She has had a some course of steroids already.  If she develops any issues with the eye she will let us know immediately.  If she develops new neurologic symptoms she will seek evaluation.      Follow up if symptoms worsen or fail to improve.

## 2022-02-03 NOTE — TELEPHONE ENCOUNTER
Patient was here today. This is the rest of her medication list. Do you want it to be added in her chart?

## 2022-02-07 ENCOUNTER — INFUSION (OUTPATIENT)
Dept: INFUSION THERAPY | Facility: HOSPITAL | Age: 77
End: 2022-02-07
Attending: INTERNAL MEDICINE
Payer: MEDICARE

## 2022-02-07 VITALS
TEMPERATURE: 97 F | RESPIRATION RATE: 16 BRPM | DIASTOLIC BLOOD PRESSURE: 78 MMHG | SYSTOLIC BLOOD PRESSURE: 135 MMHG | HEART RATE: 75 BPM

## 2022-02-07 DIAGNOSIS — M81.0 OSTEOPOROSIS, POSTMENOPAUSAL: Primary | ICD-10-CM

## 2022-02-07 PROCEDURE — 63600175 PHARM REV CODE 636 W HCPCS: Mod: JG,HCNC | Performed by: INTERNAL MEDICINE

## 2022-02-07 PROCEDURE — 96372 THER/PROPH/DIAG INJ SC/IM: CPT | Mod: HCNC

## 2022-02-07 RX ADMIN — DENOSUMAB 60 MG: 60 INJECTION SUBCUTANEOUS at 10:02

## 2022-03-17 ENCOUNTER — PATIENT MESSAGE (OUTPATIENT)
Dept: ADMINISTRATIVE | Facility: HOSPITAL | Age: 77
End: 2022-03-17
Payer: MEDICARE

## 2022-06-02 ENCOUNTER — OFFICE VISIT (OUTPATIENT)
Dept: INTERNAL MEDICINE | Facility: CLINIC | Age: 77
End: 2022-06-02
Payer: MEDICARE

## 2022-06-02 ENCOUNTER — LAB VISIT (OUTPATIENT)
Dept: LAB | Facility: HOSPITAL | Age: 77
End: 2022-06-02
Attending: NURSE PRACTITIONER
Payer: MEDICARE

## 2022-06-02 VITALS
BODY MASS INDEX: 27.43 KG/M2 | HEART RATE: 91 BPM | DIASTOLIC BLOOD PRESSURE: 82 MMHG | WEIGHT: 149.06 LBS | HEIGHT: 62 IN | TEMPERATURE: 98 F | SYSTOLIC BLOOD PRESSURE: 124 MMHG | OXYGEN SATURATION: 96 %

## 2022-06-02 DIAGNOSIS — E11.69 HYPERLIPIDEMIA ASSOCIATED WITH TYPE 2 DIABETES MELLITUS: ICD-10-CM

## 2022-06-02 DIAGNOSIS — N39.0 URINARY TRACT INFECTION WITH HEMATURIA, SITE UNSPECIFIED: Primary | ICD-10-CM

## 2022-06-02 DIAGNOSIS — Z12.11 ENCOUNTER FOR SCREENING COLONOSCOPY: ICD-10-CM

## 2022-06-02 DIAGNOSIS — E11.59 HYPERTENSION ASSOCIATED WITH DIABETES: ICD-10-CM

## 2022-06-02 DIAGNOSIS — E11.9 TYPE 2 DIABETES MELLITUS WITHOUT COMPLICATION, WITHOUT LONG-TERM CURRENT USE OF INSULIN: ICD-10-CM

## 2022-06-02 DIAGNOSIS — R31.9 URINARY TRACT INFECTION WITH HEMATURIA, SITE UNSPECIFIED: Primary | ICD-10-CM

## 2022-06-02 DIAGNOSIS — I15.2 HYPERTENSION ASSOCIATED WITH DIABETES: ICD-10-CM

## 2022-06-02 DIAGNOSIS — E78.5 HYPERLIPIDEMIA ASSOCIATED WITH TYPE 2 DIABETES MELLITUS: ICD-10-CM

## 2022-06-02 LAB
ALBUMIN/CREAT UR: 22.2 UG/MG (ref 0–30)
BILIRUB SERPL-MCNC: POSITIVE MG/DL
BLOOD URINE, POC: POSITIVE
CHOLEST SERPL-MCNC: 164 MG/DL (ref 120–199)
CHOLEST/HDLC SERPL: 3.6 {RATIO} (ref 2–5)
COLOR, POC UA: YELLOW
CREAT UR-MCNC: 99 MG/DL (ref 15–325)
ESTIMATED AVG GLUCOSE: 114 MG/DL (ref 68–131)
GLUCOSE UR QL STRIP: NORMAL
HBA1C MFR BLD: 5.6 % (ref 4–5.6)
HDLC SERPL-MCNC: 45 MG/DL (ref 40–75)
HDLC SERPL: 27.4 % (ref 20–50)
KETONES UR QL STRIP: NEGATIVE
LDLC SERPL CALC-MCNC: 94.8 MG/DL (ref 63–159)
LEUKOCYTE ESTERASE URINE, POC: POSITIVE
MICROALBUMIN UR DL<=1MG/L-MCNC: 22 UG/ML
NITRITE, POC UA: POSITIVE
NONHDLC SERPL-MCNC: 119 MG/DL
PH, POC UA: 5
PROTEIN, POC: POSITIVE
SPECIFIC GRAVITY, POC UA: 1.01
TRIGL SERPL-MCNC: 121 MG/DL (ref 30–150)
UROBILINOGEN, POC UA: NORMAL

## 2022-06-02 PROCEDURE — 83036 HEMOGLOBIN GLYCOSYLATED A1C: CPT | Performed by: NURSE PRACTITIONER

## 2022-06-02 PROCEDURE — 3288F FALL RISK ASSESSMENT DOCD: CPT | Mod: CPTII,S$GLB,, | Performed by: NURSE PRACTITIONER

## 2022-06-02 PROCEDURE — 81001 URINALYSIS AUTO W/SCOPE: CPT | Mod: S$GLB,,, | Performed by: NURSE PRACTITIONER

## 2022-06-02 PROCEDURE — 82570 ASSAY OF URINE CREATININE: CPT | Performed by: NURSE PRACTITIONER

## 2022-06-02 PROCEDURE — 80061 LIPID PANEL: CPT | Performed by: NURSE PRACTITIONER

## 2022-06-02 PROCEDURE — 3074F PR MOST RECENT SYSTOLIC BLOOD PRESSURE < 130 MM HG: ICD-10-PCS | Mod: CPTII,S$GLB,, | Performed by: NURSE PRACTITIONER

## 2022-06-02 PROCEDURE — 1159F MED LIST DOCD IN RCRD: CPT | Mod: CPTII,S$GLB,, | Performed by: NURSE PRACTITIONER

## 2022-06-02 PROCEDURE — 87088 URINE BACTERIA CULTURE: CPT | Performed by: NURSE PRACTITIONER

## 2022-06-02 PROCEDURE — 99999 PR PBB SHADOW E&M-EST. PATIENT-LVL IV: CPT | Mod: PBBFAC,,, | Performed by: NURSE PRACTITIONER

## 2022-06-02 PROCEDURE — 3079F DIAST BP 80-89 MM HG: CPT | Mod: CPTII,S$GLB,, | Performed by: NURSE PRACTITIONER

## 2022-06-02 PROCEDURE — 82043 UR ALBUMIN QUANTITATIVE: CPT | Performed by: NURSE PRACTITIONER

## 2022-06-02 PROCEDURE — 1159F PR MEDICATION LIST DOCUMENTED IN MEDICAL RECORD: ICD-10-PCS | Mod: CPTII,S$GLB,, | Performed by: NURSE PRACTITIONER

## 2022-06-02 PROCEDURE — 87077 CULTURE AEROBIC IDENTIFY: CPT | Performed by: NURSE PRACTITIONER

## 2022-06-02 PROCEDURE — 3072F PR LOW RISK FOR RETINOPATHY: ICD-10-PCS | Mod: CPTII,S$GLB,, | Performed by: NURSE PRACTITIONER

## 2022-06-02 PROCEDURE — 99214 OFFICE O/P EST MOD 30 MIN: CPT | Mod: S$GLB,,, | Performed by: NURSE PRACTITIONER

## 2022-06-02 PROCEDURE — 99999 PR PBB SHADOW E&M-EST. PATIENT-LVL IV: ICD-10-PCS | Mod: PBBFAC,,, | Performed by: NURSE PRACTITIONER

## 2022-06-02 PROCEDURE — 3072F LOW RISK FOR RETINOPATHY: CPT | Mod: CPTII,S$GLB,, | Performed by: NURSE PRACTITIONER

## 2022-06-02 PROCEDURE — 3288F PR FALLS RISK ASSESSMENT DOCUMENTED: ICD-10-PCS | Mod: CPTII,S$GLB,, | Performed by: NURSE PRACTITIONER

## 2022-06-02 PROCEDURE — 3079F PR MOST RECENT DIASTOLIC BLOOD PRESSURE 80-89 MM HG: ICD-10-PCS | Mod: CPTII,S$GLB,, | Performed by: NURSE PRACTITIONER

## 2022-06-02 PROCEDURE — 3074F SYST BP LT 130 MM HG: CPT | Mod: CPTII,S$GLB,, | Performed by: NURSE PRACTITIONER

## 2022-06-02 PROCEDURE — 1160F RVW MEDS BY RX/DR IN RCRD: CPT | Mod: CPTII,S$GLB,, | Performed by: NURSE PRACTITIONER

## 2022-06-02 PROCEDURE — 1101F PT FALLS ASSESS-DOCD LE1/YR: CPT | Mod: CPTII,S$GLB,, | Performed by: NURSE PRACTITIONER

## 2022-06-02 PROCEDURE — 87186 SC STD MICRODIL/AGAR DIL: CPT | Performed by: NURSE PRACTITIONER

## 2022-06-02 PROCEDURE — 87086 URINE CULTURE/COLONY COUNT: CPT | Performed by: NURSE PRACTITIONER

## 2022-06-02 PROCEDURE — 1126F PR PAIN SEVERITY QUANTIFIED, NO PAIN PRESENT: ICD-10-PCS | Mod: CPTII,S$GLB,, | Performed by: NURSE PRACTITIONER

## 2022-06-02 PROCEDURE — 81001 POCT URINALYSIS, DIPSTICK OR TABLET REAGENT, AUTOMATED, WITH MICROSCOP: ICD-10-PCS | Mod: S$GLB,,, | Performed by: NURSE PRACTITIONER

## 2022-06-02 PROCEDURE — 1126F AMNT PAIN NOTED NONE PRSNT: CPT | Mod: CPTII,S$GLB,, | Performed by: NURSE PRACTITIONER

## 2022-06-02 PROCEDURE — 1101F PR PT FALLS ASSESS DOC 0-1 FALLS W/OUT INJ PAST YR: ICD-10-PCS | Mod: CPTII,S$GLB,, | Performed by: NURSE PRACTITIONER

## 2022-06-02 PROCEDURE — 1160F PR REVIEW ALL MEDS BY PRESCRIBER/CLIN PHARMACIST DOCUMENTED: ICD-10-PCS | Mod: CPTII,S$GLB,, | Performed by: NURSE PRACTITIONER

## 2022-06-02 PROCEDURE — 36415 COLL VENOUS BLD VENIPUNCTURE: CPT | Mod: PO | Performed by: NURSE PRACTITIONER

## 2022-06-02 PROCEDURE — 99214 PR OFFICE/OUTPT VISIT, EST, LEVL IV, 30-39 MIN: ICD-10-PCS | Mod: S$GLB,,, | Performed by: NURSE PRACTITIONER

## 2022-06-02 RX ORDER — MELOXICAM 7.5 MG/1
TABLET ORAL
COMMUNITY
Start: 2022-02-21

## 2022-06-02 RX ORDER — FLUCONAZOLE 150 MG/1
TABLET ORAL
COMMUNITY
Start: 2022-04-11 | End: 2023-04-10 | Stop reason: ALTCHOICE

## 2022-06-02 RX ORDER — LOSARTAN POTASSIUM 100 MG/1
TABLET ORAL
COMMUNITY
Start: 2022-02-21 | End: 2022-08-30 | Stop reason: SDUPTHER

## 2022-06-02 RX ORDER — VALACYCLOVIR HYDROCHLORIDE 1 G/1
1000 TABLET, FILM COATED ORAL 3 TIMES DAILY
COMMUNITY
Start: 2022-01-20 | End: 2024-04-03

## 2022-06-02 RX ORDER — ALENDRONATE SODIUM 70 MG/1
TABLET ORAL
COMMUNITY
Start: 2022-02-21 | End: 2023-08-22

## 2022-06-02 RX ORDER — SULFAMETHOXAZOLE AND TRIMETHOPRIM 800; 160 MG/1; MG/1
1 TABLET ORAL 2 TIMES DAILY
Qty: 14 TABLET | Refills: 0 | Status: SHIPPED | OUTPATIENT
Start: 2022-06-02 | End: 2022-06-09

## 2022-06-02 RX ORDER — PROMETHAZINE HYDROCHLORIDE AND DEXTROMETHORPHAN HYDROBROMIDE 6.25; 15 MG/5ML; MG/5ML
5 SYRUP ORAL EVERY 6 HOURS PRN
COMMUNITY
Start: 2021-12-11 | End: 2022-11-14

## 2022-06-02 RX ORDER — ERGOCALCIFEROL 1.25 MG/1
CAPSULE ORAL
COMMUNITY
Start: 2022-02-21

## 2022-06-02 NOTE — PROGRESS NOTES
"Subjective:       Patient ID: Brianne Martin is a 77 y.o. female.    Chief Complaint: Urinary Tract Infection (Pt c/o vaginal odor, discharge, and burning while urinating. Symptoms have been going on for over a month.)    Pt presents to clinic today with her daughter for burning with urination. Pt reports she had this about 1 month ago and went to urgent care and was treated for yeast. Pt reports the symptoms got better but have now returned. Reports clear/white vaginal discharge.    Pt is an insulin dependent diabetic. Reports glucose ranging from 90-120s at home. Pt takes metformin,  20 units of novolog TID and trulicity. Pt reports she eats a well balanced diet. No low glucoses to note. Due for eye exam. Pt to schedule.    Pt BP is well control on current meds of valsartan. Denies side effects of meds      /82   Pulse 91   Temp 97.7 °F (36.5 °C) (Temporal)   Ht 5' 2" (1.575 m)   Wt 67.6 kg (149 lb 0.5 oz)   SpO2 96%   BMI 27.26 kg/m²     Review of Systems   Constitutional: Negative for appetite change, chills, diaphoresis, fatigue and fever.   HENT: Negative.    Eyes: Negative for visual disturbance.   Respiratory: Negative for cough, shortness of breath and wheezing.    Cardiovascular: Negative for chest pain, palpitations and leg swelling.   Gastrointestinal: Negative for abdominal distention, abdominal pain, blood in stool, constipation, diarrhea, nausea and vomiting.   Genitourinary: Positive for dysuria, frequency, urgency and vaginal discharge. Negative for decreased urine volume, difficulty urinating and hematuria.   Neurological: Negative.  Negative for dizziness, syncope, speech difficulty, light-headedness and headaches.   Psychiatric/Behavioral: Negative for agitation, confusion and hallucinations. The patient is not nervous/anxious.        Objective:      Physical Exam  Vitals and nursing note reviewed.   Constitutional:       General: She is not in acute distress.     Appearance: She is " well-developed. She is not diaphoretic.   HENT:      Head: Normocephalic and atraumatic.      Mouth/Throat:      Pharynx: No oropharyngeal exudate.   Eyes:      General:         Right eye: No discharge.         Left eye: No discharge.      Conjunctiva/sclera: Conjunctivae normal.   Cardiovascular:      Rate and Rhythm: Normal rate and regular rhythm.      Pulses:           Dorsalis pedis pulses are 1+ on the right side and 1+ on the left side.        Posterior tibial pulses are 1+ on the right side and 1+ on the left side.      Heart sounds: Normal heart sounds. No murmur heard.  Pulmonary:      Effort: Pulmonary effort is normal. No respiratory distress.      Breath sounds: Normal breath sounds. No wheezing.   Abdominal:      General: There is no distension.      Palpations: Abdomen is soft.   Musculoskeletal:         General: No tenderness. Normal range of motion.      Right foot: Normal range of motion. No deformity.      Left foot: Normal range of motion. No deformity.   Feet:      Right foot:      Protective Sensation: 5 sites tested. 5 sites sensed.      Skin integrity: No skin breakdown or callus.      Toenail Condition: Right toenails are normal.      Left foot:      Protective Sensation: 5 sites tested. 5 sites sensed.      Skin integrity: No skin breakdown or callus.      Toenail Condition: Left toenails are normal.   Skin:     General: Skin is warm and dry.      Findings: No erythema or rash.   Neurological:      Mental Status: She is alert and oriented to person, place, and time.   Psychiatric:         Behavior: Behavior normal.         Thought Content: Thought content normal.         Judgment: Judgment normal.         Assessment:       1. Urinary tract infection with hematuria, site unspecified    2. Type 2 diabetes mellitus without complication, without long-term current use of insulin    3. Hypertension associated with diabetes    4. Hyperlipidemia associated with type 2 diabetes mellitus    5.  Encounter for screening colonoscopy        Plan:       Brianne was seen today for urinary tract infection.    Diagnoses and all orders for this visit:    Urinary tract infection with hematuria, site unspecified  -     POCT URINE DIPSTICK WITH MICROSCOPE, AUTOMATED  -     sulfamethoxazole-trimethoprim 800-160mg (BACTRIM DS) 800-160 mg Tab; Take 1 tablet by mouth 2 (two) times daily. for 7 days  -     Urine culture  - Increase water, void often, follow up if no improvement.     Type 2 diabetes mellitus without complication, without long-term current use of insulin  -     Microalbumin/Creatinine Ratio, Urine  -     Hemoglobin A1C; Future  - Continue meds as prescribed. Follow up 3 months    Hypertension associated with diabetes       - Stable, Continue losartan    Hyperlipidemia associated with type 2 diabetes mellitus  -     Lipid Panel; Future  - Stable-continue lipitor    Encounter for screening colonoscopy  -     Ambulatory referral/consult to Endo Procedure ; Future      There are no Patient Instructions on file for this visit.

## 2022-06-05 LAB — BACTERIA UR CULT: ABNORMAL

## 2022-06-11 DIAGNOSIS — K21.9 GASTROESOPHAGEAL REFLUX DISEASE WITHOUT ESOPHAGITIS: ICD-10-CM

## 2022-06-11 RX ORDER — SIMVASTATIN 40 MG/1
TABLET, FILM COATED ORAL
Qty: 90 TABLET | Refills: 2 | Status: SHIPPED | OUTPATIENT
Start: 2022-06-11 | End: 2022-08-30 | Stop reason: SDUPTHER

## 2022-06-11 RX ORDER — VENLAFAXINE 75 MG/1
TABLET ORAL
Qty: 180 TABLET | Refills: 2 | Status: SHIPPED | OUTPATIENT
Start: 2022-06-11 | End: 2023-03-27

## 2022-06-11 NOTE — TELEPHONE ENCOUNTER
No new care gaps identified.  [facility] Embedded Care Gaps. Reference number: [id number]. 6/11/2022   2:37:50 AM CDT

## 2022-06-11 NOTE — TELEPHONE ENCOUNTER
Refill Routing Note   Medication(s) are not appropriate for processing by [facility] for the following reason(s):      - Drug-Disease Interaction (Osteoporosis)    [facility] action(s):  Defer  Approve Medication-related problems identified: Drug-disease interaction     Medication Therapy Plan: Defer prilosec - drug disease interaction. Approve effecor and zocor.  Medication reconciliation completed: No     Appointments  past 12m or future 3m with PCP    Date Provider   Last Visit   2/3/2022 Hardy Tran MD   Next Visit   Visit date not found Hardy Tran MD   ED visits in past 90 days: 0        Note composed:3:48 PM 06/11/2022

## 2022-06-12 RX ORDER — OMEPRAZOLE 40 MG/1
CAPSULE, DELAYED RELEASE ORAL
Qty: 90 CAPSULE | Refills: 2 | Status: SHIPPED | OUTPATIENT
Start: 2022-06-12 | End: 2022-08-30 | Stop reason: SDUPTHER

## 2022-06-13 RX ORDER — VALSARTAN 320 MG/1
TABLET ORAL
Qty: 90 TABLET | OUTPATIENT
Start: 2022-06-13

## 2022-06-13 NOTE — TELEPHONE ENCOUNTER
Quick DC. Inappropriate Request    Refill Authorization Note   Brianne Martin  is requesting a refill authorization.  Brief Assessment and Rationale for Refill:  Quick Discontinue  Medication Therapy Plan:       Medication Reconciliation Completed:  No      Comments:     Note composed:5:28 PM 06/13/2022

## 2022-06-13 NOTE — TELEPHONE ENCOUNTER
No new care gaps identified.  [facility] Embedded Care Gaps. Reference number: [id number]. 6/13/2022   2:43:56 AM NAT   Admission Reconciliation is Completed  Discharge Reconciliation is Completed

## 2022-06-22 DIAGNOSIS — Z79.4 TYPE 2 DIABETES MELLITUS WITHOUT COMPLICATION, WITH LONG-TERM CURRENT USE OF INSULIN: ICD-10-CM

## 2022-06-22 DIAGNOSIS — E11.9 TYPE 2 DIABETES MELLITUS WITHOUT COMPLICATION, WITH LONG-TERM CURRENT USE OF INSULIN: ICD-10-CM

## 2022-06-22 RX ORDER — METFORMIN HYDROCHLORIDE 1000 MG/1
TABLET ORAL
Qty: 180 TABLET | Refills: 0 | Status: SHIPPED | OUTPATIENT
Start: 2022-06-22 | End: 2022-08-24 | Stop reason: SDUPTHER

## 2022-06-22 NOTE — TELEPHONE ENCOUNTER
Refill Authorization Note   Brianne Martin  is requesting a refill authorization.  Brief Assessment and Rationale for Refill:  Approve     Medication Therapy Plan:       Medication Reconciliation Completed: No   Comments:     No Care Gaps Recommended     Note composed:10:09 AM 06/22/2022

## 2022-06-22 NOTE — TELEPHONE ENCOUNTER
No new care gaps identified.  [facility] Embedded Care Gaps. Reference number: [id number]. 6/22/2022   2:39:16 AM CDT

## 2022-08-17 ENCOUNTER — INFUSION (OUTPATIENT)
Dept: INFUSION THERAPY | Facility: HOSPITAL | Age: 77
End: 2022-08-17
Attending: INTERNAL MEDICINE
Payer: MEDICARE

## 2022-08-17 ENCOUNTER — OFFICE VISIT (OUTPATIENT)
Dept: RHEUMATOLOGY | Facility: CLINIC | Age: 77
End: 2022-08-17
Payer: MEDICARE

## 2022-08-17 VITALS
HEART RATE: 71 BPM | OXYGEN SATURATION: 98 % | RESPIRATION RATE: 16 BRPM | SYSTOLIC BLOOD PRESSURE: 165 MMHG | DIASTOLIC BLOOD PRESSURE: 75 MMHG | TEMPERATURE: 99 F

## 2022-08-17 VITALS
HEART RATE: 79 BPM | WEIGHT: 145.5 LBS | SYSTOLIC BLOOD PRESSURE: 163 MMHG | DIASTOLIC BLOOD PRESSURE: 86 MMHG | HEIGHT: 62 IN | BODY MASS INDEX: 26.78 KG/M2

## 2022-08-17 DIAGNOSIS — M15.9 PRIMARY OSTEOARTHRITIS INVOLVING MULTIPLE JOINTS: ICD-10-CM

## 2022-08-17 DIAGNOSIS — M81.0 OSTEOPOROSIS, POSTMENOPAUSAL: Primary | ICD-10-CM

## 2022-08-17 DIAGNOSIS — Z71.89 COUNSELING ON HEALTH PROMOTION AND DISEASE PREVENTION: ICD-10-CM

## 2022-08-17 PROCEDURE — 99499 RISK ADDL DX/OHS AUDIT: ICD-10-PCS | Mod: S$GLB,,, | Performed by: INTERNAL MEDICINE

## 2022-08-17 PROCEDURE — 1101F PT FALLS ASSESS-DOCD LE1/YR: CPT | Mod: CPTII,S$GLB,, | Performed by: INTERNAL MEDICINE

## 2022-08-17 PROCEDURE — 1101F PR PT FALLS ASSESS DOC 0-1 FALLS W/OUT INJ PAST YR: ICD-10-PCS | Mod: CPTII,S$GLB,, | Performed by: INTERNAL MEDICINE

## 2022-08-17 PROCEDURE — 99214 PR OFFICE/OUTPT VISIT, EST, LEVL IV, 30-39 MIN: ICD-10-PCS | Mod: 25,S$GLB,, | Performed by: INTERNAL MEDICINE

## 2022-08-17 PROCEDURE — 99999 PR PBB SHADOW E&M-EST. PATIENT-LVL III: ICD-10-PCS | Mod: PBBFAC,,, | Performed by: INTERNAL MEDICINE

## 2022-08-17 PROCEDURE — 96372 THER/PROPH/DIAG INJ SC/IM: CPT

## 2022-08-17 PROCEDURE — 20610 DRAIN/INJ JOINT/BURSA W/O US: CPT | Mod: LT,S$GLB,, | Performed by: INTERNAL MEDICINE

## 2022-08-17 PROCEDURE — 99499 UNLISTED E&M SERVICE: CPT | Mod: S$GLB,,, | Performed by: INTERNAL MEDICINE

## 2022-08-17 PROCEDURE — 1125F PR PAIN SEVERITY QUANTIFIED, PAIN PRESENT: ICD-10-PCS | Mod: CPTII,S$GLB,, | Performed by: INTERNAL MEDICINE

## 2022-08-17 PROCEDURE — 1159F MED LIST DOCD IN RCRD: CPT | Mod: CPTII,S$GLB,, | Performed by: INTERNAL MEDICINE

## 2022-08-17 PROCEDURE — 3072F LOW RISK FOR RETINOPATHY: CPT | Mod: CPTII,S$GLB,, | Performed by: INTERNAL MEDICINE

## 2022-08-17 PROCEDURE — 99999 PR PBB SHADOW E&M-EST. PATIENT-LVL III: CPT | Mod: PBBFAC,,, | Performed by: INTERNAL MEDICINE

## 2022-08-17 PROCEDURE — 3288F PR FALLS RISK ASSESSMENT DOCUMENTED: ICD-10-PCS | Mod: CPTII,S$GLB,, | Performed by: INTERNAL MEDICINE

## 2022-08-17 PROCEDURE — 3288F FALL RISK ASSESSMENT DOCD: CPT | Mod: CPTII,S$GLB,, | Performed by: INTERNAL MEDICINE

## 2022-08-17 PROCEDURE — 1159F PR MEDICATION LIST DOCUMENTED IN MEDICAL RECORD: ICD-10-PCS | Mod: CPTII,S$GLB,, | Performed by: INTERNAL MEDICINE

## 2022-08-17 PROCEDURE — 99214 OFFICE O/P EST MOD 30 MIN: CPT | Mod: 25,S$GLB,, | Performed by: INTERNAL MEDICINE

## 2022-08-17 PROCEDURE — 3072F PR LOW RISK FOR RETINOPATHY: ICD-10-PCS | Mod: CPTII,S$GLB,, | Performed by: INTERNAL MEDICINE

## 2022-08-17 PROCEDURE — 99213 OFFICE O/P EST LOW 20 MIN: CPT | Mod: PBBFAC | Performed by: INTERNAL MEDICINE

## 2022-08-17 PROCEDURE — 1125F AMNT PAIN NOTED PAIN PRSNT: CPT | Mod: CPTII,S$GLB,, | Performed by: INTERNAL MEDICINE

## 2022-08-17 PROCEDURE — 20610 LARGE JOINT ASPIRATION/INJECTION: L KNEE: ICD-10-PCS | Mod: LT,S$GLB,, | Performed by: INTERNAL MEDICINE

## 2022-08-17 PROCEDURE — 63600175 PHARM REV CODE 636 W HCPCS: Mod: JG | Performed by: INTERNAL MEDICINE

## 2022-08-17 RX ORDER — TRIAMCINOLONE ACETONIDE 40 MG/ML
40 INJECTION, SUSPENSION INTRA-ARTICULAR; INTRAMUSCULAR
Status: DISCONTINUED | OUTPATIENT
Start: 2022-08-17 | End: 2022-08-17 | Stop reason: HOSPADM

## 2022-08-17 RX ADMIN — DENOSUMAB 60 MG: 60 INJECTION SUBCUTANEOUS at 04:08

## 2022-08-17 RX ADMIN — TRIAMCINOLONE ACETONIDE 40 MG: 40 INJECTION, SUSPENSION INTRA-ARTICULAR; INTRAMUSCULAR at 03:08

## 2022-08-17 NOTE — PROCEDURES
Large Joint Aspiration/Injection: L knee    Date/Time: 8/17/2022 3:00 PM  Performed by: Joseph Briggs MD  Authorized by: Joseph Briggs MD     Consent Done?:  Yes (Verbal)  Indications:  Pain  Site marked: the procedure site was marked    Timeout: prior to procedure the correct patient, procedure, and site was verified    Prep: patient was prepped and draped in usual sterile fashion    Local anesthetic:  Lidocaine 2% without epinephrine    Details:  Needle Size:  25 G  Ultrasonic Guidance for needle placement?: No    Approach:  Anterolateral  Location:  Knee  Site:  L knee  Medications:  40 mg triamcinolone acetonide 40 mg/mL  Patient tolerance:  Patient tolerated the procedure well with no immediate complications

## 2022-08-17 NOTE — PROGRESS NOTES
RHEUMATOLOGY OUTPATIENT CLINIC NOTE    8/17/2022    Attending Rheumatologist: Joseph Briggs  Primary Care Provider/Physician Requesting Consultation: Hardy Tran MD   Chief Complaint/Reason For Consultation:  Osteoarthritis and Osteoporosis      Subjective:     Brianne Martin is a 77 y.o. Patient Refused female with osteoporosis for follow up visit.    Main concern of left knee pain with mechanical pattern.  Denies falls or fractures since last encounter.  Currently not planned for invasive dental procedures.    Review of Systems   Constitutional: Negative for fever.   Musculoskeletal: Positive for joint pain. Negative for falls.   Skin: Negative for rash.   Neurological: Negative for focal weakness.       Chronic comorbid conditions affecting medical decision making today:  Past Medical History:   Diagnosis Date    Arthritis 12/10/2014    Bell's palsy 02/03/2022    Elevated LFTs 2012    negative workup    Hyperlipidemia LDL goal <100     Hypertension     Mixed anxiety and depressive disorder     Osteoporosis, postmenopausal     Overweight(278.02)     Reflux     Traumatic closed displaced bimalleolar fracture of right ankle 12/25/2013    Type II or unspecified type diabetes mellitus without mention of complication, uncontrolled     Vitamin D deficiency disease      Past Surgical History:   Procedure Laterality Date    APPENDECTOMY      CATARACT EXTRACTION      ou    COLONOSCOPY N/A 4/21/2021    Procedure: COLONOSCOPY;  Surgeon: Shanice Garcia MD;  Location: [facility];  Service: Endoscopy;  Laterality: N/A;    ORIF right ankle  12/2013    TUBAL LIGATION       Family History   Problem Relation Age of Onset    Hypertension Other     Diabetes Other     Cataracts Father     No Known Problems Mother     No Known Problems Sister     No Known Problems Brother     No Known Problems Maternal Aunt     No Known Problems Maternal Uncle     No Known Problems Paternal Aunt     No Known  "Problems Paternal Uncle     No Known Problems Maternal Grandmother     No Known Problems Maternal Grandfather     No Known Problems Paternal Grandmother     No Known Problems Paternal Grandfather     Amblyopia Neg Hx     Blindness Neg Hx     Cancer Neg Hx     Glaucoma Neg Hx     Macular degeneration Neg Hx     Retinal detachment Neg Hx     Strabismus Neg Hx     Stroke Neg Hx     Thyroid disease Neg Hx      Social History     Substance and Sexual Activity   Alcohol Use Yes    Comment: socially     Social History     Tobacco Use   Smoking Status Never Smoker   Smokeless Tobacco Never Used     Social History     Substance and Sexual Activity   Drug Use Never       Current Outpatient Medications:     alendronate (FOSAMAX) 70 MG tablet, TAKE 1 CAPSULE EVERY WEEK (MEDICINE AS VITAMIN, Disp: , Rfl:     alirocumab (PRALUENT PEN) 150 mg/mL PnIj, Inject 1 mL (150 mg total) into the skin every 14 (fourteen) days., Disp: 6 mL, Rfl: 3    aspirin (ECOTRIN) 81 MG EC tablet, , Disp: , Rfl:     blood sugar diagnostic Strp, Contour test strips. Test bid-tid., Disp: 100 strip, Rfl: 11    cholecalciferol, vitamin D3, 125 mcg (5,000 unit) capsule, Take 1 capsule (5,000 Units total) by mouth once daily., Disp: 90 capsule, Rfl: 3    denosumab (PROLIA) 60 mg/mL Syrg, Inject 1 mL (60 mg total) into the skin every 6 (six) months., Disp: 2 mL, Rfl: 0    fluconazole (DIFLUCAN) 150 MG Tab, , Disp: , Rfl:     fluticasone propionate (FLONASE) 50 mcg/actuation nasal spray, 1 spray (50 mcg total) by Each Nostril route once daily., Disp: 16 g, Rfl: 11    gabapentin (NEURONTIN) 300 MG capsule, Take 1 capsule (300 mg total) by mouth 2 (two) times daily., Disp: 180 capsule, Rfl: 3    glucagon (GLUCAGON, HUMAN RECOMBINANT,) 1 mg Kit, Inject 1 mg into the muscle as needed., Disp: , Rfl:     insulin needles, disposable, (BD INSULIN PEN NEEDLE UF MINI) 31 x 3/16 " Ndle, Inject 1 Units into the skin every evening., Disp: 100 each, " Rfl: 3    losartan (COZAAR) 100 MG tablet, TAKE ONE TABLET EVERY DAY (FOR BLOOD PRESSURE), Disp: , Rfl:     meloxicam (MOBIC) 7.5 MG tablet, TAKE ONE TABLET EVERY DAY (MEDICINE PAIN), Disp: , Rfl:     metFORMIN (GLUCOPHAGE) 1000 MG tablet, TAKE 1 TABLET TWICE DAILY WITH MEALS, Disp: 180 tablet, Rfl: 0    multivitamin Liqd, Take by mouth., Disp: , Rfl:     NOVOLOG FLEXPEN U-100 INSULIN 100 unit/mL (3 mL) InPn pen, INJECT 20 UNITS INTO THE SKIN 3 TIMES DAILY WITH MEALS, Disp: 60 mL, Rfl: 0    omega-3 acid ethyl esters (LOVAZA) 1 gram capsule, TAKE 2 CAPSULES TWICE DAILY, Disp: 360 capsule, Rfl: 3    omeprazole (PRILOSEC) 40 MG capsule, TAKE 1 CAPSULE EVERY MORNING, Disp: 90 capsule, Rfl: 2    promethazine-dextromethorphan (PROMETHAZINE-DM) 6.25-15 mg/5 mL Syrp, Take 5 mLs by mouth every 6 (six) hours as needed. AS NEEDED FOR COUGH, Disp: , Rfl:     simvastatin (ZOCOR) 40 MG tablet, TAKE 1 TABLET EVERY DAY, Disp: 90 tablet, Rfl: 2    TRULICITY 1.5 mg/0.5 mL pen injector, INJECT 1.5MG (1 PEN) SUBCUTANEOUSLY EVERY 7 DAYS, Disp: 4 pen, Rfl: 11    valACYclovir (VALTREX) 1000 MG tablet, Take 1,000 mg by mouth 3 (three) times daily., Disp: , Rfl:     venlafaxine (EFFEXOR) 75 MG tablet, TAKE 1 TABLET TWICE DAILY, Disp: 180 tablet, Rfl: 2    VITAMIN D2 1,250 mcg (50,000 unit) capsule, TAKE 1 CAPSULE EVERY WEEK (MEDICINE AS VITAMIN, Disp: , Rfl:     white petrolatum-mineral oil 57.3-42.5% (REFRESH P.M.) 57.3-42.5 % Oint, Place into the left eye every evening., Disp: 3.5 g, Rfl: 2    cycloSPORINE (RESTASIS) 0.05 % ophthalmic emulsion, Place 1 drop into both eyes 2 (two) times daily., Disp: 180 vial, Rfl: 2    latanoprost (XALATAN) 0.005 % ophthalmic solution, Place 1 drop into both eyes every evening., Disp: 2.5 mL, Rfl: 1    levocetirizine (XYZAL) 5 MG tablet, Take 1 tablet (5 mg total) by mouth every evening., Disp: 30 tablet, Rfl: 11    olopatadine (PAZEO) 0.7 % Drop, Place 1 drop into both eyes every  morning., Disp: 5 mL, Rfl: 3     Objective:     Vitals:    08/17/22 1503   BP: (!) 163/86   Pulse: 79     Physical Exam   Eyes: Conjunctivae are normal.   Pulmonary/Chest: Effort normal. No respiratory distress.   Musculoskeletal:         General: No swelling or tenderness. Normal range of motion.   Skin: No rash noted.       Reviewed available old and all outside pertinent medical records available.    All lab results personally reviewed and interpreted by me.  Lab Results   Component Value Date    WBC 5.41 03/17/2021    HGB 12.6 03/17/2021    HCT 39.7 03/17/2021    MCV 91 03/17/2021    RDW 13.8 03/17/2021     03/17/2021       Lab Results   Component Value Date     08/17/2022    K 4.5 08/17/2022     08/17/2022    CO2 26 08/17/2022     (H) 08/17/2022    BUN 11 08/17/2022    CALCIUM 10.0 08/17/2022    PROT 7.4 08/17/2022    ALBUMIN 3.8 08/17/2022    BILITOT 0.4 08/17/2022    AST 31 08/17/2022    ALKPHOS 59 08/17/2022    ALT 28 08/17/2022       Lab Results   Component Value Date    COLORU Yellow 06/02/2022    APPEARANCEUA Clear 12/26/2013    SPECGRAV 1.015 06/02/2022    PHUR 5 06/02/2022    PROTEINUA Negative 12/26/2013    KETONESU NEGATIVE 06/02/2022    LEUKOCYTESUR Negative 12/26/2013    NITRITE POSITIVE 06/02/2022    UROBILINOGEN NORMAL 06/02/2022       Lab Results   Component Value Date    PTH 65.0 06/19/2020       No results found for: URICACID    No results found for: CRP, ERYTHROCYTES    No results found for: ANATITER    No components found for: TSPOTTB,  QUANTIFERON     ASSESSMENT:     Osteoporosis    Osteoarthritis    Other specified counseling    PLAN:     Follow-up encounter for osteoporosis.  On Prolia since approximately 2019. Repeat densitometry earlier this year with osteoporotic T-scores.  Planning on continue Prolia for another year and repeating densitometry test.  If stagnant or declining T-scores, will switch to bone anabolic therapy if no absolute contraindications.   Denies history of bone radiation or nephrolithiasis within the year.  Continue CMP within 2 weeks of receiving Prolia ensure serum calcium okay.  Add vitamin-D and PTH level prior follow-up encounter.    Main concern of left knee pain with predominant mechanical pattern.  Lower back pain with similar characteristics.  No association with trauma or alarm signs or symptoms.  Will provide local corticosteroid injection of left knee and refer to physical therapy.  Side effects of therapy discussed, written literature provided.    Disclaimer: This note is prepared using voice recognition software and as such is likely to have errors and has not been proof read. Please contact me for questions.     Large Joint Aspiration/Injection: L knee    Date/Time: 8/17/2022 3:00 PM  Performed by: Joseph Briggs MD  Authorized by: Joseph Briggs MD     Consent Done?:  Yes (Verbal)  Indications:  Pain  Site marked: the procedure site was marked    Timeout: prior to procedure the correct patient, procedure, and site was verified    Prep: patient was prepped and draped in usual sterile fashion    Local anesthetic:  Lidocaine 2% without epinephrine    Details:  Needle Size:  25 G  Ultrasonic Guidance for needle placement?: No    Approach:  Anterolateral  Location:  Knee  Site:  L knee  Medications:  40 mg triamcinolone acetonide 40 mg/mL  Patient tolerance:  Patient tolerated the procedure well with no immediate complications        Joseph Briggs M.D.

## 2022-08-24 DIAGNOSIS — E11.9 TYPE 2 DIABETES MELLITUS WITHOUT COMPLICATION, WITH LONG-TERM CURRENT USE OF INSULIN: ICD-10-CM

## 2022-08-24 DIAGNOSIS — Z79.4 TYPE 2 DIABETES MELLITUS WITHOUT COMPLICATION, WITH LONG-TERM CURRENT USE OF INSULIN: ICD-10-CM

## 2022-08-24 RX ORDER — METFORMIN HYDROCHLORIDE 1000 MG/1
1000 TABLET ORAL 2 TIMES DAILY WITH MEALS
Qty: 180 TABLET | Refills: 3 | Status: SHIPPED | OUTPATIENT
Start: 2022-08-24 | End: 2023-06-03

## 2022-08-24 NOTE — TELEPHONE ENCOUNTER
----- Message from Gilma Faith sent at 8/24/2022 11:57 AM CDT -----  Type:  RX Refill Request    Who Called: pt  Refill or New Rx:refill  RX Name and Strength metFORMIN (GLUCOPHAGE) 1000 MG tablet  How is the patient currently taking it? (ex. 1XDay):2xday  Is this a 30 day or 90 day RX:90  Preferred Pharmacy with phone number:Local or Mail Order [facility] (NOW [facility]) - [facility]  Ordering Provider:  Would the patient rather a call back or a response via [facility]? Call back  Best Call Back Number:Click to [phone]  Additional Information:

## 2022-08-30 ENCOUNTER — OFFICE VISIT (OUTPATIENT)
Dept: FAMILY MEDICINE | Facility: CLINIC | Age: 77
End: 2022-08-30
Payer: MEDICARE

## 2022-08-30 VITALS
TEMPERATURE: 98 F | RESPIRATION RATE: 18 BRPM | HEART RATE: 87 BPM | DIASTOLIC BLOOD PRESSURE: 71 MMHG | BODY MASS INDEX: 28.16 KG/M2 | WEIGHT: 153 LBS | HEIGHT: 62 IN | SYSTOLIC BLOOD PRESSURE: 144 MMHG | OXYGEN SATURATION: 98 %

## 2022-08-30 DIAGNOSIS — I10 HYPERTENSION, UNSPECIFIED TYPE: Primary | ICD-10-CM

## 2022-08-30 DIAGNOSIS — E78.5 HYPERLIPIDEMIA, UNSPECIFIED HYPERLIPIDEMIA TYPE: ICD-10-CM

## 2022-08-30 DIAGNOSIS — E11.9 TYPE 2 DIABETES MELLITUS WITHOUT COMPLICATION, UNSPECIFIED WHETHER LONG TERM INSULIN USE: ICD-10-CM

## 2022-08-30 DIAGNOSIS — K21.9 GASTROESOPHAGEAL REFLUX DISEASE, UNSPECIFIED WHETHER ESOPHAGITIS PRESENT: ICD-10-CM

## 2022-08-30 DIAGNOSIS — Z00.00 ROUTINE GENERAL MEDICAL EXAMINATION AT A HEALTH CARE FACILITY: ICD-10-CM

## 2022-08-30 LAB — GLUCOSE SERPL-MCNC: 127 MG/DL (ref 70–110)

## 2022-08-30 PROCEDURE — 99215 OFFICE O/P EST HI 40 MIN: CPT | Mod: PBBFAC

## 2022-08-30 PROCEDURE — 82962 GLUCOSE BLOOD TEST: CPT | Mod: PBBFAC

## 2022-08-30 RX ORDER — OMEPRAZOLE 40 MG/1
40 CAPSULE, DELAYED RELEASE ORAL EVERY MORNING
Qty: 30 CAPSULE | Refills: 2 | Status: SHIPPED | OUTPATIENT
Start: 2022-08-30 | End: 2023-06-03

## 2022-08-30 RX ORDER — LISINOPRIL 10 MG/1
10 TABLET ORAL DAILY
Qty: 90 TABLET | Refills: 3 | Status: CANCELLED | OUTPATIENT
Start: 2022-08-30 | End: 2023-08-30

## 2022-08-30 RX ORDER — SIMVASTATIN 40 MG/1
40 TABLET, FILM COATED ORAL DAILY
Qty: 30 TABLET | Refills: 2 | Status: SHIPPED | OUTPATIENT
Start: 2022-08-30 | End: 2023-01-25

## 2022-08-30 RX ORDER — CLOPIDOGREL BISULFATE 75 MG/1
75 TABLET ORAL DAILY
Qty: 30 TABLET | Refills: 2 | Status: SHIPPED | OUTPATIENT
Start: 2022-08-30 | End: 2022-10-20 | Stop reason: SDUPTHER

## 2022-08-30 RX ORDER — LOSARTAN POTASSIUM 100 MG/1
TABLET ORAL
Qty: 30 TABLET | Refills: 2 | OUTPATIENT
Start: 2022-08-30 | End: 2022-10-20 | Stop reason: SDUPTHER

## 2022-08-30 NOTE — PROGRESS NOTES
Subjective:       Patient ID: Brianne Martin is a 77 y.o. female.    Chief Complaint: Hypertension, Hyperlipidemia, and Medication Refill    HPI  Pt is a 77yr old female with a PMH of HTN, HLD, and GERD presenting to the office today to establish care. Needs medication refills today. Has been without her BP and cholesterol medications for 4-5 months, did not have a ride to get here (lives in [place]). Tried to obtain history from pt with tranlator, said pt kept repeating that she just wanted her medications refilled. No complaints today, pt said she is feeling fine.     Per chart review pt was last seen on 6/2/22 by an Internal Medicine office by Tom Carrasco NP in [place] for a UTI. Pt is an insulin dependent diabetic taking metformin, 20 units of novolog TID and trulicity. Sugars had been within the  range.   H/O hypertension that was effectively being treated with losartan according to Ms Carrasco's last note. (Pt asking for bp medication refill today but showed a picture of Lisinopril and warfarin).    PMH - Stroke in 2013  Hospitalizations - 2020 for a cold (thought to be COVID-19)  Surgeries - Denied  Social - Lives alone and is able to maintain household chores. Is not able to drive.     Specialists:  Rheumatologist - Dr Joseph Briggs  Cardiologist - ?     used - Ricardo [id number]    Review of Systems   Constitutional:  Negative for activity change, fever and unexpected weight change.   HENT:  Negative for nasal congestion and sore throat.    Respiratory:  Negative for cough, chest tightness and shortness of breath.    Cardiovascular:  Negative for chest pain and leg swelling.   Gastrointestinal:  Negative for abdominal distention, abdominal pain and vomiting.   Musculoskeletal:  Negative for arthralgias.        Objective:      Vitals:    08/30/22 1319   BP: (!) 144/71   Pulse: 87   Resp: 18   Temp: 98.2 °F (36.8 °C)     Weight - 69.4 kg    Physical Exam  Constitutional:       General: She is  not in acute distress.     Appearance: Normal appearance.   HENT:      Mouth/Throat:      Mouth: Mucous membranes are moist.   Cardiovascular:      Rate and Rhythm: Normal rate and regular rhythm.      Heart sounds: No murmur heard.  Pulmonary:      Effort: No respiratory distress.      Breath sounds: Normal breath sounds. No wheezing.   Abdominal:      General: Abdomen is flat. Bowel sounds are normal. There is no distension.      Palpations: Abdomen is soft.      Tenderness: There is no abdominal tenderness. There is no guarding.   Musculoskeletal:      Comments: 1+ pretibial edema present in BLE   Skin:     General: Skin is warm and dry.         Assessment/Plan:  Hypertension, unspecified type  BP today was 144/71  Pt asking for medication refills - showed a picture of lisinopril bottle. According to pt allergies she can not have Ace-inhibitors. Per chart check pt was being effectively treated with losartan 100mg as of 6/2/22. Will send refill of losartan.   Encouraged pt to keep log of BP checks at home and bring with her next visit.   -     losartan (COZAAR) 100 MG tablet; TAKE ONE TABLET EVERY DAY (FOR BLOOD PRESSURE) Strength: 100 mg  Dispense: 30 tablet; Refill: 2    Hyperlipidemia, unspecified hyperlipidemia type  Refill sent   Last cholesterol panel done on 6/2/22 - within normal limits   -     simvastatin (ZOCOR) 40 MG tablet; Take 1 tablet (40 mg total) by mouth once daily.  Dispense: 30 tablet; Refill: 2    Type 2 diabetes mellitus without complication, unspecified whether long term insulin use  Pt denied needing any medication refills   Blood sugar checked in office - 127  Encouraged healthy well balanced diet  Follow up on diabetic eye exam next visit.  -     POCT Glucose, Hand-Held Device    GERD   Pt showed medication slip from pharmacy last filled in 2021 which was pantoprazole. Per chart pt was taking omeprazole 40mg and still has 2 refills remaining.   Refill of omeprazole sent    Routine General  Exam at a Healthcare Facility  Pt signed release form to obtain records from cardiologist - unable to say who she was being treated by or when her next apt was.   Pt takes over the counter aspirin and fish oil  Previous prescription for plavix - refill given today.   -     clopidogreL (PLAVIX) 75 mg tablet; Take 1 tablet (75 mg total) by mouth once daily.  Dispense: 30 tablet; Refill: 2      Follow up in about 3 months (around 11/30/2022) for blood pressure check.      Wellness Maintenance:   Diabetic Eye exam - referral given on 6/2/22  Colonoscopy - referral given on 6/2/22  Blood work - 6/2/22 and 8/17/22 BMP and Cholesterol within normal limits; needs CBC  Bone scan - referral given on 8/17/22     John Roman MD  [facility]

## 2022-09-13 ENCOUNTER — PATIENT MESSAGE (OUTPATIENT)
Dept: INTERNAL MEDICINE | Facility: CLINIC | Age: 77
End: 2022-09-13
Payer: MEDICARE

## 2022-09-26 NOTE — PROGRESS NOTES
Faculty Attestation: Brianne Martin  was seen in Family Medicine Clinic. Patient seen and evaluated at the time of the visit. History of Present Illness, Physical Exam, and Assessment and Plan reviewed. Treatment plan is reasonable and appropriate. Compliance with treatment recommendations is important.  No imaging studies were done today.  No procedure was performed.     Zana Moss MD

## 2022-10-12 ENCOUNTER — PATIENT MESSAGE (OUTPATIENT)
Dept: INTERNAL MEDICINE | Facility: CLINIC | Age: 77
End: 2022-10-12
Payer: MEDICARE

## 2022-10-13 ENCOUNTER — PATIENT OUTREACH (OUTPATIENT)
Dept: ADMINISTRATIVE | Facility: CLINIC | Age: 77
End: 2022-10-13
Payer: MEDICARE

## 2022-10-13 ENCOUNTER — EXTERNAL HOSPITAL ADMISSION (OUTPATIENT)
Dept: ADMINISTRATIVE | Facility: CLINIC | Age: 77
End: 2022-10-13
Payer: MEDICARE

## 2022-10-13 NOTE — PROGRESS NOTES
C3 nurse spoke with Brianne Martin(daughter) for a [facility]CC post hospital discharge follow up call. The patient has a scheduled [facility] appointment with Ashia Carrasco on 10/20/22 @ 8520.

## 2022-10-19 ENCOUNTER — PATIENT OUTREACH (OUTPATIENT)
Dept: ADMINISTRATIVE | Facility: HOSPITAL | Age: 77
End: 2022-10-19
Payer: MEDICARE

## 2022-10-19 RX ORDER — OMEGA-3-ACID ETHYL ESTERS 1 G/1
2 CAPSULE, LIQUID FILLED ORAL 2 TIMES DAILY
Qty: 360 CAPSULE | Refills: 1 | Status: SHIPPED | OUTPATIENT
Start: 2022-10-19 | End: 2023-10-16

## 2022-10-19 NOTE — TELEPHONE ENCOUNTER
Refill Decision Note   Brianne Martin  is requesting a refill authorization.  Brief Assessment and Rationale for Refill:  Approve     Medication Therapy Plan:       Medication Reconciliation Completed: No   Comments:     No Care Gaps recommended.     Note composed:8:37 AM 10/19/2022

## 2022-10-19 NOTE — TELEPHONE ENCOUNTER
No new care gaps identified.  [facility] Embedded Care Gaps. Reference number: [id number]. 10/19/2022   2:49:28 AM CDT

## 2022-10-20 ENCOUNTER — OFFICE VISIT (OUTPATIENT)
Dept: INTERNAL MEDICINE | Facility: CLINIC | Age: 77
End: 2022-10-20
Payer: MEDICARE

## 2022-10-20 VITALS
HEIGHT: 62 IN | BODY MASS INDEX: 26.09 KG/M2 | HEART RATE: 87 BPM | WEIGHT: 141.75 LBS | OXYGEN SATURATION: 98 % | SYSTOLIC BLOOD PRESSURE: 134 MMHG | TEMPERATURE: 99 F | DIASTOLIC BLOOD PRESSURE: 74 MMHG

## 2022-10-20 DIAGNOSIS — I15.2 HYPERTENSION ASSOCIATED WITH DIABETES: ICD-10-CM

## 2022-10-20 DIAGNOSIS — E11.59 HYPERTENSION ASSOCIATED WITH DIABETES: ICD-10-CM

## 2022-10-20 DIAGNOSIS — E16.2 HYPOGLYCEMIA: ICD-10-CM

## 2022-10-20 DIAGNOSIS — Z09 HOSPITAL DISCHARGE FOLLOW-UP: Primary | ICD-10-CM

## 2022-10-20 PROCEDURE — 3075F SYST BP GE 130 - 139MM HG: CPT | Mod: CPTII,S$GLB,, | Performed by: NURSE PRACTITIONER

## 2022-10-20 PROCEDURE — 99999 PR PBB SHADOW E&M-EST. PATIENT-LVL V: ICD-10-PCS | Mod: PBBFAC,,, | Performed by: NURSE PRACTITIONER

## 2022-10-20 PROCEDURE — 3078F PR MOST RECENT DIASTOLIC BLOOD PRESSURE < 80 MM HG: ICD-10-PCS | Mod: CPTII,S$GLB,, | Performed by: NURSE PRACTITIONER

## 2022-10-20 PROCEDURE — 1125F AMNT PAIN NOTED PAIN PRSNT: CPT | Mod: CPTII,S$GLB,, | Performed by: NURSE PRACTITIONER

## 2022-10-20 PROCEDURE — 3075F PR MOST RECENT SYSTOLIC BLOOD PRESS GE 130-139MM HG: ICD-10-PCS | Mod: CPTII,S$GLB,, | Performed by: NURSE PRACTITIONER

## 2022-10-20 PROCEDURE — 1125F PR PAIN SEVERITY QUANTIFIED, PAIN PRESENT: ICD-10-PCS | Mod: CPTII,S$GLB,, | Performed by: NURSE PRACTITIONER

## 2022-10-20 PROCEDURE — 99499 RISK ADDL DX/OHS AUDIT: ICD-10-PCS | Mod: HCNC,S$GLB,, | Performed by: NURSE PRACTITIONER

## 2022-10-20 PROCEDURE — 3078F DIAST BP <80 MM HG: CPT | Mod: CPTII,S$GLB,, | Performed by: NURSE PRACTITIONER

## 2022-10-20 PROCEDURE — 1160F PR REVIEW ALL MEDS BY PRESCRIBER/CLIN PHARMACIST DOCUMENTED: ICD-10-PCS | Mod: CPTII,S$GLB,, | Performed by: NURSE PRACTITIONER

## 2022-10-20 PROCEDURE — 1159F MED LIST DOCD IN RCRD: CPT | Mod: CPTII,S$GLB,, | Performed by: NURSE PRACTITIONER

## 2022-10-20 PROCEDURE — 1159F PR MEDICATION LIST DOCUMENTED IN MEDICAL RECORD: ICD-10-PCS | Mod: CPTII,S$GLB,, | Performed by: NURSE PRACTITIONER

## 2022-10-20 PROCEDURE — 3072F PR LOW RISK FOR RETINOPATHY: ICD-10-PCS | Mod: CPTII,S$GLB,, | Performed by: NURSE PRACTITIONER

## 2022-10-20 PROCEDURE — 99999 PR PBB SHADOW E&M-EST. PATIENT-LVL V: CPT | Mod: PBBFAC,,, | Performed by: NURSE PRACTITIONER

## 2022-10-20 PROCEDURE — 99499 UNLISTED E&M SERVICE: CPT | Mod: HCNC,S$GLB,, | Performed by: NURSE PRACTITIONER

## 2022-10-20 PROCEDURE — 3072F LOW RISK FOR RETINOPATHY: CPT | Mod: CPTII,S$GLB,, | Performed by: NURSE PRACTITIONER

## 2022-10-20 PROCEDURE — 99495 TRANSJ CARE MGMT MOD F2F 14D: CPT | Mod: S$GLB,,, | Performed by: NURSE PRACTITIONER

## 2022-10-20 PROCEDURE — 1160F RVW MEDS BY RX/DR IN RCRD: CPT | Mod: CPTII,S$GLB,, | Performed by: NURSE PRACTITIONER

## 2022-10-20 PROCEDURE — 99495 TCM SERVICES (MODERATE COMPLEXITY): ICD-10-PCS | Mod: S$GLB,,, | Performed by: NURSE PRACTITIONER

## 2022-10-20 RX ORDER — AMLODIPINE BESYLATE 10 MG/1
1 TABLET ORAL EVERY MORNING
COMMUNITY
Start: 2022-10-13 | End: 2022-10-20 | Stop reason: SDUPTHER

## 2022-10-20 RX ORDER — CLOPIDOGREL BISULFATE 75 MG/1
75 TABLET ORAL DAILY
Qty: 90 TABLET | Refills: 2 | Status: SHIPPED | OUTPATIENT
Start: 2022-10-20 | End: 2023-12-01 | Stop reason: SDUPTHER

## 2022-10-20 RX ORDER — LOSARTAN POTASSIUM 100 MG/1
TABLET ORAL
Qty: 90 TABLET | Refills: 2 | Status: SHIPPED | OUTPATIENT
Start: 2022-10-20 | End: 2023-12-01 | Stop reason: SDUPTHER

## 2022-10-20 RX ORDER — AMLODIPINE BESYLATE 10 MG/1
10 TABLET ORAL EVERY MORNING
Qty: 90 TABLET | Refills: 2 | Status: SHIPPED | OUTPATIENT
Start: 2022-10-20 | End: 2023-12-01 | Stop reason: SDUPTHER

## 2022-10-20 RX ORDER — AMOXICILLIN 500 MG
2 CAPSULE ORAL
COMMUNITY
End: 2023-04-10 | Stop reason: SDUPTHER

## 2022-10-20 NOTE — PROGRESS NOTES
Subjective:       Patient ID: Brianne Martin is a 77 y.o. female.    Chief Complaint: Follow-up (From hospital visit)    Pt presents to clinic today with her daughter for hospital follow up  Pt was admitted due to hypoglycemia  She was found unresponsive on the floor and brought to ER  Glucose read low on meter   Pt was dosing insulin and not checking sugars and or not eating  Since discharge from hospital, she has only checked her sugar twice  There was some confusion as to how to use the machine  One reading was 128, other was 230  Home health readings have been elevated but they are directly after she is eating  For now she is holding her insulin but taking trulicity and metformin    BP was elevated during her hospital stay  They added losartan and norvasc   Bp controlled today           Transitional Care Note    Family and/or Caretaker present at visit?  Yes.  Diagnostic tests reviewed/disposition: I have reviewed all completed as well as pending diagnostic tests at the time of discharge.  Disease/illness education: diabetes  Home health/community services discussion/referrals: Patient has home health established at [facility].   Establishment or re-establishment of referral orders for community resources: No other necessary community resources.   Discussion with other health care providers: No discussion with other health care providers necessary.              Reason for Hospitalization   Patient is a 77-year-old woman who is somnolent, arousable and falls back to sleep and gives limited history. Her daughter at the bedside gives a better history. Case also discussed with emergency department physician. She is a 77-year-old woman with history of diabetes mellitus on Trulicity weekly injections and short acting insulin and metformin. She has HTN as well.      She presents to the ED after being found on the floor this evening and generally unresponsive. Glucose meter read low. Upon EMS evaluation she was given D50  with improvement of her glucose to the low 100s. Upon arrival in the ED here, repeat values showed again glucose low at 19 which was confirmed on fingerstick monitor. She was given a dose of D50 as well as D10 infusion and was also given a meal to eat. Patient complains of generalized weakness, decreased appetite. She did not eat dinner today. She denies overdose, accidental or intentional. Overall sense of malaise over the last 24 hours. No known exacerbating or alleviating factors. She was confused and lethargic which improved significantly after glucose administration and improved glucose numbers.    Falls  Patient and family do report a history of falls. I am concerned that these maybe associated with unreported episodes of hypoglycemia. She has been evaluated by PT/OT. They are recommending home with home health and 24-hour supervision. Per my discussion with the patient's daughter Dorcas the patient will be staying with her for the foreseeable future until the patient's other daughter is back from [facility].    Prolonged Q-T interval on ECG  No acute issues. Continue to avoid QT prolonging agents.    Renal stones  Incidental finding on imaging. Patient denies any pain. Continue to monitor    Hypertension associated with diabetes (HCC)  Blood pressure elevated during hospital stay. Added Norvasc to her losartan regimen. She will need further titration of antihypertensive medications in the outpatient setting    Type 2 diabetes mellitus with hypoglycemia, with long-term current use of insulin ([facility])  Hemoglobin A1c pending at the time of discharge. I have advised patient and her daughter Dorcas to discontinue her premeal insulin as I suspect that this is causing unreported episodes of hypoglycemia. Patient can continue metformin and Trulicity.    * Hypoglycemia  Patient has had no further episodes of hypoglycemia. She has been off the D10 drip for over 24 hours.    Significant Medication Changes:   NovoLog insulin  "has been discontinued.  Norvasc is a new prescription.            /74   Pulse 87   Temp 99.3 °F (37.4 °C) (Temporal)   Ht 5' 2" (1.575 m)   Wt 64.3 kg (141 lb 12.1 oz)   SpO2 98%   BMI 25.93 kg/m²     Review of Systems   Constitutional:  Positive for fatigue. Negative for activity change, appetite change, chills, diaphoresis, fever and unexpected weight change.   HENT: Negative.     Respiratory:  Negative for cough and shortness of breath.    Cardiovascular:  Negative for chest pain, palpitations and leg swelling.   Gastrointestinal: Negative.    Genitourinary: Negative.    Musculoskeletal: Negative.    Skin:  Negative for color change, pallor, rash and wound.   Allergic/Immunologic: Negative for immunocompromised state.   Neurological:  Positive for weakness. Negative for dizziness and facial asymmetry.   Hematological:  Negative for adenopathy. Does not bruise/bleed easily.   Psychiatric/Behavioral:  Negative for agitation, behavioral problems and confusion.      Objective:      Physical Exam  Vitals and nursing note reviewed.   Constitutional:       General: She is not in acute distress.     Appearance: Normal appearance. She is well-developed. She is not diaphoretic.   HENT:      Head: Normocephalic and atraumatic.   Cardiovascular:      Rate and Rhythm: Normal rate and regular rhythm.      Heart sounds: Normal heart sounds. No murmur heard.  Pulmonary:      Effort: Pulmonary effort is normal. No respiratory distress.      Breath sounds: Normal breath sounds.   Musculoskeletal:         General: Normal range of motion.   Skin:     General: Skin is warm and dry.      Findings: No rash.   Neurological:      Mental Status: She is alert.      Motor: Weakness present.      Coordination: Abnormal coordination: wheelchair.   Psychiatric:         Mood and Affect: Mood normal.         Behavior: Behavior normal. Behavior is cooperative.         Thought Content: Thought content normal.         Judgment: Judgment " normal.       Assessment:       1. Hospital discharge follow-up    2. Hypoglycemia    3. Hypertension associated with diabetes        Plan:       Brianne was seen today for follow-up.    Diagnoses and all orders for this visit:    Hospital discharge follow-up  Hypoglycemia       - Continue to monitor blood sugars. Please send record like we discussed of home readings so med adjustments can be made  Hypertension associated with diabetes  -     amLODIPine (NORVASC) 10 MG tablet; Take 1 tablet (10 mg total) by mouth every morning.  -     losartan (COZAAR) 100 MG tablet; TAKE ONE TABLET EVERY DAY (FOR BLOOD PRESSURE)  Strength: 100 mg  -     clopidogreL (PLAVIX) 75 mg tablet; Take 1 tablet (75 mg total) by mouth once daily.  - Chronic, stable on current meds, continue     Patient Instructions   Check blood sugars 3-4 times per day    First thing in the morning when you wake up- FASTING  Before lunch  Before dinner  2 hrs after your largest meal of the day    Keep a diary  Send me a portal message on Tues of next week with glucose readings  I will add insulin if needed at that point    Continue Metformin and Trulicity for now    3 month follow up with DR. Tran  [employer] paperwork completed for pt  Follow up for worsening or no improvement in symptoms and PRN.

## 2022-10-20 NOTE — PATIENT INSTRUCTIONS
Check blood sugars 3-4 times per day    First thing in the morning when you wake up- FASTING  Before lunch  Before dinner  2 hrs after your largest meal of the day    Keep a diary  Send me a portal message on Tues of next week with glucose readings  I will add insulin if needed at that point    Continue Metformin and Trulicity for now

## 2022-11-01 ENCOUNTER — PATIENT MESSAGE (OUTPATIENT)
Dept: INTERNAL MEDICINE | Facility: CLINIC | Age: 77
End: 2022-11-01
Payer: MEDICARE

## 2022-11-14 ENCOUNTER — LAB VISIT (OUTPATIENT)
Dept: LAB | Facility: HOSPITAL | Age: 77
End: 2022-11-14
Attending: FAMILY MEDICINE
Payer: MEDICARE

## 2022-11-14 ENCOUNTER — OFFICE VISIT (OUTPATIENT)
Dept: INTERNAL MEDICINE | Facility: CLINIC | Age: 77
End: 2022-11-14
Payer: MEDICARE

## 2022-11-14 VITALS
HEART RATE: 83 BPM | HEIGHT: 62 IN | SYSTOLIC BLOOD PRESSURE: 112 MMHG | WEIGHT: 145.31 LBS | OXYGEN SATURATION: 99 % | DIASTOLIC BLOOD PRESSURE: 70 MMHG | BODY MASS INDEX: 26.74 KG/M2 | TEMPERATURE: 97 F

## 2022-11-14 DIAGNOSIS — Z12.31 ENCOUNTER FOR SCREENING MAMMOGRAM FOR HIGH-RISK PATIENT: ICD-10-CM

## 2022-11-14 DIAGNOSIS — E11.9 TYPE 2 DIABETES MELLITUS WITHOUT COMPLICATION, WITHOUT LONG-TERM CURRENT USE OF INSULIN: ICD-10-CM

## 2022-11-14 DIAGNOSIS — E11.9 TYPE 2 DIABETES MELLITUS WITHOUT COMPLICATION, WITHOUT LONG-TERM CURRENT USE OF INSULIN: Primary | ICD-10-CM

## 2022-11-14 LAB
ESTIMATED AVG GLUCOSE: 146 MG/DL (ref 68–131)
HBA1C MFR BLD: 6.7 % (ref 4–5.6)

## 2022-11-14 PROCEDURE — 1100F PTFALLS ASSESS-DOCD GE2>/YR: CPT | Mod: CPTII,S$GLB,, | Performed by: INTERNAL MEDICINE

## 2022-11-14 PROCEDURE — 99213 PR OFFICE/OUTPT VISIT, EST, LEVL III, 20-29 MIN: ICD-10-PCS | Mod: S$GLB,,, | Performed by: INTERNAL MEDICINE

## 2022-11-14 PROCEDURE — 99999 PR PBB SHADOW E&M-EST. PATIENT-LVL IV: ICD-10-PCS | Mod: PBBFAC,,, | Performed by: INTERNAL MEDICINE

## 2022-11-14 PROCEDURE — 3072F LOW RISK FOR RETINOPATHY: CPT | Mod: CPTII,S$GLB,, | Performed by: INTERNAL MEDICINE

## 2022-11-14 PROCEDURE — 1159F MED LIST DOCD IN RCRD: CPT | Mod: CPTII,S$GLB,, | Performed by: INTERNAL MEDICINE

## 2022-11-14 PROCEDURE — 1160F PR REVIEW ALL MEDS BY PRESCRIBER/CLIN PHARMACIST DOCUMENTED: ICD-10-PCS | Mod: CPTII,S$GLB,, | Performed by: INTERNAL MEDICINE

## 2022-11-14 PROCEDURE — 99999 PR PBB SHADOW E&M-EST. PATIENT-LVL IV: CPT | Mod: PBBFAC,,, | Performed by: INTERNAL MEDICINE

## 2022-11-14 PROCEDURE — 3074F PR MOST RECENT SYSTOLIC BLOOD PRESSURE < 130 MM HG: ICD-10-PCS | Mod: CPTII,S$GLB,, | Performed by: INTERNAL MEDICINE

## 2022-11-14 PROCEDURE — 3078F DIAST BP <80 MM HG: CPT | Mod: CPTII,S$GLB,, | Performed by: INTERNAL MEDICINE

## 2022-11-14 PROCEDURE — 3078F PR MOST RECENT DIASTOLIC BLOOD PRESSURE < 80 MM HG: ICD-10-PCS | Mod: CPTII,S$GLB,, | Performed by: INTERNAL MEDICINE

## 2022-11-14 PROCEDURE — 1160F RVW MEDS BY RX/DR IN RCRD: CPT | Mod: CPTII,S$GLB,, | Performed by: INTERNAL MEDICINE

## 2022-11-14 PROCEDURE — 99499 UNLISTED E&M SERVICE: CPT | Mod: HCNC,S$GLB,, | Performed by: INTERNAL MEDICINE

## 2022-11-14 PROCEDURE — 1100F PR PT FALLS ASSESS DOC 2+ FALLS/FALL W/INJURY/YR: ICD-10-PCS | Mod: CPTII,S$GLB,, | Performed by: INTERNAL MEDICINE

## 2022-11-14 PROCEDURE — 3072F PR LOW RISK FOR RETINOPATHY: ICD-10-PCS | Mod: CPTII,S$GLB,, | Performed by: INTERNAL MEDICINE

## 2022-11-14 PROCEDURE — 99499 RISK ADDL DX/OHS AUDIT: ICD-10-PCS | Mod: HCNC,S$GLB,, | Performed by: INTERNAL MEDICINE

## 2022-11-14 PROCEDURE — 99213 OFFICE O/P EST LOW 20 MIN: CPT | Mod: S$GLB,,, | Performed by: INTERNAL MEDICINE

## 2022-11-14 PROCEDURE — 3288F PR FALLS RISK ASSESSMENT DOCUMENTED: ICD-10-PCS | Mod: CPTII,S$GLB,, | Performed by: INTERNAL MEDICINE

## 2022-11-14 PROCEDURE — 3288F FALL RISK ASSESSMENT DOCD: CPT | Mod: CPTII,S$GLB,, | Performed by: INTERNAL MEDICINE

## 2022-11-14 PROCEDURE — 36415 COLL VENOUS BLD VENIPUNCTURE: CPT | Mod: PO | Performed by: INTERNAL MEDICINE

## 2022-11-14 PROCEDURE — 3074F SYST BP LT 130 MM HG: CPT | Mod: CPTII,S$GLB,, | Performed by: INTERNAL MEDICINE

## 2022-11-14 PROCEDURE — 1159F PR MEDICATION LIST DOCUMENTED IN MEDICAL RECORD: ICD-10-PCS | Mod: CPTII,S$GLB,, | Performed by: INTERNAL MEDICINE

## 2022-11-14 PROCEDURE — 83036 HEMOGLOBIN GLYCOSYLATED A1C: CPT | Performed by: INTERNAL MEDICINE

## 2022-11-14 NOTE — PROGRESS NOTES
"Subjective:       Patient ID: Brianne Martin is a 77 y.o. female.    Chief Complaint: Follow-up (Pt was taken off her insulin. Blood sugar was running high.)    Follow-up   Patient is a 77-year-old female presenting today following up on her diabetes.  She had recent hospitalization for some hypoglycemic issues.  She was taken off of her insulin and has just been taking Trulicity once a week.  As per comes in today with her numbers.  Her fasting numbers are running pretty much between 140 and 160 typically she has had occasional readings get up to the 180s 190 throughout the day.  She has not had any further issues with hypoglycemia.  She is tolerating her medications well.  Her previous A1c was 5.6.    Review of Systems    Objective:   /70   Pulse 83   Temp 97.3 °F (36.3 °C)   Ht 5' 2" (1.575 m)   Wt 65.9 kg (145 lb 4.5 oz)   SpO2 99%   BMI 26.57 kg/m²      Physical Exam  Vitals reviewed.   Constitutional:       General: She is not in acute distress.     Appearance: Normal appearance. She is well-developed. She is not ill-appearing.   HENT:      Head: Normocephalic and atraumatic.      Right Ear: External ear normal.      Left Ear: External ear normal.   Cardiovascular:      Rate and Rhythm: Normal rate and regular rhythm.      Heart sounds: No murmur heard.    No friction rub. No gallop.   Pulmonary:      Effort: Pulmonary effort is normal.      Breath sounds: Normal breath sounds. No wheezing or rales.   Chest:      Chest wall: No tenderness.   Abdominal:      General: Bowel sounds are normal. There is no distension.      Palpations: Abdomen is soft.      Tenderness: There is no abdominal tenderness.   Lymphadenopathy:      Cervical: No cervical adenopathy.   Skin:     Findings: No rash.   Neurological:      General: No focal deficit present.      Mental Status: She is alert and oriented to person, place, and time.      Cranial Nerves: No cranial nerve deficit.       No visits with results within 2 " Week(s) from this visit.   Latest known visit with results is:   Office Visit on 08/30/2022   Component Date Value    POC Glucose 08/30/2022 127 (A)        Assessment:       1. Type 2 diabetes mellitus without complication, without long-term current use of insulin    2. Encounter for screening mammogram for high-risk patient          Plan:   No problem-specific Assessment & Plan notes found for this encounter.    Type 2 diabetes mellitus without complication, without long-term current use of insulin  Comments:  Stay off insulin at this time, Continue trulicity weekly.  update a1c today.  Orders:  -     Hemoglobin A1C; Future; Expected date: 11/14/2022  -     Hemoglobin A1C; Future; Expected date: 01/17/2023  -     Ambulatory referral/consult to Optometry; Future; Expected date: 11/21/2022    Encounter for screening mammogram for high-risk patient  -     Mammo Digital Screening Bilat w/ Luis; Future; Expected date: 11/14/2022    We will continue off of insulin at this time and continue Trulicity is her primary treatment.  The continue to monitor the sugars.  Will do an A1c today to see how she is comparing to her 1 in July and will plan to do an A1c in January as well.  We see pattern towards declining level of control we will look at making adjustments.  I would be more willing to do a higher dose on the Trulicity than to look at making changes in putting back on insulin giving her age and her prior issues with hypoglycemia.      Follow up in about 2 months (around 1/26/2023).

## 2022-11-14 NOTE — PATIENT INSTRUCTIONS
Patient Education       Guide to Eating When You Have Diabetes   About this topic   This guide will help you control your blood sugar along with exercise and the drugs you are taking. You want to have a balanced amount of carbohydrates, fats, and protein in your meal. Following these diet guidelines may also help control your blood pressure and cholesterol.     What will the results be?   When you follow these diet guidelines, your blood sugar may be easier to keep within the goal blood sugar ranges. Ask your doctor for your goal blood sugar ranges.  What changes to diet are needed?   You need to balance how much carbohydrate, fat, and protein is in your meals. You also need to control the amount or portion size of the food you eat. Eat meals at about the same time every day. Do not skip a meal. Talk to your dietitian about making a personal meal plan for you.     Who should use this diet?   This diet is helpful to people with high blood sugar or diabetes.   What foods are good to eat?   Whole grains like:  1/3 cup (80 grams) brown rice  1/3 cup (80 grams) wild rice  1/3 cup (80 grams) whole wheat pasta  1 slice whole wheat or whole grain bread  3/4 cup (180 grams) high-fiber cereal  1/2 cup (120 grams) cooked oatmeal  1/2 (120 grams) English muffin  Fruits and vegetables like:  1/2 cup (120 grams) sweet potatoes  1/2 cup (120 grams) cooked vegetables, like squash, green beans, cauliflower, carrots, and cabbage  1 cup (240 grams) raw vegetables or salad greens  1 small apples or oranges  1/2 cup (120 grams) unsweetened fruit juice  Proteins like:  1 ounce (30 grams) lean beef or pork  1 ounce (30 grams) chicken, skin removed  1 ounce (30 grams) turkey, skin removed  1 ounce (30 grams) fish  1 ounce (30 grams) low-fat cheese or lunch meat  1/2 cup (120 grams) cooked beans ? black, kidney, chickpeas, or lentils  1 whole egg  What foods should be limited or avoided?   High fat or processed foods  like:  Ny  Sausage  Hot dogs  Processed snacks  Fats and oils like:  Margarine  Salad dressings  Foods that are high in salt like:  Table salt  Salted breads, rolls, crackers  Commercially-prepared potatoes and vegetable mixes  Canned vegetables and juices  Canned soups  Smoked, cured, or salted meats  Starches that are not whole grain like:  White rice  White potatoes  French fries  Pasta  White bread  Sugary cereals  Instant oatmeal  Baked goods, pastries  Croissants  What can be done to prevent this health problem?   Type 1 diabetes is a lifelong problem and you cannot prevent it. Type 2 diabetes can be prevented or delayed with lifestyle changes. You can still lead a normal life. Diabetes can be managed through diet, exercise, and drugs. Family members and friends can help you practice good health behaviors.  When do I need to call the doctor?   Blood sugar level is above 240 for more than a day  Blood sugar level drops to less than 40  Abnormal urine test results  Trouble breathing  Very sleepy  Throw up more than once  Many loose stools  Questions about your diet plan  Helpful tips   Try to eat smaller portions of a healthy balanced diet throughout the day. Take time to plan your meals and snacks.  Where can I learn more?   American Diabetes Association  https://www.cdc.gov/diabetes/managing/eat-well/meal-plan-method.html   HelpGuide.org  http://www.helpguide.org/home-pages/healthy-eating.htm   HelpGuide.org  http://www.helpguide.org/articles/diet-weight-loss/diabetes-diet-and-food-tips.htm   Last Reviewed Date   2021-07-21  Consumer Information Use and Disclaimer   This information is not specific medical advice and does not replace information you receive from your health care provider. This is only a brief summary of general information. It does NOT include all information about conditions, illnesses, injuries, tests, procedures, treatments, therapies, discharge instructions or life-style choices that  may apply to you. You must talk with your health care provider for complete information about your health and treatment options. This information should not be used to decide whether or not to accept your health care providers advice, instructions or recommendations. Only your health care provider has the knowledge and training to provide advice that is right for you.   Copyright   Copyright © 2021 [employer] and its affiliates and/or licensors. All rights reserved.

## 2022-11-15 DIAGNOSIS — E11.9 TYPE 2 DIABETES MELLITUS WITHOUT COMPLICATION, WITHOUT LONG-TERM CURRENT USE OF INSULIN: Primary | ICD-10-CM

## 2022-11-15 RX ORDER — DULAGLUTIDE 1.5 MG/.5ML
3 INJECTION, SOLUTION SUBCUTANEOUS
Qty: 24 PEN | Refills: 3 | Status: SHIPPED | OUTPATIENT
Start: 2022-11-15 | End: 2022-11-17 | Stop reason: ALTCHOICE

## 2022-11-17 DIAGNOSIS — E11.9 TYPE 2 DIABETES MELLITUS WITHOUT COMPLICATION, WITHOUT LONG-TERM CURRENT USE OF INSULIN: ICD-10-CM

## 2022-11-17 RX ORDER — DULAGLUTIDE 3 MG/.5ML
3 INJECTION, SOLUTION SUBCUTANEOUS
Qty: 12 PEN | Refills: 3 | Status: SHIPPED | OUTPATIENT
Start: 2022-11-17 | End: 2023-12-01 | Stop reason: SDUPTHER

## 2023-01-11 ENCOUNTER — OFFICE VISIT (OUTPATIENT)
Dept: OPHTHALMOLOGY | Facility: CLINIC | Age: 78
End: 2023-01-11
Payer: MEDICARE

## 2023-01-11 DIAGNOSIS — H40.1134 PRIMARY OPEN ANGLE GLAUCOMA (POAG) OF BOTH EYES, INDETERMINATE STAGE: ICD-10-CM

## 2023-01-11 DIAGNOSIS — Z96.1 PSEUDOPHAKIA, BOTH EYES: ICD-10-CM

## 2023-01-11 DIAGNOSIS — H40.1131 PRIMARY OPEN ANGLE GLAUCOMA (POAG) OF BOTH EYES, MILD STAGE: Primary | ICD-10-CM

## 2023-01-11 DIAGNOSIS — E11.9 DIABETES MELLITUS WITHOUT COMPLICATION: ICD-10-CM

## 2023-01-11 DIAGNOSIS — H52.4 PRESBYOPIA OF BOTH EYES: ICD-10-CM

## 2023-01-11 PROCEDURE — 2023F DILAT RTA XM W/O RTNOPTHY: CPT | Mod: HCNC,CPTII,S$GLB, | Performed by: OPTOMETRIST

## 2023-01-11 PROCEDURE — 92133 CPTRZD OPH DX IMG PST SGM ON: CPT | Mod: HCNC,S$GLB,, | Performed by: OPTOMETRIST

## 2023-01-11 PROCEDURE — 99999 PR PBB SHADOW E&M-EST. PATIENT-LVL III: CPT | Mod: PBBFAC,HCNC,, | Performed by: OPTOMETRIST

## 2023-01-11 PROCEDURE — 99999 PR PBB SHADOW E&M-EST. PATIENT-LVL III: ICD-10-PCS | Mod: PBBFAC,HCNC,, | Performed by: OPTOMETRIST

## 2023-01-11 PROCEDURE — 92014 PR EYE EXAM, EST PATIENT,COMPREHESV: ICD-10-PCS | Mod: HCNC,S$GLB,, | Performed by: OPTOMETRIST

## 2023-01-11 PROCEDURE — 92015 PR REFRACTION: ICD-10-PCS | Mod: HCNC,S$GLB,, | Performed by: OPTOMETRIST

## 2023-01-11 PROCEDURE — 92014 COMPRE OPH EXAM EST PT 1/>: CPT | Mod: HCNC,S$GLB,, | Performed by: OPTOMETRIST

## 2023-01-11 PROCEDURE — 1159F MED LIST DOCD IN RCRD: CPT | Mod: HCNC,CPTII,S$GLB, | Performed by: OPTOMETRIST

## 2023-01-11 PROCEDURE — 1159F PR MEDICATION LIST DOCUMENTED IN MEDICAL RECORD: ICD-10-PCS | Mod: HCNC,CPTII,S$GLB, | Performed by: OPTOMETRIST

## 2023-01-11 PROCEDURE — 2023F PR DILATED RETINAL EXAM W/O EVID OF RETINOPATHY: ICD-10-PCS | Mod: HCNC,CPTII,S$GLB, | Performed by: OPTOMETRIST

## 2023-01-11 PROCEDURE — 92015 DETERMINE REFRACTIVE STATE: CPT | Mod: HCNC,S$GLB,, | Performed by: OPTOMETRIST

## 2023-01-11 PROCEDURE — 92133 OCT, OPTIC NERVE - OU - BOTH EYES: ICD-10-PCS | Mod: HCNC,S$GLB,, | Performed by: OPTOMETRIST

## 2023-01-11 RX ORDER — LATANOPROST 50 UG/ML
1 SOLUTION/ DROPS OPHTHALMIC NIGHTLY
Qty: 2.5 ML | Refills: 3 | Status: SHIPPED | OUTPATIENT
Start: 2023-01-11 | End: 2023-05-15

## 2023-01-11 NOTE — PROGRESS NOTES
HPI     Diabetic Eye Exam            Comments: Yearly          Comments    Patient here today for yearly DM eye exam  Diagnosed with diabetes 2010  Lab Results       Component                Value               Date                       HGBA1C                   6.7 (H)             11/14/2022            Vision changes since last eye exam?: None noticed  Wears OTC readers     Any eye pain today: No    Other ocular symptoms: No    Interested in contact lens fitting today? No    1. DM  2. POAG OU  -Latanoprost QHS OU  3. PCIOL OU          Last edited by Jessica Mohamud, PCT on 1/11/2023  9:29 AM.            Assessment /Plan     For exam results, see Encounter Report.    Primary open angle glaucoma (POAG) of both eyes, mild stage  -     OCT, Optic Nerve - OU - Both Eyes  -     latanoprost (XALATAN) 0.005 % ophthalmic solution; Place 1 drop into both eyes every evening.  Dispense: 2.5 mL; Refill: 3  gOCT stable, continue Latanoprost at bedtime OU. RTC next available for F 24-2.     Pseudophakia, both eyes  S/p CE/IOL, doing well Mrx provided.     Diabetes mellitus without complication   Last A1c 6.7 Stressed importance of DM control to preserve vision. No diabetic retinopathy was seen in either eye today. Continue strict blood glucose control.  Reviewed importance of yearly dilated eye exams. Continue close care with PCP regarding diabetes.    Presbyopia of both eyes  Eyeglass Final Rx       Eyeglass Final Rx         Sphere Cylinder Axis Add    Right -0.50 +0.50 154 +2.50    Left -0.75 DS  +2.50      Type: PAL    Expiration Date: 1/11/2024                      RTC next available for F 24-2 with IOP check, sooner if any changes to vision or worsening symptoms.

## 2023-01-19 ENCOUNTER — HOSPITAL ENCOUNTER (OUTPATIENT)
Dept: RADIOLOGY | Facility: HOSPITAL | Age: 78
Discharge: HOME | End: 2023-01-19
Attending: INTERNAL MEDICINE
Payer: MEDICARE

## 2023-01-19 DIAGNOSIS — Z12.31 ENCOUNTER FOR SCREENING MAMMOGRAM FOR HIGH-RISK PATIENT: ICD-10-CM

## 2023-01-19 PROCEDURE — 77067 SCR MAMMO BI INCL CAD: CPT | Mod: 26,HCNC,, | Performed by: RADIOLOGY

## 2023-01-19 PROCEDURE — 77067 SCR MAMMO BI INCL CAD: CPT | Mod: TC,HCNC,PN

## 2023-01-19 PROCEDURE — 77063 MAMMO DIGITAL SCREENING BILAT WITH TOMO: ICD-10-PCS | Mod: 26,HCNC,, | Performed by: RADIOLOGY

## 2023-01-19 PROCEDURE — 77063 BREAST TOMOSYNTHESIS BI: CPT | Mod: 26,HCNC,, | Performed by: RADIOLOGY

## 2023-01-19 PROCEDURE — 77067 MAMMO DIGITAL SCREENING BILAT WITH TOMO: ICD-10-PCS | Mod: 26,HCNC,, | Performed by: RADIOLOGY

## 2023-01-25 ENCOUNTER — OFFICE VISIT (OUTPATIENT)
Dept: PRIMARY CARE CLINIC | Facility: CLINIC | Age: 78
End: 2023-01-25
Payer: MEDICARE

## 2023-01-25 VITALS
HEART RATE: 89 BPM | BODY MASS INDEX: 26.25 KG/M2 | TEMPERATURE: 98 F | HEIGHT: 62 IN | SYSTOLIC BLOOD PRESSURE: 122 MMHG | DIASTOLIC BLOOD PRESSURE: 70 MMHG | WEIGHT: 142.63 LBS

## 2023-01-25 DIAGNOSIS — E78.5 HYPERLIPIDEMIA ASSOCIATED WITH TYPE 2 DIABETES MELLITUS: ICD-10-CM

## 2023-01-25 DIAGNOSIS — E11.65 TYPE 2 DIABETES MELLITUS WITH HYPERGLYCEMIA, WITH LONG-TERM CURRENT USE OF INSULIN: Primary | ICD-10-CM

## 2023-01-25 DIAGNOSIS — E11.59 HYPERTENSION ASSOCIATED WITH DIABETES: ICD-10-CM

## 2023-01-25 DIAGNOSIS — I15.2 HYPERTENSION ASSOCIATED WITH DIABETES: ICD-10-CM

## 2023-01-25 DIAGNOSIS — Z79.4 TYPE 2 DIABETES MELLITUS WITH HYPERGLYCEMIA, WITH LONG-TERM CURRENT USE OF INSULIN: Primary | ICD-10-CM

## 2023-01-25 DIAGNOSIS — E11.69 HYPERLIPIDEMIA ASSOCIATED WITH TYPE 2 DIABETES MELLITUS: ICD-10-CM

## 2023-01-25 PROCEDURE — 1126F PR PAIN SEVERITY QUANTIFIED, NO PAIN PRESENT: ICD-10-PCS | Mod: HCNC,CPTII,S$GLB, | Performed by: INTERNAL MEDICINE

## 2023-01-25 PROCEDURE — 99999 PR PBB SHADOW E&M-EST. PATIENT-LVL III: ICD-10-PCS | Mod: PBBFAC,HCNC,, | Performed by: INTERNAL MEDICINE

## 2023-01-25 PROCEDURE — 1159F PR MEDICATION LIST DOCUMENTED IN MEDICAL RECORD: ICD-10-PCS | Mod: HCNC,CPTII,S$GLB, | Performed by: INTERNAL MEDICINE

## 2023-01-25 PROCEDURE — 99214 OFFICE O/P EST MOD 30 MIN: CPT | Mod: HCNC,S$GLB,, | Performed by: INTERNAL MEDICINE

## 2023-01-25 PROCEDURE — 99999 PR PBB SHADOW E&M-EST. PATIENT-LVL III: CPT | Mod: PBBFAC,HCNC,, | Performed by: INTERNAL MEDICINE

## 2023-01-25 PROCEDURE — 3288F FALL RISK ASSESSMENT DOCD: CPT | Mod: HCNC,CPTII,S$GLB, | Performed by: INTERNAL MEDICINE

## 2023-01-25 PROCEDURE — 3074F SYST BP LT 130 MM HG: CPT | Mod: HCNC,CPTII,S$GLB, | Performed by: INTERNAL MEDICINE

## 2023-01-25 PROCEDURE — 1160F PR REVIEW ALL MEDS BY PRESCRIBER/CLIN PHARMACIST DOCUMENTED: ICD-10-PCS | Mod: HCNC,CPTII,S$GLB, | Performed by: INTERNAL MEDICINE

## 2023-01-25 PROCEDURE — 3288F PR FALLS RISK ASSESSMENT DOCUMENTED: ICD-10-PCS | Mod: HCNC,CPTII,S$GLB, | Performed by: INTERNAL MEDICINE

## 2023-01-25 PROCEDURE — 3074F PR MOST RECENT SYSTOLIC BLOOD PRESSURE < 130 MM HG: ICD-10-PCS | Mod: HCNC,CPTII,S$GLB, | Performed by: INTERNAL MEDICINE

## 2023-01-25 PROCEDURE — 1159F MED LIST DOCD IN RCRD: CPT | Mod: HCNC,CPTII,S$GLB, | Performed by: INTERNAL MEDICINE

## 2023-01-25 PROCEDURE — 99214 PR OFFICE/OUTPT VISIT, EST, LEVL IV, 30-39 MIN: ICD-10-PCS | Mod: HCNC,S$GLB,, | Performed by: INTERNAL MEDICINE

## 2023-01-25 PROCEDURE — 3078F DIAST BP <80 MM HG: CPT | Mod: HCNC,CPTII,S$GLB, | Performed by: INTERNAL MEDICINE

## 2023-01-25 PROCEDURE — 1101F PR PT FALLS ASSESS DOC 0-1 FALLS W/OUT INJ PAST YR: ICD-10-PCS | Mod: HCNC,CPTII,S$GLB, | Performed by: INTERNAL MEDICINE

## 2023-01-25 PROCEDURE — 1101F PT FALLS ASSESS-DOCD LE1/YR: CPT | Mod: HCNC,CPTII,S$GLB, | Performed by: INTERNAL MEDICINE

## 2023-01-25 PROCEDURE — 1160F RVW MEDS BY RX/DR IN RCRD: CPT | Mod: HCNC,CPTII,S$GLB, | Performed by: INTERNAL MEDICINE

## 2023-01-25 PROCEDURE — 1126F AMNT PAIN NOTED NONE PRSNT: CPT | Mod: HCNC,CPTII,S$GLB, | Performed by: INTERNAL MEDICINE

## 2023-01-25 PROCEDURE — 3078F PR MOST RECENT DIASTOLIC BLOOD PRESSURE < 80 MM HG: ICD-10-PCS | Mod: HCNC,CPTII,S$GLB, | Performed by: INTERNAL MEDICINE

## 2023-01-25 RX ORDER — INSULIN PUMP SYRINGE, 3 ML
EACH MISCELLANEOUS
Qty: 1 EACH | Refills: 0 | Status: SHIPPED | OUTPATIENT
Start: 2023-01-25 | End: 2023-03-04 | Stop reason: SDUPTHER

## 2023-01-25 RX ORDER — LANCETS
EACH MISCELLANEOUS
Qty: 100 EACH | Refills: 3 | Status: SHIPPED | OUTPATIENT
Start: 2023-01-25 | End: 2023-05-15

## 2023-01-25 NOTE — PATIENT INSTRUCTIONS
Patient Education       Guide to Eating When You Have Diabetes   About this topic   This guide will help you control your blood sugar along with exercise and the drugs you are taking. You want to have a balanced amount of carbohydrates, fats, and protein in your meal. Following these diet guidelines may also help control your blood pressure and cholesterol.     What will the results be?   When you follow these diet guidelines, your blood sugar may be easier to keep within the goal blood sugar ranges. Ask your doctor for your goal blood sugar ranges.  What changes to diet are needed?   You need to balance how much carbohydrate, fat, and protein is in your meals. You also need to control the amount or portion size of the food you eat. Eat meals at about the same time every day. Do not skip a meal. Talk to your dietitian about making a personal meal plan for you.     Who should use this diet?   This diet is helpful to people with high blood sugar or diabetes.   What foods are good to eat?   Whole grains like:  1/3 cup (80 grams) brown rice  1/3 cup (80 grams) wild rice  1/3 cup (80 grams) whole wheat pasta  1 slice whole wheat or whole grain bread  3/4 cup (180 grams) high-fiber cereal  1/2 cup (120 grams) cooked oatmeal  1/2 (120 grams) English muffin  Fruits and vegetables like:  1/2 cup (120 grams) sweet potatoes  1/2 cup (120 grams) cooked vegetables, like squash, green beans, cauliflower, carrots, and cabbage  1 cup (240 grams) raw vegetables or salad greens  1 small apples or oranges  1/2 cup (120 grams) unsweetened fruit juice  Proteins like:  1 ounce (30 grams) lean beef or pork  1 ounce (30 grams) chicken, skin removed  1 ounce (30 grams) turkey, skin removed  1 ounce (30 grams) fish  1 ounce (30 grams) low-fat cheese or lunch meat  1/2 cup (120 grams) cooked beans ? black, kidney, chickpeas, or lentils  1 whole egg  What foods should be limited or avoided?   High fat or processed foods  like:  Ny  Sausage  Hot dogs  Processed snacks  Fats and oils like:  Margarine  Salad dressings  Foods that are high in salt like:  Table salt  Salted breads, rolls, crackers  Commercially-prepared potatoes and vegetable mixes  Canned vegetables and juices  Canned soups  Smoked, cured, or salted meats  Starches that are not whole grain like:  White rice  White potatoes  French fries  Pasta  White bread  Sugary cereals  Instant oatmeal  Baked goods, pastries  Croissants  What can be done to prevent this health problem?   Type 1 diabetes is a lifelong problem and you cannot prevent it. Type 2 diabetes can be prevented or delayed with lifestyle changes. You can still lead a normal life. Diabetes can be managed through diet, exercise, and drugs. Family members and friends can help you practice good health behaviors.  When do I need to call the doctor?   Blood sugar level is above 240 for more than a day  Blood sugar level drops to less than 40  Abnormal urine test results  Trouble breathing  Very sleepy  Throw up more than once  Many loose stools  Questions about your diet plan  Helpful tips   Try to eat smaller portions of a healthy balanced diet throughout the day. Take time to plan your meals and snacks.  Where can I learn more?   American Diabetes Association  https://www.cdc.gov/diabetes/managing/eat-well/meal-plan-method.html   HelpGuide.org  http://www.helpguide.org/home-pages/healthy-eating.htm   HelpGuide.org  http://www.helpguide.org/articles/diet-weight-loss/diabetes-diet-and-food-tips.htm   Last Reviewed Date   2021-07-21  Consumer Information Use and Disclaimer   This information is not specific medical advice and does not replace information you receive from your health care provider. This is only a brief summary of general information. It does NOT include all information about conditions, illnesses, injuries, tests, procedures, treatments, therapies, discharge instructions or life-style choices that  may apply to you. You must talk with your health care provider for complete information about your health and treatment options. This information should not be used to decide whether or not to accept your health care providers advice, instructions or recommendations. Only your health care provider has the knowledge and training to provide advice that is right for you.   Copyright   Copyright © 2021 [employer] and its affiliates and/or licensors. All rights reserved.

## 2023-01-25 NOTE — PROGRESS NOTES
"Subjective:       Patient ID: Brianne Martin is a 78 y.o. female.    Chief Complaint: Follow-up (Need refill on test strips)     Patient is a 78-year-old female presenting today following up on her diabetes, hypertension, hyperlipidemia.  In regards to her diabetes her A1c is crept up a little bit to 7.3.  She had a hospitalization awhile back for hypoglycemia so we will loud her sugars to be little bit more elevated just avoid more hypoglycemia age.  She feels like she has been doing well she has not had any problems with hypoglycemia since she has been tolerating the regimen well.  She requests prescriptions for a new meter and strips and such sent her mail order pharmacy.      Her blood pressure is stable at this time she is doing well with it she is tolerating medicine without adverse effects.  She denies any chest pain or palpitations.      Cholesterol has been stable.  She was on statin therapy in the past but did not do well with it she is now on Praluent and is tolerating it.    Review of Systems   Constitutional:  Negative for chills and fever.   Respiratory:  Negative for cough, shortness of breath and wheezing.    Cardiovascular:  Negative for chest pain and palpitations.   Gastrointestinal:  Negative for blood in stool, constipation, nausea and vomiting.   Genitourinary:  Negative for dysuria and hematuria.   Musculoskeletal:  Positive for arthralgias.   Skin:  Negative for rash.     Objective:   /70   Pulse 89   Temp 97.8 °F (36.6 °C)   Ht 5' 2" (1.575 m)   Wt 64.7 kg (142 lb 10.2 oz)   BMI 26.09 kg/m²      Physical Exam  Vitals reviewed.   Constitutional:       General: She is not in acute distress.     Appearance: Normal appearance. She is well-developed. She is not ill-appearing.   HENT:      Head: Normocephalic and atraumatic.      Right Ear: External ear normal.      Left Ear: External ear normal.   Cardiovascular:      Rate and Rhythm: Normal rate and regular rhythm.      Heart sounds: " No murmur heard.    No friction rub. No gallop.   Pulmonary:      Effort: Pulmonary effort is normal.      Breath sounds: Normal breath sounds. No wheezing or rales.   Chest:      Chest wall: No tenderness.   Abdominal:      General: Bowel sounds are normal. There is no distension.      Palpations: Abdomen is soft.      Tenderness: There is no abdominal tenderness.   Lymphadenopathy:      Cervical: No cervical adenopathy.   Skin:     Findings: No rash.   Neurological:      General: No focal deficit present.      Mental Status: She is alert and oriented to person, place, and time.      Cranial Nerves: No cranial nerve deficit.       Lab Visit on 01/19/2023   Component Date Value    Hemoglobin A1C 01/19/2023 7.3 (H)     Estimated Avg Glucose 01/19/2023 163 (H)        Assessment:       1. Type 2 diabetes mellitus with hyperglycemia, with long-term current use of insulin    2. Hypertension associated with diabetes    3. Hyperlipidemia associated with type 2 diabetes mellitus          Plan:   No problem-specific Assessment & Plan notes found for this encounter.    Type 2 diabetes mellitus with hyperglycemia, with long-term current use of insulin  Comments:  Numbers are a little high.  a1c up to 7.3.  No hypoglycemia. COntinue current meds, limit carbs in diet.  Orders:  -     blood-glucose meter kit; To check BG 3 times daily, to use with insurance preferred meter  Dispense: 1 each; Refill: 0  -     blood sugar diagnostic Strp; To check BG 3 times daily, to use with insurance preferred meter. Patient is on insulin  Dispense: 100 strip; Refill: 3  -     lancets Misc; To check BG 3 times daily, to use with insurance preferred meter. Patient is on insulin  Dispense: 100 each; Refill: 3  -     Hemoglobin A1C; Future; Expected date: 04/20/2023    Hypertension associated with diabetes  Comments:  Continue current meds, stable.    Hyperlipidemia associated with type 2 diabetes mellitus  Comments:  Continue praluent.  Intolerant  of statin.          Follow up in about 3 months (around 4/25/2023) for DM, HTN, with Tom Johnson

## 2023-02-09 DIAGNOSIS — Z00.00 ENCOUNTER FOR MEDICARE ANNUAL WELLNESS EXAM: ICD-10-CM

## 2023-02-20 ENCOUNTER — OFFICE VISIT (OUTPATIENT)
Dept: RHEUMATOLOGY | Facility: CLINIC | Age: 78
End: 2023-02-20
Payer: MEDICARE

## 2023-02-20 ENCOUNTER — INFUSION (OUTPATIENT)
Dept: INFUSION THERAPY | Facility: HOSPITAL | Age: 78
End: 2023-02-20
Attending: INTERNAL MEDICINE
Payer: MEDICARE

## 2023-02-20 VITALS
OXYGEN SATURATION: 98 % | TEMPERATURE: 98 F | SYSTOLIC BLOOD PRESSURE: 168 MMHG | RESPIRATION RATE: 16 BRPM | DIASTOLIC BLOOD PRESSURE: 78 MMHG | HEART RATE: 85 BPM

## 2023-02-20 DIAGNOSIS — M81.0 OSTEOPOROSIS, POSTMENOPAUSAL: Primary | ICD-10-CM

## 2023-02-20 DIAGNOSIS — Z51.81 MEDICATION MONITORING ENCOUNTER: ICD-10-CM

## 2023-02-20 PROCEDURE — 1159F PR MEDICATION LIST DOCUMENTED IN MEDICAL RECORD: ICD-10-PCS | Mod: HCNC,CPTII,S$GLB, | Performed by: PHYSICIAN ASSISTANT

## 2023-02-20 PROCEDURE — 1160F PR REVIEW ALL MEDS BY PRESCRIBER/CLIN PHARMACIST DOCUMENTED: ICD-10-PCS | Mod: HCNC,CPTII,S$GLB, | Performed by: PHYSICIAN ASSISTANT

## 2023-02-20 PROCEDURE — 99214 PR OFFICE/OUTPT VISIT, EST, LEVL IV, 30-39 MIN: ICD-10-PCS | Mod: HCNC,S$GLB,, | Performed by: PHYSICIAN ASSISTANT

## 2023-02-20 PROCEDURE — 1160F RVW MEDS BY RX/DR IN RCRD: CPT | Mod: HCNC,CPTII,S$GLB, | Performed by: PHYSICIAN ASSISTANT

## 2023-02-20 PROCEDURE — 63600175 PHARM REV CODE 636 W HCPCS: Mod: JG,HCNC | Performed by: INTERNAL MEDICINE

## 2023-02-20 PROCEDURE — 1159F MED LIST DOCD IN RCRD: CPT | Mod: HCNC,CPTII,S$GLB, | Performed by: PHYSICIAN ASSISTANT

## 2023-02-20 PROCEDURE — 99999 PR PBB SHADOW E&M-EST. PATIENT-LVL III: CPT | Mod: PBBFAC,HCNC,, | Performed by: PHYSICIAN ASSISTANT

## 2023-02-20 PROCEDURE — 99214 OFFICE O/P EST MOD 30 MIN: CPT | Mod: HCNC,S$GLB,, | Performed by: PHYSICIAN ASSISTANT

## 2023-02-20 PROCEDURE — 96372 THER/PROPH/DIAG INJ SC/IM: CPT | Mod: HCNC

## 2023-02-20 PROCEDURE — 99999 PR PBB SHADOW E&M-EST. PATIENT-LVL III: ICD-10-PCS | Mod: PBBFAC,HCNC,, | Performed by: PHYSICIAN ASSISTANT

## 2023-02-20 RX ADMIN — DENOSUMAB 60 MG: 60 INJECTION SUBCUTANEOUS at 10:02

## 2023-02-20 NOTE — PROGRESS NOTES
Subjective:       Patient ID: Brianne Martin is a 78 y.o. female.    Chief Complaint: No chief complaint on file.    Brianne Martin  is a 78 y.o. female who was established in the department, but a new patient to my clinic.  She has osteoporosis.  Previously has seen Dr. Briggs.  Last visit was 6 months ago.  Presents to the office for Prolia today.  No falls or fractures since last office visit.  She tolerates Prolia well.  Previously was on oral bisphosphonates.  Takes over-the-counter calcium and vitamin-D supplementation.  Pt unclear as to how much she takes of either supplement.  No recent invasive dental procedures.  She has no upcoming invasive dental procedures planned.  She has steady gait.  Currently on Prolia for heartburn.  It is well-controlled.      Current Tx:   Prolia - due today  OTC calcium one per day per report  OTC Vit D  Previous Tx:  Fosamax  GERD: yes - controlled on prilosec   Gait:  steady  Dental:  none recent and none planned  History of Fragility fracture: no    Rheumatologic systems otherwise negative.      Current Outpatient Medications:     alendronate (FOSAMAX) 70 MG tablet, TAKE 1 CAPSULE EVERY WEEK (MEDICINE AS VITAMIN, Disp: , Rfl:     alirocumab (PRALUENT PEN) 150 mg/mL PnIj, Inject 1 mL (150 mg total) into the skin every 14 (fourteen) days., Disp: 6 mL, Rfl: 3    amLODIPine (NORVASC) 10 MG tablet, Take 1 tablet (10 mg total) by mouth every morning., Disp: 90 tablet, Rfl: 2    aspirin (ECOTRIN) 81 MG EC tablet, , Disp: , Rfl:     blood sugar diagnostic Strp, To check BG 3 times daily, to use with insurance preferred meter. Patient is on insulin, Disp: 100 strip, Rfl: 3    blood-glucose meter kit, To check BG 3 times daily, to use with insurance preferred meter, Disp: 1 each, Rfl: 0    cholecalciferol, vitamin D3, 125 mcg (5,000 unit) capsule, Take 1 capsule (5,000 Units total) by mouth once daily., Disp: 90 capsule, Rfl: 3    clopidogreL (PLAVIX) 75 mg tablet, Take 1 tablet (75  "mg total) by mouth once daily., Disp: 90 tablet, Rfl: 2    cycloSPORINE (RESTASIS) 0.05 % ophthalmic emulsion, Place 1 drop into both eyes 2 (two) times daily., Disp: 180 vial, Rfl: 2    denosumab (PROLIA) 60 mg/mL Syrg, Inject 1 mL (60 mg total) into the skin every 6 (six) months., Disp: 2 mL, Rfl: 0    dulaglutide (TRULICITY) 3 mg/0.5 mL pen injector, Inject 3 mg into the skin every 7 days., Disp: 12 pen, Rfl: 3    fluconazole (DIFLUCAN) 150 MG Tab, , Disp: , Rfl:     fluticasone propionate (FLONASE) 50 mcg/actuation nasal spray, 1 spray (50 mcg total) by Each Nostril route once daily., Disp: 16 g, Rfl: 11    gabapentin (NEURONTIN) 300 MG capsule, Take 1 capsule (300 mg total) by mouth 2 (two) times daily., Disp: 180 capsule, Rfl: 3    glucagon (GLUCAGON, HUMAN RECOMBINANT,) 1 mg Kit, Inject 1 mg into the muscle as needed., Disp: , Rfl:     insulin needles, disposable, (BD INSULIN PEN NEEDLE UF MINI) 31 x 3/16 " Ndle, Inject 1 Units into the skin every evening., Disp: 100 each, Rfl: 3    lancets Misc, To check BG 3 times daily, to use with insurance preferred meter. Patient is on insulin, Disp: 100 each, Rfl: 3    latanoprost (XALATAN) 0.005 % ophthalmic solution, Place 1 drop into both eyes every evening., Disp: 2.5 mL, Rfl: 3    levocetirizine (XYZAL) 5 MG tablet, Take 1 tablet (5 mg total) by mouth every evening., Disp: 30 tablet, Rfl: 11    losartan (COZAAR) 100 MG tablet, TAKE ONE TABLET EVERY DAY (FOR BLOOD PRESSURE) Strength: 100 mg, Disp: 90 tablet, Rfl: 2    meloxicam (MOBIC) 7.5 MG tablet, TAKE ONE TABLET EVERY DAY (MEDICINE PAIN), Disp: , Rfl:     metFORMIN (GLUCOPHAGE) 1000 MG tablet, Take 1 tablet (1,000 mg total) by mouth 2 (two) times daily with meals., Disp: 180 tablet, Rfl: 3    multivitamin Liqd, Take by mouth., Disp: , Rfl:     NOVOLOG FLEXPEN U-100 INSULIN 100 unit/mL (3 mL) InPn pen, INJECT 20 UNITS INTO THE SKIN 3 TIMES DAILY WITH MEALS, Disp: 60 mL, Rfl: 0    olopatadine (PAZEO) 0.7 % Drop, " Place 1 drop into both eyes every morning., Disp: 5 mL, Rfl: 3    omega-3 acid ethyl esters (LOVAZA) 1 gram capsule, Take 2 capsules (2 g total) by mouth 2 (two) times daily., Disp: 360 capsule, Rfl: 1    omega-3 fatty acids/fish oil (FISH OIL-OMEGA-3 FATTY ACIDS) 300-1,000 mg capsule, Take 2 g by mouth., Disp: , Rfl:     omeprazole (PRILOSEC) 40 MG capsule, Take 1 capsule (40 mg total) by mouth every morning., Disp: 30 capsule, Rfl: 2    valACYclovir (VALTREX) 1000 MG tablet, Take 1,000 mg by mouth 3 (three) times daily., Disp: , Rfl:     venlafaxine (EFFEXOR) 75 MG tablet, TAKE 1 TABLET TWICE DAILY, Disp: 180 tablet, Rfl: 2    VITAMIN D2 1,250 mcg (50,000 unit) capsule, TAKE 1 CAPSULE EVERY WEEK (MEDICINE AS VITAMIN, Disp: , Rfl:     white petrolatum-mineral oil 57.3-42.5% (REFRESH P.M.) 57.3-42.5 % Oint, Place into the left eye every evening., Disp: 3.5 g, Rfl: 2  No current facility-administered medications for this visit.  Past Medical History:   Diagnosis Date    Arthritis 12/10/2014    Bell's palsy 02/03/2022    Elevated LFTs 2012    negative workup    Hyperlipidemia LDL goal <100     Hypertension     Mixed anxiety and depressive disorder     Osteoporosis, postmenopausal     Overweight(278.02)     Reflux     Traumatic closed displaced bimalleolar fracture of right ankle 12/25/2013    Type II or unspecified type diabetes mellitus without mention of complication, uncontrolled     Vitamin D deficiency disease      Family History   Problem Relation Age of Onset    Hypertension Other     Diabetes Other     Cataracts Father     No Known Problems Mother     No Known Problems Sister     No Known Problems Brother     No Known Problems Maternal Aunt     No Known Problems Maternal Uncle     No Known Problems Paternal Aunt     No Known Problems Paternal Uncle     No Known Problems Maternal Grandmother     No Known Problems Maternal Grandfather     No Known Problems Paternal Grandmother     No Known Problems Paternal  Grandfather     Amblyopia Neg Hx     Blindness Neg Hx     Cancer Neg Hx     Glaucoma Neg Hx     Macular degeneration Neg Hx     Retinal detachment Neg Hx     Strabismus Neg Hx     Stroke Neg Hx     Thyroid disease Neg Hx      Social History     Socioeconomic History    Marital status:     Number of children: 8   Occupational History    Occupation: Retired   Tobacco Use    Smoking status: Never    Smokeless tobacco: Never   Substance and Sexual Activity    Alcohol use: Yes     Comment: socially    Drug use: Never   Social History Narrative    1 child  at age 7 of an illness.     Review of patient's allergies indicates:   Allergen Reactions    Ace inhibitors      Other reaction(s): cough       Objective:   There were no vitals taken for this visit.  Immunization History   Administered Date(s) Administered    COVID-19, MRNA, LN-S, PF (MODERNA FULL 0.5 ML DOSE) 2021, 2021, 10/25/2021    COVID-19, MRNA, LN-S, PF (Pfizer) (Gray Cap) 2022    COVID-19, MRNA, LN-S, PF (Pfizer) (Purple Cap) 2021, 2021, 10/01/2021    COVID-19, mRNA, LNP-S, bivalent booster, PF (Moderna Omicron) 2022    Influenza (FLUAD) - Quadrivalent - Adjuvanted - PF *Preferred* (65+) 2020    Influenza - High Dose - PF (65 years and older) 2013, 12/10/2014, 10/18/2015    Influenza - Quadrivalent - High Dose - PF (65 years and older) 2020, 2021, 2022    Influenza - Quadrivalent - PF *Preferred* (6 months and older) 10/19/2012, 2013, 10/30/2013    Influenza - Trivalent (ADULT) 10/19/2009, 10/25/2010, 10/19/2011, 10/19/2012    Influenza - Trivalent - PF (ADULT) 2013    Influenza Split 10/30/2013    Pneumococcal Conjugate - 13 Valent 2015, 2021    Pneumococcal Polysaccharide - 23 Valent 2010    Tdap 2013, 2015    Zoster 10/18/2015    Zoster Recombinant 2020, 2020       Physical Exam   Constitutional: She is oriented to person,  place, and time. She appears well-developed and well-nourished.   HENT:   Head: Normocephalic and atraumatic.   Mouth/Throat: Mucous membranes are normal. No oral lesions. No dental abscesses.   Pulmonary/Chest: Effort normal.   Neurological: She is alert and oriented to person, place, and time.   Skin: Skin is warm and dry. No rash noted.   Psychiatric: Her behavior is normal.   Nursing note and vitals reviewed.       Recent Results (from the past 336 hour(s))   Comprehensive Metabolic Panel    Collection Time: 02/20/23  8:50 AM   Result Value Ref Range    Sodium 143 136 - 145 mmol/L    Potassium 4.3 3.5 - 5.1 mmol/L    Chloride 109 95 - 110 mmol/L    CO2 24 23 - 29 mmol/L    Glucose 137 (H) 70 - 110 mg/dL    BUN 17 8 - 23 mg/dL    Creatinine 0.7 0.5 - 1.4 mg/dL    Calcium 9.4 8.7 - 10.5 mg/dL    Total Protein 6.8 6.0 - 8.4 g/dL    Albumin 3.9 3.5 - 5.2 g/dL    Total Bilirubin 0.4 0.1 - 1.0 mg/dL    Alkaline Phosphatase 63 55 - 135 U/L    AST 23 10 - 40 U/L    ALT 18 10 - 44 U/L    Anion Gap 10 8 - 16 mmol/L    eGFR >60 >60 mL/min/1.73 m^2       No results found for: TBGOLDPLUS   Lab Results   Component Value Date    HEPAIGM Negative 02/19/2013    HEPBIGM Negative 02/19/2013    HEPCAB Negative 02/19/2013        DEXA - I have personally reviewed results from 2/7/22   FINDINGS:  The L1 to L4 vertebral bone mineral density is equal to 0.814 g/cm squared with a T score of -3.1.  There has been interval increase in the lumbar spine bone mineral density compared to the prior exam where it previously measured 0.658 g/cm squared.     The left femoral neck bone mineral density is equal to 0.697 g/cm squared with a T score of -2.5.  There has been interval increase in the left femoral neck bone mineral density compared to the prior exam where it previously measured 0.486 g/cm squared.     The total hip bone mineral density is equal to 0.882 g/cm squared with a T score of -1.0.  There has been interval increase in the total  left hip bone mineral density compared to the prior exam where it previously measured 0.659 g/cm squared.     Impression:  Osteoporosis      Assessment:     1. Osteoporosis, postmenopausal    2. Medication monitoring encounter            Plan:     Diagnoses and all orders for this visit:    Osteoporosis, postmenopausal  -     Comprehensive Metabolic Panel; Standing  -     Vitamin D; Standing    Medication monitoring encounter        Osteoporosis  Calcium 9.4 - WNL  GFR > 60  No contra indication to Prolia today  Check CMP in 10-14 days w Vit D  Will review need for Vit D omce I review updated lab result  In past it has been elevated  Patient instructed to notify the office if he/she has any new falls or new fractures  Discussed risks associated w Prolia including but not limited to hypocalcemia, ONJ, AFF  DEXA due after 2/7/2024  Return to clinic: 6mos - CMP 1 week prior; Dexa same day as f/u if due    Follow up in about 6 months (around 8/20/2023).    The patient understands, chooses and consents to this plan and accepts all the risks which include but are not limited to the risks mentioned above.     Disclaimer: This note was prepared using a voice recognition system and is likely to have sound alike errors within the text.

## 2023-04-03 ENCOUNTER — PATIENT MESSAGE (OUTPATIENT)
Dept: PRIMARY CARE CLINIC | Facility: CLINIC | Age: 78
End: 2023-04-03
Payer: MEDICARE

## 2023-04-10 ENCOUNTER — OFFICE VISIT (OUTPATIENT)
Dept: INTERNAL MEDICINE | Facility: CLINIC | Age: 78
End: 2023-04-10
Payer: MEDICARE

## 2023-04-10 ENCOUNTER — LAB VISIT (OUTPATIENT)
Dept: LAB | Facility: HOSPITAL | Age: 78
End: 2023-04-10
Attending: PHYSICIAN ASSISTANT
Payer: MEDICARE

## 2023-04-10 VITALS
WEIGHT: 140.88 LBS | BODY MASS INDEX: 25.92 KG/M2 | HEART RATE: 96 BPM | HEIGHT: 62 IN | DIASTOLIC BLOOD PRESSURE: 76 MMHG | SYSTOLIC BLOOD PRESSURE: 158 MMHG | OXYGEN SATURATION: 96 % | TEMPERATURE: 97 F

## 2023-04-10 DIAGNOSIS — M81.0 OSTEOPOROSIS, POSTMENOPAUSAL: ICD-10-CM

## 2023-04-10 DIAGNOSIS — N64.4 BREAST PAIN, LEFT: Primary | ICD-10-CM

## 2023-04-10 DIAGNOSIS — N39.0 FREQUENT UTI: ICD-10-CM

## 2023-04-10 DIAGNOSIS — I15.2 HYPERTENSION ASSOCIATED WITH DIABETES: ICD-10-CM

## 2023-04-10 DIAGNOSIS — N63.22 LUMP IN UPPER INNER QUADRANT OF LEFT BREAST: ICD-10-CM

## 2023-04-10 DIAGNOSIS — E78.5 HYPERLIPIDEMIA ASSOCIATED WITH TYPE 2 DIABETES MELLITUS: ICD-10-CM

## 2023-04-10 DIAGNOSIS — E11.9 TYPE 2 DIABETES MELLITUS WITHOUT COMPLICATION, WITHOUT LONG-TERM CURRENT USE OF INSULIN: ICD-10-CM

## 2023-04-10 DIAGNOSIS — E11.59 HYPERTENSION ASSOCIATED WITH DIABETES: ICD-10-CM

## 2023-04-10 DIAGNOSIS — E11.69 HYPERLIPIDEMIA ASSOCIATED WITH TYPE 2 DIABETES MELLITUS: ICD-10-CM

## 2023-04-10 LAB
ALBUMIN SERPL BCP-MCNC: 4 G/DL (ref 3.5–5.2)
ALP SERPL-CCNC: 59 U/L (ref 55–135)
ALT SERPL W/O P-5'-P-CCNC: 23 U/L (ref 10–44)
ANION GAP SERPL CALC-SCNC: 13 MMOL/L (ref 8–16)
AST SERPL-CCNC: 31 U/L (ref 10–40)
BILIRUB SERPL-MCNC: 0.3 MG/DL (ref 0.1–1)
BUN SERPL-MCNC: 15 MG/DL (ref 8–23)
CALCIUM SERPL-MCNC: 9.8 MG/DL (ref 8.7–10.5)
CHLORIDE SERPL-SCNC: 104 MMOL/L (ref 95–110)
CO2 SERPL-SCNC: 25 MMOL/L (ref 23–29)
CREAT SERPL-MCNC: 0.7 MG/DL (ref 0.5–1.4)
EST. GFR  (NO RACE VARIABLE): >60 ML/MIN/1.73 M^2
GLUCOSE SERPL-MCNC: 115 MG/DL (ref 70–110)
POTASSIUM SERPL-SCNC: 4.4 MMOL/L (ref 3.5–5.1)
PROT SERPL-MCNC: 7.2 G/DL (ref 6–8.4)
SODIUM SERPL-SCNC: 142 MMOL/L (ref 136–145)

## 2023-04-10 PROCEDURE — 1101F PR PT FALLS ASSESS DOC 0-1 FALLS W/OUT INJ PAST YR: ICD-10-PCS | Mod: HCNC,CPTII,S$GLB, | Performed by: NURSE PRACTITIONER

## 2023-04-10 PROCEDURE — 3078F PR MOST RECENT DIASTOLIC BLOOD PRESSURE < 80 MM HG: ICD-10-PCS | Mod: HCNC,CPTII,S$GLB, | Performed by: NURSE PRACTITIONER

## 2023-04-10 PROCEDURE — 3077F PR MOST RECENT SYSTOLIC BLOOD PRESSURE >= 140 MM HG: ICD-10-PCS | Mod: HCNC,CPTII,S$GLB, | Performed by: NURSE PRACTITIONER

## 2023-04-10 PROCEDURE — 99999 PR PBB SHADOW E&M-EST. PATIENT-LVL IV: ICD-10-PCS | Mod: PBBFAC,HCNC,, | Performed by: NURSE PRACTITIONER

## 2023-04-10 PROCEDURE — 1159F PR MEDICATION LIST DOCUMENTED IN MEDICAL RECORD: ICD-10-PCS | Mod: HCNC,CPTII,S$GLB, | Performed by: NURSE PRACTITIONER

## 2023-04-10 PROCEDURE — 80053 COMPREHEN METABOLIC PANEL: CPT | Mod: HCNC | Performed by: PHYSICIAN ASSISTANT

## 2023-04-10 PROCEDURE — 99214 PR OFFICE/OUTPT VISIT, EST, LEVL IV, 30-39 MIN: ICD-10-PCS | Mod: HCNC,S$GLB,, | Performed by: NURSE PRACTITIONER

## 2023-04-10 PROCEDURE — 1101F PT FALLS ASSESS-DOCD LE1/YR: CPT | Mod: HCNC,CPTII,S$GLB, | Performed by: NURSE PRACTITIONER

## 2023-04-10 PROCEDURE — 99214 OFFICE O/P EST MOD 30 MIN: CPT | Mod: HCNC,S$GLB,, | Performed by: NURSE PRACTITIONER

## 2023-04-10 PROCEDURE — 82306 VITAMIN D 25 HYDROXY: CPT | Mod: HCNC | Performed by: PHYSICIAN ASSISTANT

## 2023-04-10 PROCEDURE — 36415 COLL VENOUS BLD VENIPUNCTURE: CPT | Mod: HCNC,PO | Performed by: PHYSICIAN ASSISTANT

## 2023-04-10 PROCEDURE — 3288F PR FALLS RISK ASSESSMENT DOCUMENTED: ICD-10-PCS | Mod: HCNC,CPTII,S$GLB, | Performed by: NURSE PRACTITIONER

## 2023-04-10 PROCEDURE — 3288F FALL RISK ASSESSMENT DOCD: CPT | Mod: HCNC,CPTII,S$GLB, | Performed by: NURSE PRACTITIONER

## 2023-04-10 PROCEDURE — 1159F MED LIST DOCD IN RCRD: CPT | Mod: HCNC,CPTII,S$GLB, | Performed by: NURSE PRACTITIONER

## 2023-04-10 PROCEDURE — 99999 PR PBB SHADOW E&M-EST. PATIENT-LVL IV: CPT | Mod: PBBFAC,HCNC,, | Performed by: NURSE PRACTITIONER

## 2023-04-10 PROCEDURE — 3078F DIAST BP <80 MM HG: CPT | Mod: HCNC,CPTII,S$GLB, | Performed by: NURSE PRACTITIONER

## 2023-04-10 PROCEDURE — 3077F SYST BP >= 140 MM HG: CPT | Mod: HCNC,CPTII,S$GLB, | Performed by: NURSE PRACTITIONER

## 2023-04-10 PROCEDURE — 1160F RVW MEDS BY RX/DR IN RCRD: CPT | Mod: HCNC,CPTII,S$GLB, | Performed by: NURSE PRACTITIONER

## 2023-04-10 PROCEDURE — 1160F PR REVIEW ALL MEDS BY PRESCRIBER/CLIN PHARMACIST DOCUMENTED: ICD-10-PCS | Mod: HCNC,CPTII,S$GLB, | Performed by: NURSE PRACTITIONER

## 2023-04-10 RX ORDER — AMOXICILLIN 500 MG
2 CAPSULE ORAL 2 TIMES DAILY
Qty: 360 CAPSULE | Refills: 1 | Status: SHIPPED | OUTPATIENT
Start: 2023-04-10

## 2023-04-10 RX ORDER — LEVOCETIRIZINE DIHYDROCHLORIDE 5 MG/1
5 TABLET, FILM COATED ORAL NIGHTLY
Qty: 90 TABLET | Refills: 1 | Status: SHIPPED | OUTPATIENT
Start: 2023-04-10 | End: 2024-04-09

## 2023-04-10 NOTE — PROGRESS NOTES
"Subjective:       Patient ID: Brianne Martin is a 78 y.o. female.    Chief Complaint: Breast Mass    Pt presents to clinic today for possible breast mass  Started with breast pain about 2 weeks ago  She can feel a small nodule to the left breast  Last mammogram neg in Nov 2022  Requesting imaging today    Pt is diabetic  Due for updated A1C  She had some issues with hypoglycemia so we have stopped her insulin  She is concerned due to seeing some increased glucoses  Seeing 140-180s fasting with occasionally seeing 200s in the afternoon  Overall feeling well  Taking trulicity and metformin    She is also complaining of having frequent UTIs  We have 1 documented in June 2022  But pt reports she has been seen urgent care multiple times since then for UTIs  Daughter request to see urology    Attempted to do med rec but pt is not sure of what meds she is or isn't taking  We clarified what we could     BP (!) 158/76   Pulse 96   Temp 97.4 °F (36.3 °C)   Ht 5' 2" (1.575 m)   Wt 63.9 kg (140 lb 14 oz)   SpO2 96%   BMI 25.77 kg/m²     Review of Systems   Constitutional:  Negative for activity change, appetite change, chills, diaphoresis, fatigue, fever and unexpected weight change.   HENT: Negative.     Respiratory:  Negative for cough and shortness of breath.    Cardiovascular:  Negative for chest pain, palpitations and leg swelling.   Gastrointestinal: Negative.    Genitourinary: Negative.    Musculoskeletal:  Positive for arthralgias and myalgias.   Skin:  Negative for color change, pallor, rash and wound.        Breast pain/mass   Allergic/Immunologic: Negative for immunocompromised state.   Neurological: Negative.  Negative for dizziness and facial asymmetry.   Hematological:  Negative for adenopathy. Does not bruise/bleed easily.   Psychiatric/Behavioral:  Negative for agitation, behavioral problems and confusion.      Objective:      Physical Exam  Vitals and nursing note reviewed.   Constitutional:       " General: She is not in acute distress.     Appearance: Normal appearance. She is well-developed. She is not diaphoretic.   HENT:      Head: Normocephalic and atraumatic.   Cardiovascular:      Rate and Rhythm: Normal rate and regular rhythm.      Heart sounds: Normal heart sounds. No murmur heard.  Pulmonary:      Effort: Pulmonary effort is normal. No respiratory distress.      Breath sounds: Normal breath sounds.   Chest:   Breasts:     Right: Normal.      Left: Mass and tenderness present.      Comments: Small pea sized mass noted to left upper, inner quad of breast- tender to touch  Musculoskeletal:         General: Normal range of motion.   Skin:     General: Skin is warm and dry.      Findings: No rash.   Neurological:      Mental Status: She is alert.   Psychiatric:         Mood and Affect: Mood normal.         Behavior: Behavior normal. Behavior is cooperative.         Thought Content: Thought content normal.         Judgment: Judgment normal.       Assessment:       1. Breast pain, left    2. Lump in upper inner quadrant of left breast    3. Hypertension associated with diabetes    4. Type 2 diabetes mellitus without complication, without long-term current use of insulin    5. Hyperlipidemia associated with type 2 diabetes mellitus    6. Frequent UTI    7. BMI 25.0-25.9,adult        Plan:       Brianne was seen today for breast mass.    Diagnoses and all orders for this visit:    Breast pain, left  -     Mammo Digital Diagnostic Left; Future  -     US Breast Left Limited; Future    Lump in upper inner quadrant of left breast  -     Mammo Digital Diagnostic Left; Future  -     US Breast Left Limited; Future    Hypertension associated with diabetes       - Chronic, elevated today. Reports she is taking meds as prescribed.  Has appt in 2 weeks for bp check    Type 2 diabetes mellitus without complication, without long-term current use of insulin       - Chronic, stable. A1C due. Continue meds as  prescribed    Hyperlipidemia associated with type 2 diabetes mellitus  -     omega-3 fatty acids/fish oil (FISH OIL-OMEGA-3 FATTY ACIDS) 300-1,000 mg capsule; Take 2 capsules by mouth 2 (two) times a day.  Frequent UTI  -     Ambulatory referral/consult to Urology; Future  - Discussed frequent urination, proper hygiene. Will refer to urology     BMI 25.0-25.9,adult    Other orders  -     levocetirizine (XYZAL) 5 MG tablet; Take 1 tablet (5 mg total) by mouth every evening.    Will ultrasound/mammogram breast and follow up with results  Take meds as prescribed  Urology referral for frequent UTIs  A1C as scheduled  Follow up for worsening or no improvement in symptoms and PRN.

## 2023-04-11 LAB — 25(OH)D3+25(OH)D2 SERPL-MCNC: 52 NG/ML (ref 30–96)

## 2023-04-18 ENCOUNTER — HOSPITAL ENCOUNTER (OUTPATIENT)
Dept: RADIOLOGY | Facility: HOSPITAL | Age: 78
Discharge: HOME | End: 2023-04-18
Attending: NURSE PRACTITIONER
Payer: MEDICARE

## 2023-04-18 VITALS — WEIGHT: 140.88 LBS | BODY MASS INDEX: 25.92 KG/M2 | HEIGHT: 62 IN

## 2023-04-18 DIAGNOSIS — N64.4 BREAST PAIN, LEFT: ICD-10-CM

## 2023-04-18 DIAGNOSIS — N63.22 LUMP IN UPPER INNER QUADRANT OF LEFT BREAST: ICD-10-CM

## 2023-04-18 PROCEDURE — 77061 BREAST TOMOSYNTHESIS UNI: CPT | Mod: 26,HCNC,LT, | Performed by: STUDENT IN AN ORGANIZED HEALTH CARE EDUCATION/TRAINING PROGRAM

## 2023-04-18 PROCEDURE — 76642 ULTRASOUND BREAST LIMITED: CPT | Mod: 26,HCNC,LT, | Performed by: STUDENT IN AN ORGANIZED HEALTH CARE EDUCATION/TRAINING PROGRAM

## 2023-04-18 PROCEDURE — 77065 DX MAMMO INCL CAD UNI: CPT | Mod: 26,HCNC,LT, | Performed by: STUDENT IN AN ORGANIZED HEALTH CARE EDUCATION/TRAINING PROGRAM

## 2023-04-18 PROCEDURE — 77065 MAMMO DIGITAL DIAGNOSTIC LEFT WITH TOMO: ICD-10-PCS | Mod: 26,HCNC,LT, | Performed by: STUDENT IN AN ORGANIZED HEALTH CARE EDUCATION/TRAINING PROGRAM

## 2023-04-18 PROCEDURE — 77061 BREAST TOMOSYNTHESIS UNI: CPT | Mod: TC,HCNC,LT

## 2023-04-18 PROCEDURE — 76642 US BREAST LEFT LIMITED: ICD-10-PCS | Mod: 26,HCNC,LT, | Performed by: STUDENT IN AN ORGANIZED HEALTH CARE EDUCATION/TRAINING PROGRAM

## 2023-04-18 PROCEDURE — 77061 MAMMO DIGITAL DIAGNOSTIC LEFT WITH TOMO: ICD-10-PCS | Mod: 26,HCNC,LT, | Performed by: STUDENT IN AN ORGANIZED HEALTH CARE EDUCATION/TRAINING PROGRAM

## 2023-04-18 PROCEDURE — 76642 ULTRASOUND BREAST LIMITED: CPT | Mod: TC,HCNC,LT

## 2023-04-26 ENCOUNTER — PATIENT MESSAGE (OUTPATIENT)
Dept: PRIMARY CARE CLINIC | Facility: CLINIC | Age: 78
End: 2023-04-26
Payer: MEDICARE

## 2023-04-28 ENCOUNTER — OFFICE VISIT (OUTPATIENT)
Dept: UROLOGY | Facility: CLINIC | Age: 78
End: 2023-04-28
Payer: MEDICARE

## 2023-04-28 VITALS
HEART RATE: 84 BPM | WEIGHT: 140.88 LBS | BODY MASS INDEX: 25.92 KG/M2 | SYSTOLIC BLOOD PRESSURE: 158 MMHG | DIASTOLIC BLOOD PRESSURE: 80 MMHG | HEIGHT: 62 IN

## 2023-04-28 DIAGNOSIS — N95.8 GENITOURINARY SYNDROME OF MENOPAUSE: Primary | ICD-10-CM

## 2023-04-28 DIAGNOSIS — N39.0 FREQUENT UTI: ICD-10-CM

## 2023-04-28 LAB
BACTERIA #/AREA URNS AUTO: ABNORMAL /HPF
HYALINE CASTS UR QL AUTO: 11 /LPF
MICROSCOPIC COMMENT: ABNORMAL
RBC #/AREA URNS AUTO: 4 /HPF (ref 0–4)
SQUAMOUS #/AREA URNS AUTO: 1 /HPF
WBC #/AREA URNS AUTO: 12 /HPF (ref 0–5)

## 2023-04-28 PROCEDURE — 1101F PR PT FALLS ASSESS DOC 0-1 FALLS W/OUT INJ PAST YR: ICD-10-PCS | Mod: HCNC,CPTII,S$GLB, | Performed by: UROLOGY

## 2023-04-28 PROCEDURE — 1159F MED LIST DOCD IN RCRD: CPT | Mod: HCNC,CPTII,S$GLB, | Performed by: UROLOGY

## 2023-04-28 PROCEDURE — 3077F SYST BP >= 140 MM HG: CPT | Mod: HCNC,CPTII,S$GLB, | Performed by: UROLOGY

## 2023-04-28 PROCEDURE — 51798 US URINE CAPACITY MEASURE: CPT | Mod: HCNC,S$GLB,, | Performed by: UROLOGY

## 2023-04-28 PROCEDURE — 3079F DIAST BP 80-89 MM HG: CPT | Mod: HCNC,CPTII,S$GLB, | Performed by: UROLOGY

## 2023-04-28 PROCEDURE — 1101F PT FALLS ASSESS-DOCD LE1/YR: CPT | Mod: HCNC,CPTII,S$GLB, | Performed by: UROLOGY

## 2023-04-28 PROCEDURE — 81001 URINALYSIS AUTO W/SCOPE: CPT | Mod: HCNC | Performed by: UROLOGY

## 2023-04-28 PROCEDURE — 87086 URINE CULTURE/COLONY COUNT: CPT | Mod: HCNC | Performed by: UROLOGY

## 2023-04-28 PROCEDURE — 51798 PR MEAS,POST-VOID RES,US,NON-IMAGING: ICD-10-PCS | Mod: HCNC,S$GLB,, | Performed by: UROLOGY

## 2023-04-28 PROCEDURE — 3288F FALL RISK ASSESSMENT DOCD: CPT | Mod: HCNC,CPTII,S$GLB, | Performed by: UROLOGY

## 2023-04-28 PROCEDURE — 3079F PR MOST RECENT DIASTOLIC BLOOD PRESSURE 80-89 MM HG: ICD-10-PCS | Mod: HCNC,CPTII,S$GLB, | Performed by: UROLOGY

## 2023-04-28 PROCEDURE — 99204 OFFICE O/P NEW MOD 45 MIN: CPT | Mod: HCNC,S$GLB,, | Performed by: UROLOGY

## 2023-04-28 PROCEDURE — 3288F PR FALLS RISK ASSESSMENT DOCUMENTED: ICD-10-PCS | Mod: HCNC,CPTII,S$GLB, | Performed by: UROLOGY

## 2023-04-28 PROCEDURE — 99999 PR PBB SHADOW E&M-EST. PATIENT-LVL V: ICD-10-PCS | Mod: PBBFAC,HCNC,, | Performed by: UROLOGY

## 2023-04-28 PROCEDURE — 99204 PR OFFICE/OUTPT VISIT, NEW, LEVL IV, 45-59 MIN: ICD-10-PCS | Mod: HCNC,S$GLB,, | Performed by: UROLOGY

## 2023-04-28 PROCEDURE — 1159F PR MEDICATION LIST DOCUMENTED IN MEDICAL RECORD: ICD-10-PCS | Mod: HCNC,CPTII,S$GLB, | Performed by: UROLOGY

## 2023-04-28 PROCEDURE — 99999 PR PBB SHADOW E&M-EST. PATIENT-LVL V: CPT | Mod: PBBFAC,HCNC,, | Performed by: UROLOGY

## 2023-04-28 PROCEDURE — 3077F PR MOST RECENT SYSTOLIC BLOOD PRESSURE >= 140 MM HG: ICD-10-PCS | Mod: HCNC,CPTII,S$GLB, | Performed by: UROLOGY

## 2023-04-28 NOTE — PROGRESS NOTES
Chief Complaint   Patient presents with    Urinary Tract Infection       Referring Provider: Tom Carrasco      History of Present Illness:   Brianne Martin is a 78 y.o. female here for evaluation of Urinary Tract Infection    4/28/23-79yo female, here for evaluation of frequent UTI.  There is 1 culture from 6/2022 on file, which grew E. Coli, resistant to ampicillin and fluoroquinolones.  She had a negative culture at [facility] 10/2022. She was seen at [facility] 1 month ago and prescribed Keflex bid x 7 days.   When she has a UTI, she experiences dysuria. She denies gross hematuria or frequency. Urine also smells foul. No associated fever, but she does have bilateral flank pain. Seems to happen 2-3 times a year. Symptoms generally improve with antibiotics. Ongoing about 10 years. Pt had 7 vaginal deliveries.         Review of Systems   Constitutional:  Negative for chills and fever.   Gastrointestinal:  Positive for constipation.   Genitourinary:  Negative for frequency and hematuria.   All other systems reviewed and are negative.      Past Medical History:   Diagnosis Date    Arthritis 12/10/2014    Bell's palsy 02/03/2022    Elevated LFTs 2012    negative workup    Hyperlipidemia LDL goal <100     Hypertension     Mixed anxiety and depressive disorder     Osteoporosis, postmenopausal     Overweight(278.02)     Reflux     Traumatic closed displaced bimalleolar fracture of right ankle 12/25/2013    Type II or unspecified type diabetes mellitus without mention of complication, uncontrolled     Vitamin D deficiency disease        Past Surgical History:   Procedure Laterality Date    APPENDECTOMY      CATARACT EXTRACTION      ou    COLONOSCOPY N/A 04/21/2021    Procedure: COLONOSCOPY;  Surgeon: Shanice Garcia MD;  Location: [facility];  Service: Endoscopy;  Laterality: N/A;    ORIF right ankle  12/01/2013    TUBAL LIGATION         Family History   Problem Relation Age of Onset    Hypertension Other     Diabetes  Other     Cataracts Father     No Known Problems Mother     No Known Problems Sister     No Known Problems Brother     No Known Problems Maternal Aunt     No Known Problems Maternal Uncle     No Known Problems Paternal Aunt     No Known Problems Paternal Uncle     No Known Problems Maternal Grandmother     No Known Problems Maternal Grandfather     No Known Problems Paternal Grandmother     No Known Problems Paternal Grandfather     Amblyopia Neg Hx     Blindness Neg Hx     Cancer Neg Hx     Glaucoma Neg Hx     Macular degeneration Neg Hx     Retinal detachment Neg Hx     Strabismus Neg Hx     Stroke Neg Hx     Thyroid disease Neg Hx        Social History     Tobacco Use    Smoking status: Never    Smokeless tobacco: Never   Substance Use Topics    Alcohol use: Yes     Comment: socially    Drug use: Never       Current Outpatient Medications   Medication Sig Dispense Refill    alendronate (FOSAMAX) 70 MG tablet TAKE 1 CAPSULE EVERY WEEK (MEDICINE AS VITAMIN      alirocumab (PRALUENT PEN) 150 mg/mL PnIj Inject 1 mL (150 mg total) into the skin every 14 (fourteen) days. 6 mL 3    amLODIPine (NORVASC) 10 MG tablet Take 1 tablet (10 mg total) by mouth every morning. 90 tablet 2    aspirin (ECOTRIN) 81 MG EC tablet       blood sugar diagnostic Strp To check BG 3 times daily, to use with insurance preferred meter. Patient is on insulin 100 strip 3    blood-glucose meter kit To check BG 3 times daily, to use with insurance preferred meter 1 each 0    cholecalciferol, vitamin D3, 125 mcg (5,000 unit) capsule Take 1 capsule (5,000 Units total) by mouth once daily. 90 capsule 3    clopidogreL (PLAVIX) 75 mg tablet Take 1 tablet (75 mg total) by mouth once daily. 90 tablet 2    cycloSPORINE (RESTASIS) 0.05 % ophthalmic emulsion Place 1 drop into both eyes 2 (two) times daily. 180 vial 2    dulaglutide (TRULICITY) 3 mg/0.5 mL pen injector Inject 3 mg into the skin every 7 days. 12 pen 3    fluticasone propionate (FLONASE) 50  "mcg/actuation nasal spray 1 spray (50 mcg total) by Each Nostril route once daily. 16 g 11    gabapentin (NEURONTIN) 300 MG capsule Take 1 capsule (300 mg total) by mouth 2 (two) times daily. 180 capsule 3    glucagon (GLUCAGON, HUMAN RECOMBINANT,) 1 mg Kit Inject 1 mg into the muscle as needed.      insulin needles, disposable, (BD INSULIN PEN NEEDLE UF MINI) 31 x 3/16 " Ndle Inject 1 Units into the skin every evening. 100 each 3    lancets Misc To check BG 3 times daily, to use with insurance preferred meter. Patient is on insulin 100 each 3    latanoprost (XALATAN) 0.005 % ophthalmic solution Place 1 drop into both eyes every evening. 2.5 mL 3    levocetirizine (XYZAL) 5 MG tablet Take 1 tablet (5 mg total) by mouth every evening. 90 tablet 1    meloxicam (MOBIC) 7.5 MG tablet TAKE ONE TABLET EVERY DAY (MEDICINE PAIN)      metFORMIN (GLUCOPHAGE) 1000 MG tablet Take 1 tablet (1,000 mg total) by mouth 2 (two) times daily with meals. 180 tablet 3    multivitamin Liqd Take by mouth.      NOVOLOG FLEXPEN U-100 INSULIN 100 unit/mL (3 mL) InPn pen INJECT 20 UNITS INTO THE SKIN 3 TIMES DAILY WITH MEALS 60 mL 0    omega-3 acid ethyl esters (LOVAZA) 1 gram capsule Take 2 capsules (2 g total) by mouth 2 (two) times daily. 360 capsule 1    omega-3 fatty acids/fish oil (FISH OIL-OMEGA-3 FATTY ACIDS) 300-1,000 mg capsule Take 2 capsules by mouth 2 (two) times a day. 360 capsule 1    valACYclovir (VALTREX) 1000 MG tablet Take 1,000 mg by mouth 3 (three) times daily.      venlafaxine (EFFEXOR) 75 MG tablet TAKE 1 TABLET TWICE DAILY 180 tablet 2    VITAMIN D2 1,250 mcg (50,000 unit) capsule TAKE 1 CAPSULE EVERY WEEK (MEDICINE AS VITAMIN      white petrolatum-mineral oil 57.3-42.5% (REFRESH P.M.) 57.3-42.5 % Oint Place into the left eye every evening. 3.5 g 2    conjugated estrogens (PREMARIN) vaginal cream Apply a pea sized amount PV QHS x 2 weeks. Then, apply 3 times a week. 30 g 3    losartan (COZAAR) 100 MG tablet TAKE ONE " TABLET EVERY DAY (FOR BLOOD PRESSURE)  Strength: 100 mg 90 tablet 2    olopatadine (PAZEO) 0.7 % Drop Place 1 drop into both eyes every morning. 5 mL 3    omeprazole (PRILOSEC) 40 MG capsule Take 1 capsule (40 mg total) by mouth every morning. 30 capsule 2     No current facility-administered medications for this visit.       Review of patient's allergies indicates:   Allergen Reactions    Ace inhibitors      Other reaction(s): cough       Physical Exam  Vitals:    04/28/23 0949   BP: (!) 158/80   Pulse: 84     General: Well-developed, well-nourished, in no acute distress  HEENT: Normocephalic, atraumatic, extraocular movements intact  Neck: Supple, no supraclavicular or cervical lymphadenopathy, trachea midline  Respirations: even and unlabored  Back: midline spine, No CVA tenderness  Abdomen: soft, Non-tender, non-distended, no palpable masses, no rebound or guarding  : Normal external female genitalia without lesions. Orthotopic urethral meatus. atrophic vaginal mucosa. Grade 1 Cysotcele, Grade 1 Rectocele, negative cough stress test  Extremities: moves all equally, no clubbing, cyanosis or edema  Skin: Warm and dry. No lesions  Psych: normal affect  Neuro: Alert and oriented x 3. Cranial nerves II-XII intact    Bladder scan (not PVR): 51cc    Urinalysis  pH, UA   Date Value Ref Range Status   06/02/2022 5  Final   12/26/2013 7.0 5.0 - 8.0 Final     Protein, UA   Date Value Ref Range Status   12/26/2013 Negative Negative Final     Comment:     Recommend a 24 hour urine protein or a urine   protein/creatinine ratio if globulin induced proteinuria is  clinically suspected.     Glucose, UA   Date Value Ref Range Status   12/26/2013 3+ (A) Negative Final     Occult Blood UA   Date Value Ref Range Status   12/26/2013 1+ (A) Negative Final     Nitrite, UA   Date Value Ref Range Status   06/02/2022 POSITIVE  Final   12/26/2013 Negative Negative Final     Leukocytes, UA   Date Value Ref Range Status   12/26/2013  Negative Negative Final       Assessment:  1. Genitourinary syndrome of menopause        2. Frequent UTI  Ambulatory referral/consult to Urology    Urinalysis Microscopic    Urine culture            Plan:   Genitourinary syndrome of menopause    Frequent UTI  -     Ambulatory referral/consult to Urology  -     Urinalysis Microscopic; Future; Expected date: 04/28/2023  -     Urine culture; Future; Expected date: 04/28/2023    Other orders  -     conjugated estrogens (PREMARIN) vaginal cream; Apply a pea sized amount PV QHS x 2 weeks. Then, apply 3 times a week.  Dispense: 30 g; Refill: 3    Recommended cranberry supplement instead of juice  Fluid augmentation  Constipation management.       Follow up in about 6 months (around 10/28/2023).

## 2023-04-30 LAB — BACTERIA UR CULT: NO GROWTH

## 2023-05-01 ENCOUNTER — TELEPHONE (OUTPATIENT)
Dept: UROLOGY | Facility: CLINIC | Age: 78
End: 2023-05-01
Payer: MEDICARE

## 2023-05-01 DIAGNOSIS — R31.29 MICROHEMATURIA: Primary | ICD-10-CM

## 2023-05-01 NOTE — TELEPHONE ENCOUNTER
Called pt concerning labs. No answer. [facility] 3:44    ----- Message from Lisa Álvarez MD sent at 5/1/2023  8:02 AM CDT -----  Notify patient she does have more blood in her urine than what is considered normal. I have ordered a CT scan and would like her to then follow up for a cysto. Please schedule both.

## 2023-05-11 ENCOUNTER — PATIENT MESSAGE (OUTPATIENT)
Dept: PRIMARY CARE CLINIC | Facility: CLINIC | Age: 78
End: 2023-05-11
Payer: MEDICARE

## 2023-05-15 DIAGNOSIS — H40.1134 PRIMARY OPEN ANGLE GLAUCOMA (POAG) OF BOTH EYES, INDETERMINATE STAGE: ICD-10-CM

## 2023-05-15 RX ORDER — LATANOPROST 50 UG/ML
SOLUTION/ DROPS OPHTHALMIC
Qty: 5 ML | Refills: 0 | Status: SHIPPED | OUTPATIENT
Start: 2023-05-15 | End: 2024-04-03 | Stop reason: SDUPTHER

## 2023-06-03 DIAGNOSIS — E11.9 TYPE 2 DIABETES MELLITUS WITHOUT COMPLICATION, WITH LONG-TERM CURRENT USE OF INSULIN: ICD-10-CM

## 2023-06-03 DIAGNOSIS — Z79.4 TYPE 2 DIABETES MELLITUS WITHOUT COMPLICATION, WITH LONG-TERM CURRENT USE OF INSULIN: ICD-10-CM

## 2023-06-03 DIAGNOSIS — K21.9 GASTROESOPHAGEAL REFLUX DISEASE, UNSPECIFIED WHETHER ESOPHAGITIS PRESENT: ICD-10-CM

## 2023-06-03 RX ORDER — OMEPRAZOLE 40 MG/1
CAPSULE, DELAYED RELEASE ORAL
Qty: 90 CAPSULE | Refills: 2 | Status: SHIPPED | OUTPATIENT
Start: 2023-06-03 | End: 2023-10-07 | Stop reason: SDUPTHER

## 2023-06-03 RX ORDER — METFORMIN HYDROCHLORIDE 1000 MG/1
TABLET ORAL
Qty: 180 TABLET | Refills: 0 | Status: SHIPPED | OUTPATIENT
Start: 2023-06-03 | End: 2023-10-30

## 2023-06-03 NOTE — TELEPHONE ENCOUNTER
Refill Decision Note   Brianne Veronica  is requesting a refill authorization.  Brief Assessment and Rationale for Refill:  Approve     Medication Therapy Plan:         Comments:     No Care Gaps recommended.     Note composed:5:46 PM 06/03/2023

## 2023-06-03 NOTE — TELEPHONE ENCOUNTER
No care due was identified.  [facility] Embedded Care Due Messages. Reference number: [id number].   6/03/2023 3:08:26 AM CDT

## 2023-08-17 ENCOUNTER — LAB VISIT (OUTPATIENT)
Dept: LAB | Facility: HOSPITAL | Age: 78
End: 2023-08-17
Attending: INTERNAL MEDICINE
Payer: MEDICARE

## 2023-08-17 DIAGNOSIS — M15.9 PRIMARY OSTEOARTHRITIS INVOLVING MULTIPLE JOINTS: ICD-10-CM

## 2023-08-17 DIAGNOSIS — M81.0 OSTEOPOROSIS, POSTMENOPAUSAL: ICD-10-CM

## 2023-08-17 LAB
25(OH)D3+25(OH)D2 SERPL-MCNC: 49 NG/ML (ref 30–96)
ALBUMIN SERPL BCP-MCNC: 3.9 G/DL (ref 3.5–5.2)
ALP SERPL-CCNC: 64 U/L (ref 55–135)
ALT SERPL W/O P-5'-P-CCNC: 22 U/L (ref 10–44)
ANION GAP SERPL CALC-SCNC: 9 MMOL/L (ref 8–16)
AST SERPL-CCNC: 28 U/L (ref 10–40)
BILIRUB SERPL-MCNC: 0.3 MG/DL (ref 0.1–1)
BUN SERPL-MCNC: 11 MG/DL (ref 8–23)
CALCIUM SERPL-MCNC: 9.3 MG/DL (ref 8.7–10.5)
CHLORIDE SERPL-SCNC: 108 MMOL/L (ref 95–110)
CO2 SERPL-SCNC: 26 MMOL/L (ref 23–29)
CREAT SERPL-MCNC: 0.7 MG/DL (ref 0.5–1.4)
EST. GFR  (NO RACE VARIABLE): >60 ML/MIN/1.73 M^2
GLUCOSE SERPL-MCNC: 183 MG/DL (ref 70–110)
POTASSIUM SERPL-SCNC: 3.9 MMOL/L (ref 3.5–5.1)
PROT SERPL-MCNC: 7.2 G/DL (ref 6–8.4)
PTH-INTACT SERPL-MCNC: 69.5 PG/ML (ref 9–77)
SODIUM SERPL-SCNC: 143 MMOL/L (ref 136–145)

## 2023-08-17 PROCEDURE — 80053 COMPREHEN METABOLIC PANEL: CPT | Mod: HCNC | Performed by: INTERNAL MEDICINE

## 2023-08-17 PROCEDURE — 83970 ASSAY OF PARATHORMONE: CPT | Mod: HCNC | Performed by: INTERNAL MEDICINE

## 2023-08-17 PROCEDURE — 82306 VITAMIN D 25 HYDROXY: CPT | Mod: HCNC | Performed by: INTERNAL MEDICINE

## 2023-08-22 ENCOUNTER — OFFICE VISIT (OUTPATIENT)
Dept: RHEUMATOLOGY | Facility: CLINIC | Age: 78
End: 2023-08-22
Payer: MEDICARE

## 2023-08-22 ENCOUNTER — HOSPITAL ENCOUNTER (OUTPATIENT)
Dept: RADIOLOGY | Facility: HOSPITAL | Age: 78
Discharge: HOME | End: 2023-08-22
Attending: PHYSICIAN ASSISTANT
Payer: MEDICARE

## 2023-08-22 ENCOUNTER — INFUSION (OUTPATIENT)
Dept: INFUSION THERAPY | Facility: HOSPITAL | Age: 78
End: 2023-08-22
Attending: INTERNAL MEDICINE
Payer: MEDICARE

## 2023-08-22 VITALS
HEART RATE: 87 BPM | WEIGHT: 134.06 LBS | HEIGHT: 62 IN | DIASTOLIC BLOOD PRESSURE: 61 MMHG | BODY MASS INDEX: 24.67 KG/M2 | SYSTOLIC BLOOD PRESSURE: 129 MMHG

## 2023-08-22 VITALS
TEMPERATURE: 100 F | BODY MASS INDEX: 24.67 KG/M2 | WEIGHT: 134.06 LBS | OXYGEN SATURATION: 98 % | RESPIRATION RATE: 20 BRPM | SYSTOLIC BLOOD PRESSURE: 140 MMHG | HEART RATE: 90 BPM | HEIGHT: 62 IN | DIASTOLIC BLOOD PRESSURE: 63 MMHG

## 2023-08-22 DIAGNOSIS — E11.65 TYPE 2 DIABETES MELLITUS WITH HYPERGLYCEMIA, WITH LONG-TERM CURRENT USE OF INSULIN: ICD-10-CM

## 2023-08-22 DIAGNOSIS — M25.562 CHRONIC PAIN OF BOTH KNEES: ICD-10-CM

## 2023-08-22 DIAGNOSIS — Z79.4 TYPE 2 DIABETES MELLITUS WITH HYPERGLYCEMIA, WITH LONG-TERM CURRENT USE OF INSULIN: ICD-10-CM

## 2023-08-22 DIAGNOSIS — M25.561 CHRONIC PAIN OF BOTH KNEES: ICD-10-CM

## 2023-08-22 DIAGNOSIS — G89.29 CHRONIC PAIN OF BOTH KNEES: ICD-10-CM

## 2023-08-22 DIAGNOSIS — M81.0 OSTEOPOROSIS, POSTMENOPAUSAL: Primary | ICD-10-CM

## 2023-08-22 DIAGNOSIS — Z51.81 MEDICATION MONITORING ENCOUNTER: ICD-10-CM

## 2023-08-22 PROCEDURE — 3078F DIAST BP <80 MM HG: CPT | Mod: HCNC,CPTII,S$GLB, | Performed by: PHYSICIAN ASSISTANT

## 2023-08-22 PROCEDURE — 3074F PR MOST RECENT SYSTOLIC BLOOD PRESSURE < 130 MM HG: ICD-10-PCS | Mod: HCNC,CPTII,S$GLB, | Performed by: PHYSICIAN ASSISTANT

## 2023-08-22 PROCEDURE — 3288F FALL RISK ASSESSMENT DOCD: CPT | Mod: HCNC,CPTII,S$GLB, | Performed by: PHYSICIAN ASSISTANT

## 2023-08-22 PROCEDURE — 99999 PR PBB SHADOW E&M-EST. PATIENT-LVL V: ICD-10-PCS | Mod: PBBFAC,HCNC,, | Performed by: PHYSICIAN ASSISTANT

## 2023-08-22 PROCEDURE — 1125F PR PAIN SEVERITY QUANTIFIED, PAIN PRESENT: ICD-10-PCS | Mod: HCNC,CPTII,S$GLB, | Performed by: PHYSICIAN ASSISTANT

## 2023-08-22 PROCEDURE — 99214 PR OFFICE/OUTPT VISIT, EST, LEVL IV, 30-39 MIN: ICD-10-PCS | Mod: 25,HCNC,S$GLB, | Performed by: PHYSICIAN ASSISTANT

## 2023-08-22 PROCEDURE — 1100F PTFALLS ASSESS-DOCD GE2>/YR: CPT | Mod: HCNC,CPTII,S$GLB, | Performed by: PHYSICIAN ASSISTANT

## 2023-08-22 PROCEDURE — 3288F PR FALLS RISK ASSESSMENT DOCUMENTED: ICD-10-PCS | Mod: HCNC,CPTII,S$GLB, | Performed by: PHYSICIAN ASSISTANT

## 2023-08-22 PROCEDURE — 3078F PR MOST RECENT DIASTOLIC BLOOD PRESSURE < 80 MM HG: ICD-10-PCS | Mod: HCNC,CPTII,S$GLB, | Performed by: PHYSICIAN ASSISTANT

## 2023-08-22 PROCEDURE — 20610 LARGE JOINT ASPIRATION/INJECTION: BILATERAL KNEE: ICD-10-PCS | Mod: 50,HCNC,S$GLB, | Performed by: PHYSICIAN ASSISTANT

## 2023-08-22 PROCEDURE — 3074F SYST BP LT 130 MM HG: CPT | Mod: HCNC,CPTII,S$GLB, | Performed by: PHYSICIAN ASSISTANT

## 2023-08-22 PROCEDURE — 99999 PR PBB SHADOW E&M-EST. PATIENT-LVL V: CPT | Mod: PBBFAC,HCNC,, | Performed by: PHYSICIAN ASSISTANT

## 2023-08-22 PROCEDURE — 1100F PR PT FALLS ASSESS DOC 2+ FALLS/FALL W/INJURY/YR: ICD-10-PCS | Mod: HCNC,CPTII,S$GLB, | Performed by: PHYSICIAN ASSISTANT

## 2023-08-22 PROCEDURE — 73564 X-RAY EXAM KNEE 4 OR MORE: CPT | Mod: TC,50,HCNC

## 2023-08-22 PROCEDURE — 73564 X-RAY EXAM KNEE 4 OR MORE: CPT | Mod: 26,50,HCNC, | Performed by: RADIOLOGY

## 2023-08-22 PROCEDURE — 20610 DRAIN/INJ JOINT/BURSA W/O US: CPT | Mod: 50,HCNC,S$GLB, | Performed by: PHYSICIAN ASSISTANT

## 2023-08-22 PROCEDURE — 96372 THER/PROPH/DIAG INJ SC/IM: CPT | Mod: HCNC

## 2023-08-22 PROCEDURE — 63600175 PHARM REV CODE 636 W HCPCS: Mod: JZ,JG,HCNC | Performed by: PHYSICIAN ASSISTANT

## 2023-08-22 PROCEDURE — 73564 XR KNEE ORTHO BILAT WITH FLEXION: ICD-10-PCS | Mod: 26,50,HCNC, | Performed by: RADIOLOGY

## 2023-08-22 PROCEDURE — 1125F AMNT PAIN NOTED PAIN PRSNT: CPT | Mod: HCNC,CPTII,S$GLB, | Performed by: PHYSICIAN ASSISTANT

## 2023-08-22 PROCEDURE — 99214 OFFICE O/P EST MOD 30 MIN: CPT | Mod: 25,HCNC,S$GLB, | Performed by: PHYSICIAN ASSISTANT

## 2023-08-22 RX ORDER — METHYLPREDNISOLONE ACETATE 80 MG/ML
80 INJECTION, SUSPENSION INTRA-ARTICULAR; INTRALESIONAL; INTRAMUSCULAR; SOFT TISSUE
Status: DISCONTINUED | OUTPATIENT
Start: 2023-08-22 | End: 2023-08-22 | Stop reason: HOSPADM

## 2023-08-22 RX ORDER — TRIAMCINOLONE ACETONIDE 1 MG/G
CREAM TOPICAL 2 TIMES DAILY
COMMUNITY
Start: 2023-08-19

## 2023-08-22 RX ORDER — SIMVASTATIN 40 MG/1
40 TABLET, FILM COATED ORAL
COMMUNITY
Start: 2023-08-19

## 2023-08-22 RX ORDER — ABALOPARATIDE 2000 UG/ML
80 INJECTION, SOLUTION SUBCUTANEOUS DAILY
Qty: 30 EACH | Refills: 11
Start: 2023-08-22 | End: 2023-08-22

## 2023-08-22 RX ORDER — TRIAMCINOLONE ACETONIDE 40 MG/ML
40 INJECTION, SUSPENSION INTRA-ARTICULAR; INTRAMUSCULAR
Status: DISCONTINUED | OUTPATIENT
Start: 2023-08-22 | End: 2023-08-22 | Stop reason: HOSPADM

## 2023-08-22 RX ADMIN — METHYLPREDNISOLONE ACETATE 80 MG: 80 INJECTION, SUSPENSION INTRA-ARTICULAR; INTRALESIONAL; INTRAMUSCULAR; SOFT TISSUE at 11:08

## 2023-08-22 RX ADMIN — TRIAMCINOLONE ACETONIDE 40 MG: 40 INJECTION, SUSPENSION INTRA-ARTICULAR; INTRAMUSCULAR at 11:08

## 2023-08-22 RX ADMIN — DENOSUMAB 60 MG: 60 INJECTION SUBCUTANEOUS at 12:08

## 2023-08-22 NOTE — DISCHARGE INSTRUCTIONS
[facility]  [id number] [facility]  [address]  [phone] phone     [phone] fax  Hours of Operation: Monday- Friday 8:00am- 5:00pm  After hours phone  [phone]  Hematology / Oncology Physicians on call    Dr. John Arora      Nurse Practitioners:    Yvonne Cates, ILYA May, ILYA Mtz, ILYA Stover, ILYA Walton, [place]      Please don't hesitate to call if you have any concerns.   FALL PREVENTION   Falls often occur due to slipping, tripping or losing your balance. Here are ways to reduce your risk of falling again.   Was there anything that caused your fall that can be fixed, removed or replaced?   Make your home safe by keeping walkways clear of objects you may trip over.   Use non-slip pads under rugs.   Do not walk in poorly lit areas.   Do not stand on chairs or wobbly ladders.   Use caution when reaching overhead or looking upward. This position can cause a loss of balance.   Be sure your shoes fit properly, have non-slip bottoms and are in good condition.   Be cautious when going up and down stairs, curbs, and when walking on uneven sidewalks.   If your balance is poor, consider using a cane or walker.   If your fall was related to alcohol use, stop or limit alcohol intake.   If your fall was related to use of sleeping medicines, talk to your doctor about this. You may need to reduce your dosage at bedtime if you awaken during the night to go to the bathroom.   To reduce the need for nighttime bathroom trips:   Avoid drinking fluids for several hours before going to bed   Empty your bladder before going to bed   Men can keep a urinal at the bedside   © 1510-6253 [facility], [address] [place] [address], [place] [address]. All rights reserved. This information is not intended as a substitute for professional medical care. Always follow your healthcare professional's instructions.

## 2023-08-22 NOTE — PROCEDURES
Large Joint Aspiration/Injection: bilateral knee    Date/Time: 8/22/2023 11:30 AM    Performed by: Lamar Muller PA-C  Authorized by: Lamar Muller PA-C    Indications:  Pain  Site marked: the procedure site was marked    Timeout: prior to procedure the correct patient, procedure, and site was verified    Prep: patient was prepped and draped in usual sterile fashion      Local anesthesia used?: Yes    Anesthesia:  Local infiltration  Local anesthetic:  Lidocaine 2% without epinephrine  Anesthetic total (ml):  2      Details:  Needle Size:  22 G  Ultrasonic Guidance for needle placement?: No    Approach:  Anterolateral  Location:  Knee  Laterality:  Bilateral  Site:  Bilateral knee  Medications (Right):  80 mg methylPREDNISolone acetate 80 mg/mL; 40 mg triamcinolone acetonide 40 mg/mL  Medications (Left):  80 mg methylPREDNISolone acetate 80 mg/mL; 40 mg triamcinolone acetonide 40 mg/mL  Patient tolerance:  Patient tolerated the procedure well with no immediate complications     Left Knee Injection Report:  After verbal consent was obtained for left knee injection, patient ID, site, and side were verified.  The  left  knee was sterilly prepped in the standard fashion.  A 22-gauge needle was introduced into left knee joint from an demetria-lateral approach without complication. The left knee was then injected with 40 mg lidocaine plain and 40 mg Kenalog.  A sterile bandaid was applied.  The patient was informed to apply an ice pack approximately 10min once arriving home and not to do anything strenuous for 24hours.  She was instructed to call if there were any problems. The patient was discharged in stable condition.    Right Knee Injection Report:  After verbal consent was obtained for right knee injection, patient ID, site, and side were verified.  The  right  knee was sterilly prepped in the standard fashion.  A 22-gauge needle was introduced into right knee joint from an demetria-lateral approach  without complication. The right knee was then injected with 40 mg lidocaine plain and 40 mg Kenalog.  A sterile bandaid was applied.  The patient was informed to apply an ice pack approximately 10min once arriving home and not to do anything strenuous for 24hours.  She was instructed to call if there were any problems. The patient was discharged in stable condition

## 2023-08-22 NOTE — PROGRESS NOTES
Subjective:       Patient ID: Brianne Martin is a 78 y.o. female.    Chief Complaint: Osteoporosis    Brianne Martin  is a 78 y.o. female seen today for f/u OP on prolia.  No falls or fractures since last office visit.  She tolerates Prolia well.  Previously was on oral bisphosphonates.  Takes over-the-counter calcium and vitamin-D supplementation.  Pt unclear as to how much she takes of either supplement.  No recent invasive dental procedures.  She has no upcoming invasive dental procedures planned.  She has steady gait.  Currently on prilosec for heartburn.  It is well-controlled.      Today she is complaining of bilateral knee pain.  Been going on for a few months.  Corey had steroid injection on the left knee by Dr. Briggs about a year ago.  She responded very well to that.  She would like to know if she can get it again.  She has diabetes.  Most recent hemoglobin A1c showed good glycemic control at 6.6%.    Current Tx:   Prolia - due today  OTC calcium one per day per report  OTC Vit D  Previous Tx:  Fosamax  GERD: yes - controlled on prilosec   Gait:  steady  Dental:  none recent and none planned  History of Fragility fracture: no    Rheumatologic systems otherwise negative.      Current Outpatient Medications:     alirocumab (PRALUENT PEN) 150 mg/mL PnIj, Inject 1 mL (150 mg total) into the skin every 14 (fourteen) days., Disp: 6 mL, Rfl: 3    amLODIPine (NORVASC) 10 MG tablet, Take 1 tablet (10 mg total) by mouth every morning., Disp: 90 tablet, Rfl: 2    aspirin (ECOTRIN) 81 MG EC tablet, , Disp: , Rfl:     blood sugar diagnostic (TRUE METRIX GLUCOSE TEST STRIP) Strp, Use to test blood glucose three (3) times a day, to be used with insurance-preferred brand of glucometer/supplies., Disp: 300 strip, Rfl: 3    blood-glucose meter kit, To check BG 3 times daily, to use with insurance preferred meter, Disp: 1 each, Rfl: 0    cholecalciferol, vitamin D3, 125 mcg (5,000 unit) capsule, Take 1 capsule (5,000 Units  "total) by mouth once daily., Disp: 90 capsule, Rfl: 3    conjugated estrogens (PREMARIN) vaginal cream, Apply a pea sized amount PV QHS x 2 weeks. Then, apply 3 times a week., Disp: 30 g, Rfl: 3    dulaglutide (TRULICITY) 3 mg/0.5 mL pen injector, Inject 3 mg into the skin every 7 days., Disp: 12 pen, Rfl: 3    fluticasone propionate (FLONASE) 50 mcg/actuation nasal spray, 1 spray (50 mcg total) by Each Nostril route once daily., Disp: 16 g, Rfl: 11    gabapentin (NEURONTIN) 300 MG capsule, Take 1 capsule (300 mg total) by mouth 2 (two) times daily., Disp: 180 capsule, Rfl: 3    glucagon (GLUCAGON, HUMAN RECOMBINANT,) 1 mg Kit, Inject 1 mg into the muscle as needed., Disp: , Rfl:     insulin aspart U-100 (NOVOLOG FLEXPEN U-100 INSULIN) 100 unit/mL (3 mL) InPn pen, Inject 20 Units into the skin 3 (three) times daily with meals., Disp: 60 mL, Rfl: 0    insulin needles, disposable, (BD INSULIN PEN NEEDLE UF MINI) 31 x 3/16 " Ndle, Inject 1 Units into the skin every evening., Disp: 100 each, Rfl: 3    lancets (TRUEPLUS LANCETS) 33 gauge Misc, Use to test blood glucose three (3) times a day, discard lancet after each use, Disp: 300 each, Rfl: 3    latanoprost 0.005 % ophthalmic solution, INSTILL 1 DROP INTO BOTH EYES EVERY EVENING, Disp: 5 mL, Rfl: 0    levocetirizine (XYZAL) 5 MG tablet, Take 1 tablet (5 mg total) by mouth every evening., Disp: 90 tablet, Rfl: 1    meloxicam (MOBIC) 7.5 MG tablet, TAKE ONE TABLET EVERY DAY (MEDICINE PAIN), Disp: , Rfl:     metFORMIN (GLUCOPHAGE) 1000 MG tablet, TAKE 1 TABLET TWICE DAILY WITH MEALS, Disp: 180 tablet, Rfl: 0    multivitamin Liqd, Take by mouth., Disp: , Rfl:     omega-3 acid ethyl esters (LOVAZA) 1 gram capsule, Take 2 capsules (2 g total) by mouth 2 (two) times daily., Disp: 360 capsule, Rfl: 1    omega-3 fatty acids/fish oil (FISH OIL-OMEGA-3 FATTY ACIDS) 300-1,000 mg capsule, Take 2 capsules by mouth 2 (two) times a day., Disp: 360 capsule, Rfl: 1    omeprazole " (PRILOSEC) 40 MG capsule, TAKE 1 CAPSULE EVERY MORNING, Disp: 90 capsule, Rfl: 2    simvastatin (ZOCOR) 40 MG tablet, Take 40 mg by mouth., Disp: , Rfl:     triamcinolone acetonide 0.1% (KENALOG) 0.1 % cream, Apply topically 2 (two) times daily., Disp: , Rfl:     valACYclovir (VALTREX) 1000 MG tablet, Take 1,000 mg by mouth 3 (three) times daily., Disp: , Rfl:     venlafaxine (EFFEXOR) 75 MG tablet, TAKE 1 TABLET TWICE DAILY, Disp: 180 tablet, Rfl: 2    VITAMIN D2 1,250 mcg (50,000 unit) capsule, TAKE 1 CAPSULE EVERY WEEK (MEDICINE AS VITAMIN, Disp: , Rfl:     white petrolatum-mineral oil 57.3-42.5% (REFRESH P.M.) 57.3-42.5 % Oint, Place into the left eye every evening., Disp: 3.5 g, Rfl: 2    clopidogreL (PLAVIX) 75 mg tablet, Take 1 tablet (75 mg total) by mouth once daily., Disp: 90 tablet, Rfl: 2    cycloSPORINE (RESTASIS) 0.05 % ophthalmic emulsion, Place 1 drop into both eyes 2 (two) times daily., Disp: 180 vial, Rfl: 2    losartan (COZAAR) 100 MG tablet, TAKE ONE TABLET EVERY DAY (FOR BLOOD PRESSURE) Strength: 100 mg, Disp: 90 tablet, Rfl: 2    olopatadine (PAZEO) 0.7 % Drop, Place 1 drop into both eyes every morning., Disp: 5 mL, Rfl: 3  No current facility-administered medications for this visit.  Past Medical History:   Diagnosis Date    Arthritis 12/10/2014    Bell's palsy 02/03/2022    Elevated LFTs 2012    negative workup    Hyperlipidemia LDL goal <100     Hypertension     Mixed anxiety and depressive disorder     Osteoporosis, postmenopausal     Overweight(278.02)     Reflux     Traumatic closed displaced bimalleolar fracture of right ankle 12/25/2013    Type II or unspecified type diabetes mellitus without mention of complication, uncontrolled     Vitamin D deficiency disease      Family History   Problem Relation Age of Onset    Hypertension Other     Diabetes Other     Cataracts Father     No Known Problems Mother     No Known Problems Sister     No Known Problems Brother     No Known Problems  Maternal Aunt     No Known Problems Maternal Uncle     No Known Problems Paternal Aunt     No Known Problems Paternal Uncle     No Known Problems Maternal Grandmother     No Known Problems Maternal Grandfather     No Known Problems Paternal Grandmother     No Known Problems Paternal Grandfather     Amblyopia Neg Hx     Blindness Neg Hx     Cancer Neg Hx     Glaucoma Neg Hx     Macular degeneration Neg Hx     Retinal detachment Neg Hx     Strabismus Neg Hx     Stroke Neg Hx     Thyroid disease Neg Hx      Social History     Socioeconomic History    Marital status:     Number of children: 8   Occupational History    Occupation: Retired   Tobacco Use    Smoking status: Never    Smokeless tobacco: Never   Substance and Sexual Activity    Alcohol use: Yes     Comment: socially    Drug use: Never   Social History Narrative    1 child  at age 7 of an illness.     Social Determinants of Health     Financial Resource Strain: Low Risk  (2022)    Overall Financial Resource Strain (CARDIA)     Difficulty of Paying Living Expenses: Not very hard   Food Insecurity: No Food Insecurity (2022)    Hunger Vital Sign     Worried About Running Out of Food in the Last Year: Never true     Ran Out of Food in the Last Year: Never true   Transportation Needs: Unmet Transportation Needs (2022)    PRAPARE - Transportation     Lack of Transportation (Medical): Yes     Lack of Transportation (Non-Medical): Yes   Physical Activity: Inactive (2022)    Exercise Vital Sign     Days of Exercise per Week: 0 days     Minutes of Exercise per Session: 0 min   Stress: Stress Concern Present (2022)    Citizen of [place] of Occupational Health - Occupational Stress Questionnaire     Feeling of Stress : To some extent   Social Connections: Unknown (2022)    Social Connection and Isolation Panel [NHANES]     Frequency of Communication with Friends and Family: Three times a week     Frequency of Social Gatherings with  "Friends and Family: Once a week     Active Member of Clubs or Organizations: No     Attends Club or Organization Meetings: Never     Marital Status:    Housing Stability: Low Risk  (2/1/2022)    Housing Stability Vital Sign     Unable to Pay for Housing in the Last Year: No     Number of Places Lived in the Last Year: 2     Unstable Housing in the Last Year: No     Review of patient's allergies indicates:   Allergen Reactions    Ace inhibitors      Other reaction(s): cough       Objective:   /61   Pulse 87   Ht 5' 2" (1.575 m)   Wt 60.8 kg (134 lb 0.6 oz)   BMI 24.52 kg/m²   Immunization History   Administered Date(s) Administered    COVID-19, MRNA, LN-S, PF (MODERNA FULL 0.5 ML DOSE) 02/20/2021, 03/20/2021, 10/25/2021    COVID-19, MRNA, LN-S, PF (Pfizer) (Gray Cap) 05/20/2022    COVID-19, MRNA, LN-S, PF (Pfizer) (Purple Cap) 01/24/2021, 02/17/2021, 10/01/2021    COVID-19, mRNA, LNP-S, bivalent booster, PF (Moderna Omicron) 12/14/2022    Influenza (FLUAD) - Quadrivalent - Adjuvanted - PF *Preferred* (65+) 09/25/2020    Influenza - High Dose - PF (65 years and older) 01/07/2013, 12/10/2014, 10/18/2015    Influenza - Quadrivalent - High Dose - PF (65 years and older) 09/16/2020, 09/27/2021, 08/22/2022    Influenza - Quadrivalent - PF *Preferred* (6 months and older) 10/19/2012, 09/27/2013, 10/30/2013    Influenza - Trivalent (ADULT) 10/19/2009, 10/25/2010, 10/19/2011, 10/19/2012    Influenza - Trivalent - PF (ADULT) 09/27/2013    Influenza Split 10/30/2013    Pneumococcal Conjugate - 13 Valent 11/29/2015, 05/24/2021    Pneumococcal Polysaccharide - 23 Valent 05/03/2010    Tdap 11/05/2013, 11/29/2015    Zoster 10/18/2015    Zoster Recombinant 03/09/2020, 11/09/2020       Physical Exam   Constitutional: She is oriented to person, place, and time. She appears well-developed and well-nourished.   HENT:   Head: Normocephalic and atraumatic.   Mouth/Throat: Mucous membranes are normal. No oral lesions. No " "dental abscesses.   Pulmonary/Chest: Effort normal.   Neurological: She is alert and oriented to person, place, and time.   Skin: Skin is warm and dry. No rash noted.   Psychiatric: Her behavior is normal.   Nursing note and vitals reviewed.    B/l knee exam  SATYA  Good ROM  No effusion  TTP laterally R>L knee  Mild ttp medially left knee     Recent Results (from the past 336 hour(s))   Vitamin D    Collection Time: 08/17/23  8:30 AM   Result Value Ref Range    Vit D, 25-Hydroxy 49 30 - 96 ng/mL   PTH, Intact    Collection Time: 08/17/23  8:30 AM   Result Value Ref Range    PTH, Intact 69.5 9.0 - 77.0 pg/mL   Comprehensive Metabolic Panel    Collection Time: 08/17/23  8:30 AM   Result Value Ref Range    Sodium 143 136 - 145 mmol/L    Potassium 3.9 3.5 - 5.1 mmol/L    Chloride 108 95 - 110 mmol/L    CO2 26 23 - 29 mmol/L    Glucose 183 (H) 70 - 110 mg/dL    BUN 11 8 - 23 mg/dL    Creatinine 0.7 0.5 - 1.4 mg/dL    Calcium 9.3 8.7 - 10.5 mg/dL    Total Protein 7.2 6.0 - 8.4 g/dL    Albumin 3.9 3.5 - 5.2 g/dL    Total Bilirubin 0.3 0.1 - 1.0 mg/dL    Alkaline Phosphatase 64 55 - 135 U/L    AST 28 10 - 40 U/L    ALT 22 10 - 44 U/L    eGFR >60 >60 mL/min/1.73 m^2    Anion Gap 9 8 - 16 mmol/L   Vitamin D    Collection Time: 08/17/23  8:30 AM   Result Value Ref Range    Vit D, 25-Hydroxy 47 30 - 96 ng/mL     Hgb A1c also personally reviewed - 4/2023 - 6.6%    No results found for: "TBGOLDPLUS"   Lab Results   Component Value Date    HEPAIGM Negative 02/19/2013    HEPBIGM Negative 02/19/2013    HEPCAB Negative 02/19/2013        DEXA - I have personally reviewed results from 8/17/23  DXA Bone Density Spine And Hip  Narrative: EXAMINATION:  DXA BONE DENSITY AXIAL SKELETON 1 OR MORE SITES    CLINICAL HISTORY:  OP; Age-related osteoporosis without current pathological fracture    TECHNIQUE:  DXA scanning was performed over the left hip and lumbar spine.  Review of the images confirms satisfactory positioning and " technique.    COMPARISON:  02/07/2022    FINDINGS:  The L1 to L4 vertebral bone mineral density is equal to 0.837 g/cm squared with a T score of -2.9.  There has been no significant change relative to the prior study.    The left femoral neck bone mineral density is equal to 0.586 g/cm squared with a T score of -3.2.  There has been  a -15.9% statistically significant change relative to the prior study.  Impression: Osteoporosis    Consider FDA approved medical therapies in postmenopausal women and men aged 50 years and older, based on the following:    *A hip or vertebral (clinical or morphometric) fracture  *T score less than or equal to -2.5 at the femoral neck or spine after appropriate evaluation to exclude secondary causes.  *Low bone mass -- also known as osteopenia (T score between -1.0 and -2.5 at the femoral neck or spine) and a 10 year probability of hip fracture greater than or equal to 3% or a 10 year probability of major osteoporosis-related fracture greater than or equal to 20% based on the [place]-adapted WHO algorithm.  *Clinicians judgment and/or patient preference may indicate treatment for people with 10 year fracture probabilities is above or below these levels.    Electronically signed by: Eddie Aguilera DO  Date:    08/17/2023  Time:    12:08        Assessment:     1. Osteoporosis, postmenopausal    2. Medication monitoring encounter    3. Chronic pain of both knees    4. Type 2 diabetes mellitus with hyperglycemia, with long-term current use of insulin              Plan:     Brianne was seen today for osteoporosis.    Diagnoses and all orders for this visit:    Osteoporosis, postmenopausal  -     Discontinue: abaloparatide (TYMLOS) 80 mcg (3,120 mcg/1.56 mL) PnIj; Inject 80 mcg into the skin once daily.  -     Cancel: denosumab (PROLIA) injection 60 mg    Medication monitoring encounter    Chronic pain of both knees  -     X-ray Knee Ortho Bilateral with Flexion; Future  -     Large Joint  Aspiration/Injection: bilateral knee    Type 2 diabetes mellitus with hyperglycemia, with long-term current use of insulin    Other orders  -     denosumab (PROLIA) injection 60 mg          Osteoporosis  Calcium 9.3 - WNL  GFR > 60  Discussed Evenity injections  No h/o MI, CVA, TIA, PE, DVT  Cost prohibitive for pt  C/w prolia today  No contra indication to Prolia today  Vit D 47 - c/w current supplementce   Patient instructed to notify the office if he/she has any new falls or new fractures  Discussed risks associated w Prolia including but not limited to hypocalcemia, ONJ, AFF  DEXA reviewed from 8/17/23 - spoke to Dr. DAYANA lockhart in regards to the significant decline in BMD from dexa 2/2022  positional change compared to prior testing - percent decline likely inaccurate  Alk phos has shown slight decline while on Prolia  No fracture even w recent fall  Repeat DEXA in one year 8/2024  Chronic bilateral knee pain  Tender to palpation lateral compartment bilaterally  Has improved from injections in the past  Discussed risks and benefits of corticosteroid injection today  Patient wishes to proceed  She will monitor blood sugars closely.  If she sees elevations above 250-300 she will notify primary care for appropriate diabetes management  Xrays on way out  Return to clinic: 6mos - CMP, Vit D 1 week prior    Follow up in about 6 months (around 2/22/2024).    The patient understands, chooses and consents to this plan and accepts all the risks which include but are not limited to the risks mentioned above.     Disclaimer: This note was prepared using a voice recognition system and is likely to have sound alike errors within the text.

## 2023-09-22 ENCOUNTER — PATIENT MESSAGE (OUTPATIENT)
Dept: ADMINISTRATIVE | Facility: CLINIC | Age: 78
End: 2023-09-22
Payer: MEDICARE

## 2023-09-26 RX ORDER — SIMVASTATIN 40 MG/1
40 TABLET, FILM COATED ORAL DAILY
Qty: 30 TABLET | Refills: 11 | OUTPATIENT
Start: 2023-09-26

## 2023-10-07 DIAGNOSIS — K21.9 GASTROESOPHAGEAL REFLUX DISEASE, UNSPECIFIED WHETHER ESOPHAGITIS PRESENT: ICD-10-CM

## 2023-10-07 NOTE — TELEPHONE ENCOUNTER
No care due was identified.  Health [facility] Embedded Care Due Messages. Reference number: [id number].   10/07/2023 10:27:46 AM CDT

## 2023-10-09 DIAGNOSIS — E11.65 TYPE 2 DIABETES MELLITUS WITH HYPERGLYCEMIA, WITH LONG-TERM CURRENT USE OF INSULIN: ICD-10-CM

## 2023-10-09 DIAGNOSIS — Z79.4 TYPE 2 DIABETES MELLITUS WITH HYPERGLYCEMIA, WITH LONG-TERM CURRENT USE OF INSULIN: ICD-10-CM

## 2023-10-09 RX ORDER — OMEPRAZOLE 40 MG/1
40 CAPSULE, DELAYED RELEASE ORAL EVERY MORNING
Qty: 90 CAPSULE | Refills: 2 | Status: SHIPPED | OUTPATIENT
Start: 2023-10-09 | End: 2023-12-01 | Stop reason: SDUPTHER

## 2023-10-09 RX ORDER — INSULIN ASPART 100 [IU]/ML
INJECTION, SOLUTION INTRAVENOUS; SUBCUTANEOUS
Qty: 60 ML | Refills: 0 | Status: SHIPPED | OUTPATIENT
Start: 2023-10-09 | End: 2023-12-01

## 2023-10-09 NOTE — TELEPHONE ENCOUNTER
Refill Decision Note   Brianne Veronica  is requesting a refill authorization.  Brief Assessment and Rationale for Refill:  Approve     Medication Therapy Plan:         Comments:     Note composed:7:01 AM 10/09/2023

## 2023-10-09 NOTE — TELEPHONE ENCOUNTER
No care due was identified.  Health [facility] Embedded Care Due Messages. Reference number: [id number].   10/09/2023 2:39:03 AM CDT

## 2023-10-28 DIAGNOSIS — Z79.4 TYPE 2 DIABETES MELLITUS WITHOUT COMPLICATION, WITH LONG-TERM CURRENT USE OF INSULIN: ICD-10-CM

## 2023-10-28 DIAGNOSIS — E78.5 HYPERLIPIDEMIA ASSOCIATED WITH TYPE 2 DIABETES MELLITUS: Primary | ICD-10-CM

## 2023-10-28 DIAGNOSIS — E11.69 HYPERLIPIDEMIA ASSOCIATED WITH TYPE 2 DIABETES MELLITUS: Primary | ICD-10-CM

## 2023-10-28 DIAGNOSIS — E11.9 TYPE 2 DIABETES MELLITUS WITHOUT COMPLICATION, WITH LONG-TERM CURRENT USE OF INSULIN: ICD-10-CM

## 2023-10-28 NOTE — TELEPHONE ENCOUNTER
Care Due:                  Date            Visit Type   Department     Provider  --------------------------------------------------------------------------------                                EP -                              PRIMARY      [facility] PRIMARY  Last Visit: 01-      CARE ([facility])   ULICES Tran  Next Visit: None Scheduled  None         None Found                                                            Last  Test          Frequency    Reason                     Performed    Due Date  --------------------------------------------------------------------------------    HBA1C.......  6 months...  NOVOLOG, dulaglutide,      04-   10-                             metFORMIN................    Lipid Panel.  12 months..  omega-3..................  06- 05-    Health [facility] Embedded Care Due Messages. Reference number: [id number].   10/28/2023 2:46:29 AM CDT

## 2023-10-29 NOTE — TELEPHONE ENCOUNTER
Refill Routing Note   Medication(s) are not appropriate for processing by [facility] for the following reason(s):      Required labs outdated: a1c    [facility] action(s):  Defer Care Due:  Appointment due  Labs due   Medication Therapy Plan:  Labs (Lipid panel, a1c) outdated, not part of scheduled panel, Pended labs utilizing [facility] ([facility]) tab due to extra training from LPN      Appointments  past 12m or future 3m with PCP    Date Provider   Last Visit   1/25/2023 Hardy Tran MD   Next Visit   Visit date not found Hardy Tran MD   ED visits in past 90 days: 0        Note composed:7:02 PM 10/28/2023

## 2023-10-30 RX ORDER — METFORMIN HYDROCHLORIDE 1000 MG/1
1000 TABLET ORAL 2 TIMES DAILY WITH MEALS
Qty: 180 TABLET | Refills: 0 | Status: SHIPPED | OUTPATIENT
Start: 2023-10-30 | End: 2023-12-01 | Stop reason: SDUPTHER

## 2023-11-03 ENCOUNTER — OFFICE VISIT (OUTPATIENT)
Dept: UROLOGY | Facility: CLINIC | Age: 78
End: 2023-11-03
Payer: MEDICARE

## 2023-11-03 VITALS
SYSTOLIC BLOOD PRESSURE: 129 MMHG | TEMPERATURE: 98 F | RESPIRATION RATE: 16 BRPM | HEART RATE: 92 BPM | HEIGHT: 62 IN | BODY MASS INDEX: 23.53 KG/M2 | DIASTOLIC BLOOD PRESSURE: 71 MMHG | WEIGHT: 127.88 LBS

## 2023-11-03 DIAGNOSIS — N39.0 FREQUENT UTI: ICD-10-CM

## 2023-11-03 DIAGNOSIS — K64.9 HEMORRHOIDS, UNSPECIFIED HEMORRHOID TYPE: ICD-10-CM

## 2023-11-03 DIAGNOSIS — N95.8 GENITOURINARY SYNDROME OF MENOPAUSE: Primary | ICD-10-CM

## 2023-11-03 PROCEDURE — 99215 OFFICE O/P EST HI 40 MIN: CPT | Mod: PBBFAC,PN | Performed by: UROLOGY

## 2023-11-03 PROCEDURE — 99214 OFFICE O/P EST MOD 30 MIN: CPT | Mod: HCNC,S$GLB,, | Performed by: UROLOGY

## 2023-11-03 PROCEDURE — 99999 PR PBB SHADOW E&M-EST. PATIENT-LVL V: ICD-10-PCS | Mod: PBBFAC,HCNC,, | Performed by: UROLOGY

## 2023-11-03 PROCEDURE — 99214 PR OFFICE/OUTPT VISIT, EST, LEVL IV, 30-39 MIN: ICD-10-PCS | Mod: HCNC,S$GLB,, | Performed by: UROLOGY

## 2023-11-03 PROCEDURE — 99999 PR PBB SHADOW E&M-EST. PATIENT-LVL V: CPT | Mod: PBBFAC,HCNC,, | Performed by: UROLOGY

## 2023-11-03 NOTE — PROGRESS NOTES
Chief Complaint   Patient presents with    Genitourinary syndrome of menopause       History of Present Illness:   Brianne Martin is a 78 y.o. female here for evaluation of Genitourinary syndrome of menopause    11/3/23-no recent urgent care or ED visits. No dysuria. Using vag est about once a week. No gross hematuria. She reports that she has something poking out of her rectum, maybe a hemorrhoid. She has been using hemorrhoid cream but it is still bothering her. Doesn't always poke out.   4/28/23-77yo female, here for evaluation of frequent UTI.  There is 1 culture from 6/2022 on file, which grew E. Coli, resistant to ampicillin and fluoroquinolones.  She had a negative culture at [facility] 10/2022. She was seen at \[facility]\"" Urgent care 1 month ago and prescribed Keflex bid x 7 days.   When she has a UTI, she experiences dysuria. She denies gross hematuria or frequency. Urine also smells foul. No associated fever, but she does have bilateral flank pain. Seems to happen 2-3 times a year. Symptoms generally improve with antibiotics. Ongoing about 10 years. Pt had 7 vaginal deliveries.         Review of Systems   Constitutional:  Negative for chills and fever.   Gastrointestinal:  Positive for constipation.   Genitourinary:  Negative for frequency and hematuria.   All other systems reviewed and are negative.        Past Medical History:   Diagnosis Date    Arthritis 12/10/2014    Bell's palsy 02/03/2022    Elevated LFTs 2012    negative workup    Hyperlipidemia LDL goal <100     Hypertension     Mixed anxiety and depressive disorder     Osteoporosis, postmenopausal     Overweight(278.02)     Reflux     Traumatic closed displaced bimalleolar fracture of right ankle 12/25/2013    Type II or unspecified type diabetes mellitus without mention of complication, uncontrolled     Vitamin D deficiency disease        Past Surgical History:   Procedure Laterality Date    APPENDECTOMY      CATARACT EXTRACTION      ou    COLONOSCOPY N/A  04/21/2021    Procedure: COLONOSCOPY;  Surgeon: Shanice Garcia MD;  Location: [facility];  Service: Endoscopy;  Laterality: N/A;    ORIF right ankle  12/01/2013    TUBAL LIGATION         Family History   Problem Relation Age of Onset    Hypertension Other     Diabetes Other     Cataracts Father     No Known Problems Mother     No Known Problems Sister     No Known Problems Brother     No Known Problems Maternal Aunt     No Known Problems Maternal Uncle     No Known Problems Paternal Aunt     No Known Problems Paternal Uncle     No Known Problems Maternal Grandmother     No Known Problems Maternal Grandfather     No Known Problems Paternal Grandmother     No Known Problems Paternal Grandfather     Amblyopia Neg Hx     Blindness Neg Hx     Cancer Neg Hx     Glaucoma Neg Hx     Macular degeneration Neg Hx     Retinal detachment Neg Hx     Strabismus Neg Hx     Stroke Neg Hx     Thyroid disease Neg Hx        Social History     Tobacco Use    Smoking status: Never    Smokeless tobacco: Never   Substance Use Topics    Alcohol use: Yes     Comment: socially    Drug use: Never       Current Outpatient Medications   Medication Sig Dispense Refill    alirocumab (PRALUENT PEN) 150 mg/mL PnIj Inject 1 mL (150 mg total) into the skin every 14 (fourteen) days. 6 mL 3    aspirin (ECOTRIN) 81 MG EC tablet       blood sugar diagnostic (TRUE METRIX GLUCOSE TEST STRIP) Strp Use to test blood glucose three (3) times a day, to be used with insurance-preferred brand of glucometer/supplies. 300 strip 3    blood-glucose meter kit To check BG 3 times daily, to use with insurance preferred meter 1 each 0    cholecalciferol, vitamin D3, 125 mcg (5,000 unit) capsule Take 1 capsule (5,000 Units total) by mouth once daily. 90 capsule 3    dulaglutide (TRULICITY) 3 mg/0.5 mL pen injector Inject 3 mg into the skin every 7 days. 12 pen 3    fluticasone propionate (FLONASE) 50 mcg/actuation nasal spray 1 spray (50 mcg total) by Each Nostril  "route once daily. 16 g 11    gabapentin (NEURONTIN) 300 MG capsule Take 1 capsule (300 mg total) by mouth 2 (two) times daily. 180 capsule 3    glucagon (GLUCAGON, HUMAN RECOMBINANT,) 1 mg Kit Inject 1 mg into the muscle as needed.      insulin needles, disposable, (BD INSULIN PEN NEEDLE UF MINI) 31 x 3/16 " Ndle Inject 1 Units into the skin every evening. 100 each 3    lancets (TRUEPLUS LANCETS) 33 gauge Misc Use to test blood glucose three (3) times a day, discard lancet after each use 300 each 3    latanoprost 0.005 % ophthalmic solution INSTILL 1 DROP INTO BOTH EYES EVERY EVENING 5 mL 0    levocetirizine (XYZAL) 5 MG tablet Take 1 tablet (5 mg total) by mouth every evening. 90 tablet 1    meloxicam (MOBIC) 7.5 MG tablet TAKE ONE TABLET EVERY DAY (MEDICINE PAIN)      metFORMIN (GLUCOPHAGE) 1000 MG tablet Take 1 tablet (1,000 mg total) by mouth 2 (two) times daily with meals. 180 tablet 0    multivitamin Liqd Take by mouth.      NOVOLOG FLEXPEN U-100 INSULIN 100 unit/mL (3 mL) InPn pen INJECT 20 UNITS UNDER THE SKIN 3 TIMES DAILY WITH MEALS (Patient not taking: Reported on 11/3/2023) 60 mL 0    omega-3 acid ethyl esters (LOVAZA) 1 gram capsule TAKE 2 CAPSULES TWICE DAILY 360 capsule 10    omega-3 fatty acids/fish oil (FISH OIL-OMEGA-3 FATTY ACIDS) 300-1,000 mg capsule Take 2 capsules by mouth 2 (two) times a day. 360 capsule 1    omeprazole (PRILOSEC) 40 MG capsule Take 1 capsule (40 mg total) by mouth every morning. 90 capsule 2    simvastatin (ZOCOR) 40 MG tablet Take 40 mg by mouth.      triamcinolone acetonide 0.1% (KENALOG) 0.1 % cream Apply topically 2 (two) times daily.      valACYclovir (VALTREX) 1000 MG tablet Take 1,000 mg by mouth 3 (three) times daily.      venlafaxine (EFFEXOR) 75 MG tablet TAKE 1 TABLET TWICE DAILY 180 tablet 2    VITAMIN D2 1,250 mcg (50,000 unit) capsule TAKE 1 CAPSULE EVERY WEEK (MEDICINE AS VITAMIN      white petrolatum-mineral oil 57.3-42.5% (REFRESH P.M.) 57.3-42.5 % Oint Place " into the left eye every evening. 3.5 g 2    amLODIPine (NORVASC) 10 MG tablet Take 1 tablet (10 mg total) by mouth every morning. 90 tablet 2    clopidogreL (PLAVIX) 75 mg tablet Take 1 tablet (75 mg total) by mouth once daily. 90 tablet 2    conjugated estrogens (PREMARIN) vaginal cream Apply a pea sized amount PV QHS x 2 weeks. Then, apply 3 times a week. 30 g 3    cycloSPORINE (RESTASIS) 0.05 % ophthalmic emulsion Place 1 drop into both eyes 2 (two) times daily. 180 vial 2    losartan (COZAAR) 100 MG tablet TAKE ONE TABLET EVERY DAY (FOR BLOOD PRESSURE)  Strength: 100 mg 90 tablet 2    olopatadine (PAZEO) 0.7 % Drop Place 1 drop into both eyes every morning. 5 mL 3     No current facility-administered medications for this visit.       Review of patient's allergies indicates:   Allergen Reactions    Ace inhibitors      Other reaction(s): cough       Physical Exam  Vitals:    11/03/23 1155   BP: 129/71   Pulse: 92   Resp: 16   Temp: 98.3 °F (36.8 °C)     General: Well-developed, well-nourished, in no acute distress  HEENT: Normocephalic, atraumatic, extraocular movements intact  Neck: Supple, no supraclavicular or cervical lymphadenopathy, trachea midline  Respirations: even and unlabored  Back: midline spine, No CVA tenderness  Abdomen: soft, Non-tender, non-distended, no palpable masses, no rebound or guarding  : 4/28/23-Normal external female genitalia without lesions. Orthotopic urethral meatus. atrophic vaginal mucosa. Grade 1 Cysotcele, Grade 1 Rectocele, negative cough stress test  Rectal: no external/anal abnormalities noted. Rectal exam notes probable soft internal hemorrhoid, no masses palpated.   Extremities: moves all equally, no clubbing, cyanosis or edema  Skin: Warm and dry. No lesions  Psych: normal affect  Neuro: Alert and oriented x 3. Cranial nerves II-XII intact    Urinalysis  pH, UA   Date Value Ref Range Status   06/02/2022 5  Final   12/26/2013 7.0 5.0 - 8.0 Final     Protein, UA   Date  Value Ref Range Status   12/26/2013 Negative Negative Final     Comment:     Recommend a 24 hour urine protein or a urine   protein/creatinine ratio if globulin induced proteinuria is  clinically suspected.     Glucose, UA   Date Value Ref Range Status   12/26/2013 3+ (A) Negative Final     Occult Blood UA   Date Value Ref Range Status   12/26/2013 1+ (A) Negative Final     Nitrite, UA   Date Value Ref Range Status   06/02/2022 POSITIVE  Final   12/26/2013 Negative Negative Final     Leukocytes, UA   Date Value Ref Range Status   12/26/2013 Negative Negative Final       Assessment:  1. Genitourinary syndrome of menopause        2. Frequent UTI        3. Hemorrhoids, unspecified hemorrhoid type  Ambulatory referral/consult to Gastroenterology              Plan:   Genitourinary syndrome of menopause    Frequent UTI    Hemorrhoids, unspecified hemorrhoid type  -     Ambulatory referral/consult to Gastroenterology; Future; Expected date: 11/14/2023    Other orders  -     conjugated estrogens (PREMARIN) vaginal cream; Apply a pea sized amount PV QHS x 2 weeks. Then, apply 3 times a week.  Dispense: 30 g; Refill: 3          Follow up in about 6 months (around 5/3/2024).

## 2023-12-01 ENCOUNTER — OFFICE VISIT (OUTPATIENT)
Dept: INTERNAL MEDICINE | Facility: CLINIC | Age: 78
End: 2023-12-01
Payer: MEDICARE

## 2023-12-01 ENCOUNTER — LAB VISIT (OUTPATIENT)
Dept: LAB | Facility: HOSPITAL | Age: 78
End: 2023-12-01
Attending: NURSE PRACTITIONER
Payer: MEDICARE

## 2023-12-01 VITALS
HEART RATE: 92 BPM | SYSTOLIC BLOOD PRESSURE: 126 MMHG | WEIGHT: 127 LBS | OXYGEN SATURATION: 97 % | DIASTOLIC BLOOD PRESSURE: 78 MMHG | BODY MASS INDEX: 23.23 KG/M2 | TEMPERATURE: 98 F

## 2023-12-01 DIAGNOSIS — E11.69 HYPERLIPIDEMIA ASSOCIATED WITH TYPE 2 DIABETES MELLITUS: ICD-10-CM

## 2023-12-01 DIAGNOSIS — I15.2 HYPERTENSION ASSOCIATED WITH DIABETES: Primary | ICD-10-CM

## 2023-12-01 DIAGNOSIS — E11.59 HYPERTENSION ASSOCIATED WITH DIABETES: Primary | ICD-10-CM

## 2023-12-01 DIAGNOSIS — E11.9 TYPE 2 DIABETES MELLITUS WITHOUT COMPLICATION, WITHOUT LONG-TERM CURRENT USE OF INSULIN: ICD-10-CM

## 2023-12-01 DIAGNOSIS — E11.9 TYPE 2 DIABETES MELLITUS WITHOUT COMPLICATION, WITH LONG-TERM CURRENT USE OF INSULIN: ICD-10-CM

## 2023-12-01 DIAGNOSIS — Z79.4 TYPE 2 DIABETES MELLITUS WITHOUT COMPLICATION, WITH LONG-TERM CURRENT USE OF INSULIN: ICD-10-CM

## 2023-12-01 DIAGNOSIS — E78.5 HYPERLIPIDEMIA ASSOCIATED WITH TYPE 2 DIABETES MELLITUS: ICD-10-CM

## 2023-12-01 DIAGNOSIS — K21.9 GASTROESOPHAGEAL REFLUX DISEASE, UNSPECIFIED WHETHER ESOPHAGITIS PRESENT: ICD-10-CM

## 2023-12-01 LAB
ESTIMATED AVG GLUCOSE: 134 MG/DL (ref 68–131)
HBA1C MFR BLD: 6.3 % (ref 4–5.6)

## 2023-12-01 PROCEDURE — 99999 PR PBB SHADOW E&M-EST. PATIENT-LVL III: ICD-10-PCS | Mod: PBBFAC,HCNC,, | Performed by: NURSE PRACTITIONER

## 2023-12-01 PROCEDURE — 99214 PR OFFICE/OUTPT VISIT, EST, LEVL IV, 30-39 MIN: ICD-10-PCS | Mod: HCNC,S$GLB,, | Performed by: NURSE PRACTITIONER

## 2023-12-01 PROCEDURE — 99999 PR PBB SHADOW E&M-EST. PATIENT-LVL III: CPT | Mod: PBBFAC,HCNC,, | Performed by: NURSE PRACTITIONER

## 2023-12-01 PROCEDURE — 99214 OFFICE O/P EST MOD 30 MIN: CPT | Mod: HCNC,S$GLB,, | Performed by: NURSE PRACTITIONER

## 2023-12-01 PROCEDURE — 83036 HEMOGLOBIN GLYCOSYLATED A1C: CPT | Mod: HCNC | Performed by: INTERNAL MEDICINE

## 2023-12-01 PROCEDURE — 36415 COLL VENOUS BLD VENIPUNCTURE: CPT | Mod: HCNC,PO | Performed by: INTERNAL MEDICINE

## 2023-12-01 PROCEDURE — 99213 OFFICE O/P EST LOW 20 MIN: CPT | Mod: PBBFAC,PO | Performed by: NURSE PRACTITIONER

## 2023-12-01 PROCEDURE — 80061 LIPID PANEL: CPT | Mod: HCNC | Performed by: INTERNAL MEDICINE

## 2023-12-01 RX ORDER — OMEPRAZOLE 40 MG/1
40 CAPSULE, DELAYED RELEASE ORAL EVERY MORNING
Qty: 90 CAPSULE | Refills: 2 | Status: SHIPPED | OUTPATIENT
Start: 2023-12-01

## 2023-12-01 RX ORDER — GABAPENTIN 300 MG/1
300 CAPSULE ORAL 2 TIMES DAILY
Qty: 180 CAPSULE | Refills: 3 | Status: SHIPPED | OUTPATIENT
Start: 2023-12-01 | End: 2024-03-05 | Stop reason: SDUPTHER

## 2023-12-01 RX ORDER — ICOSAPENT ETHYL 1000 MG/1
2 CAPSULE ORAL 2 TIMES DAILY
COMMUNITY
Start: 2023-11-16

## 2023-12-01 RX ORDER — INFLUENZA A VIRUS A/VICTORIA/4897/2022 IVR-238 (H1N1) ANTIGEN (FORMALDEHYDE INACTIVATED), INFLUENZA A VIRUS A/DARWIN/6/2021 IVR-227 (H3N2) ANTIGEN (FORMALDEHYDE INACTIVATED), INFLUENZA B VIRUS B/AUSTRIA/1359417/2021 BVR-26 ANTIGEN (FORMALDEHYDE INACTIVATED), INFLUENZA B VIRUS B/PHUKET/3073/2013 BVR-1B ANTIGEN (FORMALDEHYDE INACTIVATED) 15; 15; 15; 15 UG/.5ML; UG/.5ML; UG/.5ML; UG/.5ML
INJECTION, SUSPENSION INTRAMUSCULAR
COMMUNITY
Start: 2023-09-28 | End: 2024-04-03

## 2023-12-01 RX ORDER — ALENDRONATE SODIUM 70 MG/1
70 TABLET ORAL
COMMUNITY
Start: 2023-10-08 | End: 2023-12-01

## 2023-12-01 RX ORDER — CYCLOBENZAPRINE HCL 10 MG
10 TABLET ORAL NIGHTLY
COMMUNITY
Start: 2023-11-16

## 2023-12-01 RX ORDER — AMLODIPINE BESYLATE 10 MG/1
10 TABLET ORAL EVERY MORNING
Qty: 90 TABLET | Refills: 2 | Status: SHIPPED | OUTPATIENT
Start: 2023-12-01 | End: 2024-11-30

## 2023-12-01 RX ORDER — PROMETHAZINE HYDROCHLORIDE AND DEXTROMETHORPHAN HYDROBROMIDE 6.25; 15 MG/5ML; MG/5ML
SYRUP ORAL
COMMUNITY
Start: 2023-11-16

## 2023-12-01 RX ORDER — DULAGLUTIDE 3 MG/.5ML
3 INJECTION, SOLUTION SUBCUTANEOUS
Qty: 12 PEN | Refills: 3 | Status: SHIPPED | OUTPATIENT
Start: 2023-12-01

## 2023-12-01 RX ORDER — METFORMIN HYDROCHLORIDE 1000 MG/1
1000 TABLET ORAL 2 TIMES DAILY WITH MEALS
Qty: 180 TABLET | Refills: 0 | Status: SHIPPED | OUTPATIENT
Start: 2023-12-01

## 2023-12-01 RX ORDER — CLOPIDOGREL BISULFATE 75 MG/1
75 TABLET ORAL DAILY
Qty: 90 TABLET | Refills: 2 | Status: SHIPPED | OUTPATIENT
Start: 2023-12-01 | End: 2024-03-05 | Stop reason: SDUPTHER

## 2023-12-01 RX ORDER — LOSARTAN POTASSIUM 100 MG/1
TABLET ORAL
Qty: 90 TABLET | Refills: 2 | Status: SHIPPED | OUTPATIENT
Start: 2023-12-01 | End: 2024-03-05 | Stop reason: SDUPTHER

## 2023-12-01 NOTE — PROGRESS NOTES
Subjective:       Patient ID: Brianne Martin is a 78 y.o. female.    Chief Complaint: Medication Refill    Pt presents to clinic today for medication refills   Overall doing well  She is unsure of exactly she should be taking  Ran out of a few of her meds  Last A1c 6.6 - due for updated labs today  Taking metformin and trulicity     BP well controlled  No issues with her meds    Daughter reports she is not wanting to eat meat  Losing some weight  We discussed adding boost/ensure to help make sure she is getting her nutrients         /78   Pulse 92   Temp 97.9 °F (36.6 °C)   Wt 57.6 kg (126 lb 15.8 oz)   SpO2 97%   BMI 23.23 kg/m²     Review of Systems   Constitutional:  Negative for activity change, appetite change, chills, diaphoresis, fatigue, fever and unexpected weight change.   HENT: Negative.     Respiratory:  Negative for cough and shortness of breath.    Cardiovascular:  Negative for chest pain, palpitations and leg swelling.   Gastrointestinal: Negative.    Genitourinary: Negative.    Musculoskeletal:  Positive for arthralgias and myalgias.   Skin:  Negative for color change, pallor, rash and wound.        Breast pain/mass   Allergic/Immunologic: Negative for immunocompromised state.   Neurological: Negative.  Negative for dizziness and facial asymmetry.   Hematological:  Negative for adenopathy. Does not bruise/bleed easily.   Psychiatric/Behavioral:  Negative for agitation, behavioral problems and confusion.        Objective:      Physical Exam  Vitals and nursing note reviewed.   Constitutional:       General: She is not in acute distress.     Appearance: Normal appearance. She is well-developed. She is not diaphoretic.   HENT:      Head: Normocephalic and atraumatic.   Cardiovascular:      Rate and Rhythm: Normal rate and regular rhythm.      Heart sounds: Normal heart sounds. No murmur heard.  Pulmonary:      Effort: Pulmonary effort is normal. No respiratory distress.      Breath sounds:  Normal breath sounds.   Musculoskeletal:         General: Normal range of motion.   Skin:     General: Skin is warm and dry.      Findings: No rash.   Neurological:      Mental Status: She is alert.      Motor: Weakness present.      Coordination: Abnormal coordination: wheelchair.   Psychiatric:         Mood and Affect: Mood normal.         Behavior: Behavior normal. Behavior is cooperative.         Thought Content: Thought content normal.         Judgment: Judgment normal.         Assessment:       1. Hypertension associated with diabetes    2. Gastroesophageal reflux disease, unspecified whether esophagitis present    3. Type 2 diabetes mellitus without complication, without long-term current use of insulin    4. BMI 23.0-23.9, adult        Plan:       Brianne was seen today for medication refill.    Diagnoses and all orders for this visit:    Hypertension associated with diabetes  -     amLODIPine (NORVASC) 10 MG tablet; Take 1 tablet (10 mg total) by mouth every morning.  -     clopidogreL (PLAVIX) 75 mg tablet; Take 1 tablet (75 mg total) by mouth once daily.  -     losartan (COZAAR) 100 MG tablet; TAKE ONE TABLET EVERY DAY (FOR BLOOD PRESSURE)  Strength: 100 mg    Gastroesophageal reflux disease, unspecified whether esophagitis present  -     omeprazole (PRILOSEC) 40 MG capsule; Take 1 capsule (40 mg total) by mouth every morning.    Type 2 diabetes mellitus without complication, without long-term current use of insulin  -     gabapentin (NEURONTIN) 300 MG capsule; Take 1 capsule (300 mg total) by mouth 2 (two) times daily.  -     dulaglutide (TRULICITY) 3 mg/0.5 mL pen injector; Inject 3 mg into the skin every 7 days.  -     metFORMIN (GLUCOPHAGE) 1000 MG tablet; Take 1 tablet (1,000 mg total) by mouth 2 (two) times daily with meals.  -     Microalbumin/Creatinine Ratio, Urine; Future    BMI 23.0-23.9, adult      Update A1c today  Continue meds as prescribed  Follow up for worsening or no improvement in symptoms  and PRN.

## 2023-12-02 LAB
CHOLEST SERPL-MCNC: 152 MG/DL (ref 120–199)
CHOLEST/HDLC SERPL: 3.9 {RATIO} (ref 2–5)
HDLC SERPL-MCNC: 39 MG/DL (ref 40–75)
HDLC SERPL: 25.7 % (ref 20–50)
LDLC SERPL CALC-MCNC: 82.8 MG/DL (ref 63–159)
NONHDLC SERPL-MCNC: 113 MG/DL
TRIGL SERPL-MCNC: 151 MG/DL (ref 30–150)

## 2023-12-27 ENCOUNTER — PATIENT OUTREACH (OUTPATIENT)
Dept: ADMINISTRATIVE | Facility: HOSPITAL | Age: 78
End: 2023-12-27
Payer: MEDICARE

## 2023-12-27 NOTE — PROGRESS NOTES
[employer] Attestation [facility] - Patient had previous eGFR done on 8.17.23 and had microalbumin done on 12.1.23

## 2024-01-11 RX ORDER — VENLAFAXINE 75 MG/1
75 TABLET ORAL 2 TIMES DAILY
Qty: 180 TABLET | Refills: 0 | Status: SHIPPED | OUTPATIENT
Start: 2024-01-11

## 2024-01-11 NOTE — TELEPHONE ENCOUNTER
Refill Routing Note   Medication(s) are not appropriate for processing by [facility] for the following reason(s):        Drug-disease interaction: venlafaxine and Hypertension associated with diabetes  [provider has not previously overridden]    [facility] action(s):  Defer        Medication Therapy Plan:       Pharmacist review requested: Yes     Appointments  past 12m or future 3m with PCP    Date Provider   Last Visit   1/25/2023 Hardy Tran MD   Next Visit   4/3/2024 Hardy Tran MD   ED visits in past 90 days: 0        Note composed:10:24 AM 01/11/2024

## 2024-01-11 NOTE — TELEPHONE ENCOUNTER
Refill Decision Note   Brianne Veronica  is requesting a refill authorization.  Brief Assessment and Rationale for Refill:  Approve     Medication Therapy Plan:  FOVS 4/3/24      Pharmacist review requested: Yes   Extended chart review required: Yes   Comments:     Note composed:4:02 PM 01/11/2024

## 2024-01-11 NOTE — TELEPHONE ENCOUNTER
No care due was identified.  [facility] Embedded Care Due Messages. Reference number: [id number].   1/11/2024 2:26:38 AM CST

## 2024-02-20 ENCOUNTER — PATIENT MESSAGE (OUTPATIENT)
Dept: INTERNAL MEDICINE | Facility: CLINIC | Age: 79
End: 2024-02-20
Payer: MEDICARE

## 2024-03-04 ENCOUNTER — PATIENT MESSAGE (OUTPATIENT)
Dept: RHEUMATOLOGY | Facility: CLINIC | Age: 79
End: 2024-03-04
Payer: MEDICARE

## 2024-03-04 ENCOUNTER — PATIENT MESSAGE (OUTPATIENT)
Dept: INTERNAL MEDICINE | Facility: CLINIC | Age: 79
End: 2024-03-04
Payer: MEDICARE

## 2024-03-04 DIAGNOSIS — E11.59 HYPERTENSION ASSOCIATED WITH DIABETES: ICD-10-CM

## 2024-03-04 DIAGNOSIS — I15.2 HYPERTENSION ASSOCIATED WITH DIABETES: ICD-10-CM

## 2024-03-05 ENCOUNTER — PATIENT MESSAGE (OUTPATIENT)
Dept: INTERNAL MEDICINE | Facility: CLINIC | Age: 79
End: 2024-03-05
Payer: MEDICARE

## 2024-03-05 DIAGNOSIS — E11.9 TYPE 2 DIABETES MELLITUS WITHOUT COMPLICATION, WITHOUT LONG-TERM CURRENT USE OF INSULIN: ICD-10-CM

## 2024-03-05 DIAGNOSIS — E11.59 HYPERTENSION ASSOCIATED WITH DIABETES: ICD-10-CM

## 2024-03-05 DIAGNOSIS — I15.2 HYPERTENSION ASSOCIATED WITH DIABETES: ICD-10-CM

## 2024-03-05 RX ORDER — LOSARTAN POTASSIUM 100 MG/1
TABLET ORAL
Qty: 90 TABLET | Refills: 2 | Status: SHIPPED | OUTPATIENT
Start: 2024-03-05 | End: 2024-06-05

## 2024-03-05 RX ORDER — CLOPIDOGREL BISULFATE 75 MG/1
75 TABLET ORAL DAILY
Qty: 90 TABLET | Refills: 2 | Status: SHIPPED | OUTPATIENT
Start: 2024-03-05 | End: 2024-04-03

## 2024-03-05 RX ORDER — VENLAFAXINE 75 MG/1
75 TABLET ORAL 2 TIMES DAILY
Qty: 180 TABLET | Refills: 0 | Status: SHIPPED | OUTPATIENT
Start: 2024-03-05

## 2024-03-05 RX ORDER — GABAPENTIN 300 MG/1
300 CAPSULE ORAL 2 TIMES DAILY
Qty: 180 CAPSULE | Refills: 3 | Status: SHIPPED | OUTPATIENT
Start: 2024-03-05

## 2024-03-05 NOTE — TELEPHONE ENCOUNTER
No care due was identified.  [facility] Embedded Care Due Messages. Reference number: [id number].   3/05/2024 4:51:13 PM CST

## 2024-03-08 ENCOUNTER — TELEPHONE (OUTPATIENT)
Dept: INTERNAL MEDICINE | Facility: CLINIC | Age: 79
End: 2024-03-08
Payer: MEDICARE

## 2024-03-11 ENCOUNTER — PATIENT MESSAGE (OUTPATIENT)
Dept: INTERNAL MEDICINE | Facility: CLINIC | Age: 79
End: 2024-03-11
Payer: MEDICARE

## 2024-04-03 ENCOUNTER — OFFICE VISIT (OUTPATIENT)
Dept: INTERNAL MEDICINE | Facility: CLINIC | Age: 79
End: 2024-04-03
Payer: MEDICARE

## 2024-04-03 ENCOUNTER — OFFICE VISIT (OUTPATIENT)
Dept: OPHTHALMOLOGY | Facility: CLINIC | Age: 79
End: 2024-04-03
Payer: MEDICARE

## 2024-04-03 ENCOUNTER — TELEPHONE (OUTPATIENT)
Dept: RHEUMATOLOGY | Facility: CLINIC | Age: 79
End: 2024-04-03
Payer: MEDICARE

## 2024-04-03 ENCOUNTER — LAB VISIT (OUTPATIENT)
Dept: LAB | Facility: HOSPITAL | Age: 79
End: 2024-04-03
Attending: INTERNAL MEDICINE
Payer: MEDICARE

## 2024-04-03 VITALS
BODY MASS INDEX: 21.38 KG/M2 | DIASTOLIC BLOOD PRESSURE: 70 MMHG | SYSTOLIC BLOOD PRESSURE: 126 MMHG | TEMPERATURE: 99 F | OXYGEN SATURATION: 99 % | HEART RATE: 98 BPM | HEIGHT: 62 IN | WEIGHT: 116.19 LBS

## 2024-04-03 DIAGNOSIS — E11.9 DIABETES MELLITUS WITHOUT COMPLICATION: ICD-10-CM

## 2024-04-03 DIAGNOSIS — E11.59 HYPERTENSION ASSOCIATED WITH DIABETES: ICD-10-CM

## 2024-04-03 DIAGNOSIS — E11.69 HYPERLIPIDEMIA ASSOCIATED WITH TYPE 2 DIABETES MELLITUS: ICD-10-CM

## 2024-04-03 DIAGNOSIS — Z79.4 TYPE 2 DIABETES MELLITUS WITH HYPERGLYCEMIA, WITH LONG-TERM CURRENT USE OF INSULIN: ICD-10-CM

## 2024-04-03 DIAGNOSIS — H43.813 POSTERIOR VITREOUS DETACHMENT OF BOTH EYES: ICD-10-CM

## 2024-04-03 DIAGNOSIS — Z96.1 PSEUDOPHAKIA, BOTH EYES: ICD-10-CM

## 2024-04-03 DIAGNOSIS — E78.5 HYPERLIPIDEMIA ASSOCIATED WITH TYPE 2 DIABETES MELLITUS: ICD-10-CM

## 2024-04-03 DIAGNOSIS — H52.4 MYOPIA OF BOTH EYES WITH ASTIGMATISM AND PRESBYOPIA: ICD-10-CM

## 2024-04-03 DIAGNOSIS — I15.2 HYPERTENSION ASSOCIATED WITH DIABETES: ICD-10-CM

## 2024-04-03 DIAGNOSIS — H40.023 OAG (OPEN ANGLE GLAUCOMA) SUSPECT, HIGH RISK, BILATERAL: Primary | ICD-10-CM

## 2024-04-03 DIAGNOSIS — E11.65 TYPE 2 DIABETES MELLITUS WITH HYPERGLYCEMIA, WITH LONG-TERM CURRENT USE OF INSULIN: ICD-10-CM

## 2024-04-03 DIAGNOSIS — T50.905A WEIGHT LOSS DUE TO MEDICATION: ICD-10-CM

## 2024-04-03 DIAGNOSIS — R63.4 WEIGHT LOSS DUE TO MEDICATION: ICD-10-CM

## 2024-04-03 DIAGNOSIS — H52.13 MYOPIA OF BOTH EYES WITH ASTIGMATISM AND PRESBYOPIA: ICD-10-CM

## 2024-04-03 DIAGNOSIS — H52.203 MYOPIA OF BOTH EYES WITH ASTIGMATISM AND PRESBYOPIA: ICD-10-CM

## 2024-04-03 DIAGNOSIS — E11.65 TYPE 2 DIABETES MELLITUS WITH HYPERGLYCEMIA, WITH LONG-TERM CURRENT USE OF INSULIN: Primary | ICD-10-CM

## 2024-04-03 DIAGNOSIS — Z79.4 TYPE 2 DIABETES MELLITUS WITH HYPERGLYCEMIA, WITH LONG-TERM CURRENT USE OF INSULIN: Primary | ICD-10-CM

## 2024-04-03 LAB
ESTIMATED AVG GLUCOSE: 126 MG/DL (ref 68–131)
HBA1C MFR BLD: 6 % (ref 4–5.6)

## 2024-04-03 PROCEDURE — 3074F SYST BP LT 130 MM HG: CPT | Mod: CPTII,S$GLB,, | Performed by: INTERNAL MEDICINE

## 2024-04-03 PROCEDURE — 1101F PT FALLS ASSESS-DOCD LE1/YR: CPT | Mod: CPTII,S$GLB,, | Performed by: INTERNAL MEDICINE

## 2024-04-03 PROCEDURE — 1160F RVW MEDS BY RX/DR IN RCRD: CPT | Mod: CPTII,S$GLB,, | Performed by: INTERNAL MEDICINE

## 2024-04-03 PROCEDURE — 92133 CPTRZD OPH DX IMG PST SGM ON: CPT | Mod: S$GLB,,, | Performed by: OPTOMETRIST

## 2024-04-03 PROCEDURE — 1159F MED LIST DOCD IN RCRD: CPT | Mod: CPTII,S$GLB,, | Performed by: OPTOMETRIST

## 2024-04-03 PROCEDURE — 99214 OFFICE O/P EST MOD 30 MIN: CPT | Mod: S$GLB,,, | Performed by: INTERNAL MEDICINE

## 2024-04-03 PROCEDURE — 36415 COLL VENOUS BLD VENIPUNCTURE: CPT | Mod: PO | Performed by: INTERNAL MEDICINE

## 2024-04-03 PROCEDURE — G2211 COMPLEX E/M VISIT ADD ON: HCPCS | Mod: S$GLB,,, | Performed by: INTERNAL MEDICINE

## 2024-04-03 PROCEDURE — 92014 COMPRE OPH EXAM EST PT 1/>: CPT | Mod: S$GLB,,, | Performed by: OPTOMETRIST

## 2024-04-03 PROCEDURE — 3078F DIAST BP <80 MM HG: CPT | Mod: CPTII,S$GLB,, | Performed by: INTERNAL MEDICINE

## 2024-04-03 PROCEDURE — 99999 PR PBB SHADOW E&M-EST. PATIENT-LVL III: CPT | Mod: PBBFAC,,, | Performed by: OPTOMETRIST

## 2024-04-03 PROCEDURE — 99999 PR PBB SHADOW E&M-EST. PATIENT-LVL V: CPT | Mod: PBBFAC,,, | Performed by: INTERNAL MEDICINE

## 2024-04-03 PROCEDURE — 1159F MED LIST DOCD IN RCRD: CPT | Mod: CPTII,S$GLB,, | Performed by: INTERNAL MEDICINE

## 2024-04-03 PROCEDURE — 83036 HEMOGLOBIN GLYCOSYLATED A1C: CPT | Performed by: INTERNAL MEDICINE

## 2024-04-03 PROCEDURE — 1125F AMNT PAIN NOTED PAIN PRSNT: CPT | Mod: CPTII,S$GLB,, | Performed by: INTERNAL MEDICINE

## 2024-04-03 PROCEDURE — 3288F FALL RISK ASSESSMENT DOCD: CPT | Mod: CPTII,S$GLB,, | Performed by: INTERNAL MEDICINE

## 2024-04-03 RX ORDER — LATANOPROST 50 UG/ML
1 SOLUTION/ DROPS OPHTHALMIC NIGHTLY
Qty: 5 ML | Refills: 7 | Status: SHIPPED | OUTPATIENT
Start: 2024-04-03 | End: 2024-06-19 | Stop reason: SDUPTHER

## 2024-04-03 NOTE — PATIENT INSTRUCTIONS
Patient Education       Guide to Eating When You Have Diabetes   About this topic   This guide will help you control your blood sugar along with exercise and the drugs you are taking. You want to have a balanced amount of carbohydrates, fats, and protein in your meal. Following these diet guidelines may also help control your blood pressure and cholesterol.     What will the results be?   When you follow these diet guidelines, your blood sugar may be easier to keep within the goal blood sugar ranges. Ask your doctor for your goal blood sugar ranges.  What changes to diet are needed?   You need to balance how much carbohydrate, fat, and protein is in your meals. You also need to control the amount or portion size of the food you eat. Eat meals at about the same time every day. Do not skip a meal. Talk to your dietitian about making a personal meal plan for you.     Who should use this diet?   This diet is helpful to people with high blood sugar or diabetes.   What foods are good to eat?   Whole grains like:  1/3 cup (80 grams) brown rice  1/3 cup (80 grams) wild rice  1/3 cup (80 grams) whole wheat pasta  1 slice whole wheat or whole grain bread  3/4 cup (180 grams) high-fiber cereal  1/2 cup (120 grams) cooked oatmeal  1/2 (120 grams) English muffin  Fruits and vegetables like:  1/2 cup (120 grams) sweet potatoes  1/2 cup (120 grams) cooked vegetables, like squash, green beans, cauliflower, carrots, and cabbage  1 cup (240 grams) raw vegetables or salad greens  1 small apples or oranges  1/2 cup (120 grams) unsweetened fruit juice  Proteins like:  1 ounce (30 grams) lean beef or pork  1 ounce (30 grams) chicken, skin removed  1 ounce (30 grams) turkey, skin removed  1 ounce (30 grams) fish  1 ounce (30 grams) low-fat cheese or lunch meat  1/2 cup (120 grams) cooked beans ? black, kidney, chickpeas, or lentils  1 whole egg  What foods should be limited or avoided?   High fat or processed foods  like:  Ny  Sausage  Hot dogs  Processed snacks  Fats and oils like:  Margarine  Salad dressings  Foods that are high in salt like:  Table salt  Salted breads, rolls, crackers  Commercially-prepared potatoes and vegetable mixes  Canned vegetables and juices  Canned soups  Smoked, cured, or salted meats  Starches that are not whole grain like:  White rice  White potatoes  French fries  Pasta  White bread  Sugary cereals  Instant oatmeal  Baked goods, pastries  Croissants  What can be done to prevent this health problem?   Type 1 diabetes is a lifelong problem and you cannot prevent it. Type 2 diabetes can be prevented or delayed with lifestyle changes. You can still lead a normal life. Diabetes can be managed through diet, exercise, and drugs. Family members and friends can help you practice good health behaviors.  When do I need to call the doctor?   Blood sugar level is above 240 for more than a day  Blood sugar level drops to less than 40  Abnormal urine test results  Trouble breathing  Very sleepy  Throw up more than once  Many loose stools  Questions about your diet plan  Helpful tips   Try to eat smaller portions of a healthy balanced diet throughout the day. Take time to plan your meals and snacks.  Where can I learn more?   American Diabetes Association  https://www.cdc.gov/diabetes/managing/eat-well/meal-plan-method.html   HelpGuide.org  http://www.helpguide.org/home-pages/healthy-eating.htm   HelpGuide.org  http://www.helpguide.org/articles/diet-weight-loss/diabetes-diet-and-food-tips.htm   Last Reviewed Date   2021-07-21  Consumer Information Use and Disclaimer   This information is not specific medical advice and does not replace information you receive from your health care provider. This is only a brief summary of general information. It does NOT include all information about conditions, illnesses, injuries, tests, procedures, treatments, therapies, discharge instructions or life-style choices that  may apply to you. You must talk with your health care provider for complete information about your health and treatment options. This information should not be used to decide whether or not to accept your health care providers advice, instructions or recommendations. Only your health care provider has the knowledge and training to provide advice that is right for you.   Copyright   Copyright © 2021 [employer] and its affiliates and/or licensors. All rights reserved.

## 2024-04-03 NOTE — TELEPHONE ENCOUNTER
----- Message from Norma Pendleton sent at 4/3/2024 11:31 AM CDT -----  Regarding: Infusion Appt  Contact: pt daughter - Dorcas  Patient needs to reschedule missed infusion.    Please follow up at [phone].

## 2024-04-03 NOTE — ASSESSMENT & PLAN NOTE
Diabetes continues to do well at this time.  Most recent a1c is     Lab Results   Component Value Date    HGBA1C 6.3 (H) 12/01/2023    .      We will continue the current regimen.  Focus on a low carbohydrate diet and consistent exercise.

## 2024-04-03 NOTE — PROGRESS NOTES
HPI     Diabetic Eye Exam            Comments: Lab Results       Component                Value               Date                       HGBA1C                   6.3 (H)             12/01/2023                      Comments    1. COAG  31/29 Mild  2. DM no retinopathy  3. PCIOL       Vision changes since last eye exam?: Yes. Pt states that she has noticed   the vision in her left eye has been becoming more blurry and weak compared   to her right eye per pt. Pt has been using her eyeglasses PRN for reading.   Pt has been compliant with Latanoprost drops.     Any eye pain today: No.    Other ocular symptoms: Pt has been experiencing floaters OS.     Interested in contact lens fitting today? No.             Last edited by Rex Boston, OD on 4/3/2024  9:12 AM.            Assessment /Plan     For exam results, see Encounter Report.    OAG (open angle glaucoma) suspect, high risk, bilateral  -     latanoprost 0.005 % ophthalmic solution; Place 1 drop into both eyes every evening.  Dispense: 5 mL; Refill: 7  -     OCT, Optic Nerve - OU - Both Eyes  Patient states she is no longer able to tolerate HVF testing. gOCT appears stable at this time.   The patient has the following Glaucoma risk factors: Corneal Thickness, Optic Nerve Asymmetry , and Family History    Recommend close observation with Latanoprost qpm OU at this time with annual gOCT for changes advised patient if change noted on gOCT will restart HVF testing.     Diabetes mellitus without complication  4 years, last A1c 6.3 There was no diabetic retinopathy present on either eye on examination today. Recommend good blood pressure control, strict blood glucose control, and good cholesterol control.  Continue close care with Dr. Tran regarding diabetes.    Posterior vitreous detachment of both eyes  Retina flat, RD precautions reviewed. Observe.     Pseudophakia, both eyes  Doing well OU, observe.    Myopia of both eyes with astigmatism and presbyopia   Eyeglass  Final Rx       Eyeglass Final Rx         Sphere Cylinder Axis Add    Right -0.75 +0.75 159 +2.50    Left -1.75 +1.25 085 +2.50      Type: PAL    Expiration Date: 4/3/2025   PD 63                   RTC 1 yr for dilated eye exam with gOCT or sooner if any changes to vision.   Discussed above and answered questions.

## 2024-04-03 NOTE — PROGRESS NOTES
"Subjective:       Patient ID: Brianne Martin is a 79 y.o. female.    Chief Complaint: Follow-up      HPI:  Patient is a 79-year-old female presenting today for updated follow-up on type 2 diabetes, hypertension, hyperlipidemia and she has been having some issues with weight loss.  Her diabetes has been doing well.  Her last A1c was excellent.  She will get an updated want today.  She has been tolerating the wedding medications well.  Blood pressure and cholesterol have also been stable over time.  There has been no recent issues there.  She is also tolerating those meds well.      She is lost about 16 or 17 lb since last August.  This is predominantly related to most likely the Trulicity.  She indicates she is does not have much appetite.  We talked about this today and we are going to leave things alone for the short term but if we continue to see weight loss we will need to decrease the dose of the Trulicity or take her off of it.    Review of Systems   Constitutional:  Negative for chills and fever.   Respiratory:  Negative for cough, shortness of breath and wheezing.    Cardiovascular:  Negative for chest pain and palpitations.   Gastrointestinal:  Negative for blood in stool, constipation, nausea and vomiting.   Genitourinary:  Negative for dysuria and hematuria.   Musculoskeletal:  Positive for arthralgias.   Skin:  Negative for rash.       Objective:   /70   Pulse 98   Temp 98.5 °F (36.9 °C) (Tympanic)   Ht 5' 2" (1.575 m)   Wt 52.7 kg (116 lb 2.9 oz)   SpO2 99%   BMI 21.25 kg/m²      Physical Exam  Vitals reviewed.   Constitutional:       General: She is not in acute distress.     Appearance: Normal appearance. She is well-developed. She is not ill-appearing.   HENT:      Head: Normocephalic and atraumatic.      Right Ear: External ear normal.      Left Ear: External ear normal.   Cardiovascular:      Rate and Rhythm: Normal rate and regular rhythm.      Heart sounds: No murmur heard.     No " friction rub. No gallop.   Pulmonary:      Effort: Pulmonary effort is normal.      Breath sounds: Normal breath sounds. No wheezing or rales.   Chest:      Chest wall: No tenderness.   Abdominal:      General: Abdomen is flat. Bowel sounds are normal. There is no distension.      Palpations: Abdomen is soft.      Tenderness: There is no abdominal tenderness.   Lymphadenopathy:      Cervical: No cervical adenopathy.   Skin:     Findings: No rash.   Neurological:      General: No focal deficit present.      Mental Status: She is alert and oriented to person, place, and time.         No visits with results within 2 Week(s) from this visit.   Latest known visit with results is:   Lab Visit on 12/01/2023   Component Date Value    Microalbumin, Urine 12/01/2023 142.0     Creatinine, Urine 12/01/2023 104.0     Microalb/Creat Ratio 12/01/2023 136.5 (H)        Assessment:       1. Type 2 diabetes mellitus with hyperglycemia, with long-term current use of insulin    2. Hypertension associated with diabetes    3. Hyperlipidemia associated with type 2 diabetes mellitus    4. Weight loss due to medication        Plan:   Type 2 diabetes mellitus with hyperglycemia, with long-term current use of insulin  Diabetes continues to do well at this time.  Most recent a1c is     Lab Results   Component Value Date    HGBA1C 6.3 (H) 12/01/2023    .      We will continue the current regimen.  Focus on a low carbohydrate diet and consistent exercise.        Hypertension associated with diabetes  Blood pressure is under good control.  We will continue the current regimen.  Will work on regular aerobic exercise and a low salt diet.        Hyperlipidemia associated with type 2 diabetes mellitus  Cholesterol numbers look good.  We will continue the current regimen at this time.  Remain focused on low fat diet and high dietary fiber intake.    Brianne was seen today for follow-up.    Diagnoses and all orders for this visit:    Type 2 diabetes  mellitus with hyperglycemia, with long-term current use of insulin  -     Hemoglobin A1C; Future  -     CBC Auto Differential; Future  -     Comprehensive Metabolic Panel; Future  -     Lipid Panel; Future  -     Hemoglobin A1C; Future  -     Microalbumin/Creatinine Ratio, Urine; Future    Hypertension associated with diabetes    Hyperlipidemia associated with type 2 diabetes mellitus    Weight loss due to medication  Comments:  Continue current dose of trulicity.  If weight loss continues will need to decrease dose.      She expressed some ongoing issues with her arthritic pain but she has been off of her gabapentin for a period of time.  She just got back on it so will see how the gabapentin works before making any other changes    Visit today included increased complexity associated with the care of the episodic problem dm addressed and managing the longitudinal care of the patient due to the serious and/or complex managed problem(s) htn, hlp, arthritis.     Follow up in about 3 months (around 7/9/2024).

## 2024-04-23 ENCOUNTER — PATIENT MESSAGE (OUTPATIENT)
Dept: INTERNAL MEDICINE | Facility: CLINIC | Age: 79
End: 2024-04-23
Payer: MEDICARE

## 2024-04-23 DIAGNOSIS — E11.59 HYPERTENSION ASSOCIATED WITH DIABETES: ICD-10-CM

## 2024-04-23 DIAGNOSIS — I15.2 HYPERTENSION ASSOCIATED WITH DIABETES: ICD-10-CM

## 2024-04-23 DIAGNOSIS — E11.9 TYPE 2 DIABETES MELLITUS WITHOUT COMPLICATION, WITHOUT LONG-TERM CURRENT USE OF INSULIN: ICD-10-CM

## 2024-04-23 DIAGNOSIS — K21.9 GASTROESOPHAGEAL REFLUX DISEASE, UNSPECIFIED WHETHER ESOPHAGITIS PRESENT: ICD-10-CM

## 2024-04-29 ENCOUNTER — PATIENT MESSAGE (OUTPATIENT)
Dept: RHEUMATOLOGY | Facility: CLINIC | Age: 79
End: 2024-04-29
Payer: MEDICARE

## 2024-04-29 ENCOUNTER — TELEPHONE (OUTPATIENT)
Dept: INTERNAL MEDICINE | Facility: CLINIC | Age: 79
End: 2024-04-29
Payer: MEDICARE

## 2024-04-29 RX ORDER — AMLODIPINE BESYLATE 10 MG/1
10 TABLET ORAL EVERY MORNING
Qty: 90 TABLET | Refills: 2 | Status: SHIPPED | OUTPATIENT
Start: 2024-04-29 | End: 2025-04-29

## 2024-04-29 RX ORDER — METFORMIN HYDROCHLORIDE 1000 MG/1
1000 TABLET ORAL 2 TIMES DAILY WITH MEALS
Qty: 180 TABLET | Refills: 0 | Status: SHIPPED | OUTPATIENT
Start: 2024-04-29 | End: 2024-06-19 | Stop reason: SDUPTHER

## 2024-04-29 RX ORDER — OMEPRAZOLE 40 MG/1
40 CAPSULE, DELAYED RELEASE ORAL EVERY MORNING
Qty: 90 CAPSULE | Refills: 2 | Status: SHIPPED | OUTPATIENT
Start: 2024-04-29

## 2024-04-29 RX ORDER — SIMVASTATIN 40 MG/1
40 TABLET, FILM COATED ORAL DAILY
Qty: 90 TABLET | Refills: 0 | Status: SHIPPED | OUTPATIENT
Start: 2024-04-29 | End: 2024-06-19 | Stop reason: SDUPTHER

## 2024-04-29 RX ORDER — DULAGLUTIDE 3 MG/.5ML
3 INJECTION, SOLUTION SUBCUTANEOUS
Qty: 12 PEN | Refills: 3 | Status: SHIPPED | OUTPATIENT
Start: 2024-04-29

## 2024-04-29 NOTE — TELEPHONE ENCOUNTER
----- Message from Chelsie May sent at 4/29/2024  1:58 PM CDT -----  Contact: self  Type:  Patient Returning Call    Who Called:Brianne Martin  Who Left Message for Patient:Ck  Does the patient know what this is regarding?:medication  Would the patient rather a call back or a response via [facility]? Call back  Best Call Back Number:[phone] ([place])   Additional Information: n/a

## 2024-04-29 NOTE — TELEPHONE ENCOUNTER
No care due was identified.  [facility] Embedded Care Due Messages. Reference number: [id number].   4/29/2024 2:46:12 PM CDT

## 2024-05-02 ENCOUNTER — TELEPHONE (OUTPATIENT)
Dept: INTERNAL MEDICINE | Facility: CLINIC | Age: 79
End: 2024-05-02
Payer: MEDICARE

## 2024-06-10 ENCOUNTER — OFFICE VISIT (OUTPATIENT)
Dept: RHEUMATOLOGY | Facility: CLINIC | Age: 79
End: 2024-06-10
Payer: MEDICARE

## 2024-06-10 ENCOUNTER — INFUSION (OUTPATIENT)
Dept: INFUSION THERAPY | Facility: HOSPITAL | Age: 79
End: 2024-06-10
Attending: INTERNAL MEDICINE
Payer: MEDICARE

## 2024-06-10 VITALS
WEIGHT: 117.5 LBS | BODY MASS INDEX: 21.62 KG/M2 | SYSTOLIC BLOOD PRESSURE: 155 MMHG | DIASTOLIC BLOOD PRESSURE: 80 MMHG | HEART RATE: 82 BPM | HEIGHT: 62 IN

## 2024-06-10 DIAGNOSIS — G89.29 CHRONIC PAIN OF BOTH KNEES: ICD-10-CM

## 2024-06-10 DIAGNOSIS — M81.0 AGE-RELATED OSTEOPOROSIS WITHOUT CURRENT PATHOLOGICAL FRACTURE: Primary | ICD-10-CM

## 2024-06-10 DIAGNOSIS — M25.562 CHRONIC PAIN OF BOTH KNEES: ICD-10-CM

## 2024-06-10 DIAGNOSIS — M25.561 CHRONIC PAIN OF BOTH KNEES: ICD-10-CM

## 2024-06-10 DIAGNOSIS — M81.0 OSTEOPOROSIS, POSTMENOPAUSAL: Primary | ICD-10-CM

## 2024-06-10 DIAGNOSIS — M62.81 MUSCLE WEAKNESS: ICD-10-CM

## 2024-06-10 PROCEDURE — 63600175 PHARM REV CODE 636 W HCPCS: Mod: JZ,JG | Performed by: PHYSICIAN ASSISTANT

## 2024-06-10 PROCEDURE — 99215 OFFICE O/P EST HI 40 MIN: CPT | Mod: S$GLB,,, | Performed by: STUDENT IN AN ORGANIZED HEALTH CARE EDUCATION/TRAINING PROGRAM

## 2024-06-10 PROCEDURE — 1160F RVW MEDS BY RX/DR IN RCRD: CPT | Mod: CPTII,S$GLB,, | Performed by: STUDENT IN AN ORGANIZED HEALTH CARE EDUCATION/TRAINING PROGRAM

## 2024-06-10 PROCEDURE — 1125F AMNT PAIN NOTED PAIN PRSNT: CPT | Mod: CPTII,S$GLB,, | Performed by: STUDENT IN AN ORGANIZED HEALTH CARE EDUCATION/TRAINING PROGRAM

## 2024-06-10 PROCEDURE — 1101F PT FALLS ASSESS-DOCD LE1/YR: CPT | Mod: CPTII,S$GLB,, | Performed by: STUDENT IN AN ORGANIZED HEALTH CARE EDUCATION/TRAINING PROGRAM

## 2024-06-10 PROCEDURE — 3288F FALL RISK ASSESSMENT DOCD: CPT | Mod: CPTII,S$GLB,, | Performed by: STUDENT IN AN ORGANIZED HEALTH CARE EDUCATION/TRAINING PROGRAM

## 2024-06-10 PROCEDURE — 96372 THER/PROPH/DIAG INJ SC/IM: CPT

## 2024-06-10 PROCEDURE — 3077F SYST BP >= 140 MM HG: CPT | Mod: CPTII,S$GLB,, | Performed by: STUDENT IN AN ORGANIZED HEALTH CARE EDUCATION/TRAINING PROGRAM

## 2024-06-10 PROCEDURE — 1159F MED LIST DOCD IN RCRD: CPT | Mod: CPTII,S$GLB,, | Performed by: STUDENT IN AN ORGANIZED HEALTH CARE EDUCATION/TRAINING PROGRAM

## 2024-06-10 PROCEDURE — 3079F DIAST BP 80-89 MM HG: CPT | Mod: CPTII,S$GLB,, | Performed by: STUDENT IN AN ORGANIZED HEALTH CARE EDUCATION/TRAINING PROGRAM

## 2024-06-10 PROCEDURE — 99999 PR PBB SHADOW E&M-EST. PATIENT-LVL V: CPT | Mod: PBBFAC,,, | Performed by: STUDENT IN AN ORGANIZED HEALTH CARE EDUCATION/TRAINING PROGRAM

## 2024-06-10 RX ORDER — DEXTROMETHORPHAN HYDROBROMIDE, GUAIFENESIN 5; 100 MG/5ML; MG/5ML
650 LIQUID ORAL EVERY 8 HOURS
Qty: 90 TABLET | Refills: 3 | Status: SHIPPED | OUTPATIENT
Start: 2024-06-10

## 2024-06-10 RX ADMIN — DENOSUMAB 60 MG: 60 INJECTION SUBCUTANEOUS at 02:06

## 2024-06-10 NOTE — PATIENT INSTRUCTIONS
Osteoporosis  Prolia today  Lab, visit, Prolia in 6 months.  Take vitamin D 1000 units daily.  Get calcium 1200 mg from your diet daily.  Osteoporosis precautions:  Regular exercise: aerobic, strength, flexibility, and balance.  Limitation of alcohol consumption to </= 2 drinks per day.  Limitation of caffeine consumption to </= 2 servings per day.  Fall prevention, avoid high impact/twisting motion, do not flop into a chair.      Muscle weakness  Contact me if your daughter can drive you to Physical Therapy and I will order it.        Knee pain  Take Arthritis strength extended release Tylenol 650 mg 1 tablet 3 times daily as needed for pain.  Start turmeric supplements 1000 mg 2 times daily for anti-inflammatory benefit.  Start osteo Bi-Flex triple strength 1 capsule 2 times daily for joint protection.  Try high fiber whole food plant based diet with fewer animal products and sugars.

## 2024-06-10 NOTE — PROGRESS NOTES
"Subjective:      Patient ID: Brianne Martin is a 79 y.o. female.    Chief Complaint: Osteoporosis    HPI:   Patient with anxiety, depression, pseudophakia of both eyes, DM2, HTN, HLD, GERD, osteoporosis presents for Rheumatology follow up for osteoporosis. She's on Prolia which she tolerates well. She fell last week because she has weakness lifting the right leg while walking. Since an ankle injury last year, she has had weakness in the thighs and legs. Walks with a cane. She did home health last year and they taught her some exercises which she does at home. Does not have transportation to get to formal PT.     Saw the dentist last week for a cleaning. No planned dental work.     Hx of fractures: Broke ankle when she stepped on a toy a couple years ago, now with hardware in place.  Previous bone strengthening agents: Has used bisphosphonates in the past. Now on Prolia.  Taking Vitamin D supplements: Taking a liquid drop supplement. Thinks she takes 1000 units daily as well as a women over 60 multivitamin.  Invasive dental work: None done and none planned  Acid reflux: Yes, controlled with Prilosec.  History of skeletal metastases or radiation to the skeleton: No  History of kidney stones: No    Social Hx: Never smoker. No alcohol use. Drinks 1 cup of coffee in the morning and 2 cups of tea daily.      Objective:   BP (!) 155/80   Pulse 82   Ht 5' 2" (1.575 m)   Wt 53.3 kg (117 lb 8.1 oz)   BMI 21.49 kg/m²   Physical Exam  Constitutional:       General: She is not in acute distress.     Appearance: Normal appearance.   HENT:      Head: Normocephalic and atraumatic.      Mouth/Throat:      Mouth: Mucous membranes are moist.      Pharynx: Oropharynx is clear.   Cardiovascular:      Rate and Rhythm: Normal rate and regular rhythm.      Heart sounds: Normal heart sounds.   Pulmonary:      Effort: Pulmonary effort is normal.      Breath sounds: Normal breath sounds.   Abdominal:      Palpations: Abdomen is soft.      " Tenderness: There is no abdominal tenderness.   Musculoskeletal:         General: Tenderness (bilateral knees) present. No swelling. Normal range of motion.      Cervical back: Normal range of motion.      Comments:   Strength exam:  Proximal lower extremities: 3/5  Distal lower extremities: 2/5   Neurological:      Mental Status: She is alert.             Assessment and Plan:     Problem List Items Addressed This Visit    None  Visit Diagnoses       Age-related osteoporosis without current pathological fracture    -  Primary    Relevant Orders    Comprehensive Metabolic Panel    Muscle weakness        Relevant Orders    CK    Aldolase    C-Reactive Protein    Comprehensive Metabolic Panel    Sedimentation rate    Chronic pain of both knees                Patient with anxiety, depression, pseudophakia of both eyes, DM2, HTN, HLD, GERD, osteoporosis presents for Rheumatology follow up for osteoporosis.     Osteoporosis  Labs show normal calcium and GFR today. Okay to proceed with Prolia.  Prolia today  Lab, visit, Prolia in 6 months.  DXA due 8/18/2025  Take vitamin D 1000 units daily.  Get calcium 1200 mg from your diet daily.  Osteoporosis precautions:  Regular exercise: aerobic, strength, flexibility, and balance.  Limitation of alcohol consumption to </= 2 drinks per day.  Limitation of caffeine consumption to </= 2 servings per day.  Fall prevention, avoid high impact/twisting motion, do not flop into a chair.        Muscle weakness  Suspect from lack of nutrition and lack of exercise.  Check CPK, aldolase, ESR, CRP with next labs.  Encouraged her to do PT but she can not get transportation. She will contact me if her daughter can drive her and she can start PT.        Knee pain  Reviewed knee XR from 8/2023. Mild OA alessandro right knee.  Take Arthritis strength extended release Tylenol 650 mg 1 tablet 3 times daily as needed for pain.  Start turmeric supplements 1000 mg 2 times daily for anti-inflammatory  benefit.  Start osteo Bi-Flex triple strength 1 capsule 2 times daily for joint protection.  Try high fiber whole food plant based diet with fewer animal products and sugars.                Follow up in about 6 months (around 12/10/2024).       I spent a total of 40 minutes on the day of the visit.  This includes face to face time and non-face to face time preparing to see the patient (eg, review of tests), obtaining and/or reviewing separately obtained history, documenting clinical information in the electronic or other health record, independently interpreting results and communicating results to the patient/family/caregiver, or care coordinator.

## 2024-06-10 NOTE — DISCHARGE INSTRUCTIONS
Allen Nguyen is requesting a refill of:    Pending Prescriptions:                       Disp   Refills    methylphenidate (CONCERTA) 36 MG CR [id number] tab*0            Sig: Take 1 tablet (36 mg) by mouth daily    methylphenidate (CONCERTA) 36 MG CR [id number] tab*0            Sig: Take 1 tablet (36 mg) by mouth daily    methylphenidate (CONCERTA) 36 MG CR [id number] tab*0            Sig: Take 1 tablet (36 mg) by mouth daily    Please close encounter if RX was sent. Thanks, Bernice     FALL PREVENTION   Falls often occur due to slipping, tripping or losing your balance. Here are ways to reduce your risk of falling again.   Was there anything that caused your fall that can be fixed, removed or replaced?   Make your home safe by keeping walkways clear of objects you may trip over.   Use non-slip pads under rugs.   Do not walk in poorly lit areas.   Do not stand on chairs or wobbly ladders.   Use caution when reaching overhead or looking upward. This position can cause a loss of balance.   Be sure your shoes fit properly, have non-slip bottoms and are in good condition.   Be cautious when going up and down stairs, curbs, and when walking on uneven sidewalks.   If your balance is poor, consider using a cane or walker.   If your fall was related to alcohol use, stop or limit alcohol intake.   If your fall was related to use of sleeping medicines, talk to your doctor about this. You may need to reduce your dosage at bedtime if you awaken during the night to go to the bathroom.   To reduce the need for nighttime bathroom trips:   Avoid drinking fluids for several hours before going to bed   Empty your bladder before going to bed   Men can keep a urinal at the bedside   © 4834-9054 [employer] [facility], [address] [place] [address], [place] [address]. All rights reserved. This information is not intended as a substitute for professional medical care. Always follow your healthcare professional's instructions.

## 2024-06-10 NOTE — NURSING
Prolia 60 mg administered sc as ordered. Pt tolerated well with no adverse events. Scheduled to return in 6 months.

## 2024-06-19 DIAGNOSIS — E11.9 TYPE 2 DIABETES MELLITUS WITHOUT COMPLICATION, WITHOUT LONG-TERM CURRENT USE OF INSULIN: ICD-10-CM

## 2024-06-19 DIAGNOSIS — E11.59 HYPERTENSION ASSOCIATED WITH DIABETES: ICD-10-CM

## 2024-06-19 DIAGNOSIS — E78.5 HYPERLIPIDEMIA ASSOCIATED WITH TYPE 2 DIABETES MELLITUS: ICD-10-CM

## 2024-06-19 DIAGNOSIS — H40.023 OAG (OPEN ANGLE GLAUCOMA) SUSPECT, HIGH RISK, BILATERAL: ICD-10-CM

## 2024-06-19 DIAGNOSIS — E78.5 HYPERLIPIDEMIA ASSOCIATED WITH TYPE 2 DIABETES MELLITUS: Primary | ICD-10-CM

## 2024-06-19 DIAGNOSIS — G51.0 BELL'S PALSY: ICD-10-CM

## 2024-06-19 DIAGNOSIS — E11.69 HYPERLIPIDEMIA ASSOCIATED WITH TYPE 2 DIABETES MELLITUS: Primary | ICD-10-CM

## 2024-06-19 DIAGNOSIS — E11.69 HYPERLIPIDEMIA ASSOCIATED WITH TYPE 2 DIABETES MELLITUS: ICD-10-CM

## 2024-06-19 DIAGNOSIS — I15.2 HYPERTENSION ASSOCIATED WITH DIABETES: ICD-10-CM

## 2024-06-19 DIAGNOSIS — E11.65 TYPE 2 DIABETES MELLITUS WITH HYPERGLYCEMIA, WITH LONG-TERM CURRENT USE OF INSULIN: ICD-10-CM

## 2024-06-19 DIAGNOSIS — Z79.4 TYPE 2 DIABETES MELLITUS WITH HYPERGLYCEMIA, WITH LONG-TERM CURRENT USE OF INSULIN: ICD-10-CM

## 2024-06-19 RX ORDER — AMOXICILLIN 500 MG
2 CAPSULE ORAL 2 TIMES DAILY
Qty: 360 CAPSULE | Refills: 1 | OUTPATIENT
Start: 2024-06-19

## 2024-06-19 RX ORDER — METFORMIN HYDROCHLORIDE 1000 MG/1
1000 TABLET ORAL 2 TIMES DAILY WITH MEALS
Qty: 180 TABLET | Refills: 1 | Status: SHIPPED | OUTPATIENT
Start: 2024-06-19

## 2024-06-19 RX ORDER — LATANOPROST 50 UG/ML
1 SOLUTION/ DROPS OPHTHALMIC NIGHTLY
Qty: 5 ML | Refills: 7 | Status: SHIPPED | OUTPATIENT
Start: 2024-06-19 | End: 2024-09-04

## 2024-06-19 RX ORDER — LANCETS
EACH MISCELLANEOUS
Qty: 300 EACH | Refills: 3 | Status: SHIPPED | OUTPATIENT
Start: 2024-06-19

## 2024-06-19 RX ORDER — DEXTROSE 4 G
TABLET,CHEWABLE ORAL
Qty: 1 EACH | Refills: 0 | Status: SHIPPED | OUTPATIENT
Start: 2024-06-19

## 2024-06-19 RX ORDER — SIMVASTATIN 40 MG/1
40 TABLET, FILM COATED ORAL DAILY
Qty: 90 TABLET | Refills: 1 | Status: SHIPPED | OUTPATIENT
Start: 2024-06-19

## 2024-06-19 NOTE — TELEPHONE ENCOUNTER
No care due was identified.  [facility] Embedded Care Due Messages. Reference number: [id number].   6/19/2024 11:56:53 AM CDT

## 2024-06-19 NOTE — TELEPHONE ENCOUNTER
No care due was identified.  [facility] Embedded Care Due Messages. Reference number: [id number].   6/19/2024 11:55:59 AM CDT

## 2024-06-19 NOTE — TELEPHONE ENCOUNTER
Refill Routing Note   Medication(s) are not appropriate for processing by [facility] for the following reason(s):        No active prescription written by provider: Flonase (Exp: 22)  Required vitals abnormal: Venlafaxine, Losartan - BP  (!) 155/80  Outside of protocol: Praluent, Refresh P.M., Gabapentin    [facility] action(s):  Defer  Route      Medication Therapy Plan: Glucose meter, lancets & test strips rx  through active course of treatment on 24; medication still an active part of patient's therapy plan      Appointments  past 12m or future 3m with PCP    Date Provider   Last Visit   4/3/2024 Hardy Tran MD   Next Visit   7/10/2024 Hardy Tran MD   ED visits in past 90 days: 0        Note composed:4:42 PM 2024

## 2024-06-19 NOTE — TELEPHONE ENCOUNTER
No care due was identified.  [facility] Embedded Care Due Messages. Reference number: [id number].   6/19/2024 11:55:27 AM CDT

## 2024-06-19 NOTE — TELEPHONE ENCOUNTER
Refill Routing Note   Medication(s) are not appropriate for processing by [facility] for the following reason(s):        New or recently adjusted medication    [facility] action(s):  Defer  Approve        Medication Therapy Plan: initiated by provider on 04/10/2024      Appointments  past 12m or future 3m with PCP    Date Provider   Last Visit   4/3/2024 Hardy Tran MD   Next Visit   6/19/2024 Hardy Tran MD   ED visits in past 90 days: 0        Note composed:2:35 PM 06/19/2024

## 2024-06-20 RX ORDER — ALIROCUMAB 150 MG/ML
150 INJECTION, SOLUTION SUBCUTANEOUS
Qty: 6 ML | Refills: 3 | Status: SHIPPED | OUTPATIENT
Start: 2024-06-20

## 2024-06-20 RX ORDER — MINERAL OIL, PETROLATUM 425; 573 MG/G; MG/G
OINTMENT OPHTHALMIC NIGHTLY
Qty: 3.5 G | Refills: 2 | Status: SHIPPED | OUTPATIENT
Start: 2024-06-20

## 2024-06-20 RX ORDER — GABAPENTIN 300 MG/1
300 CAPSULE ORAL 2 TIMES DAILY
Qty: 180 CAPSULE | Refills: 3 | Status: SHIPPED | OUTPATIENT
Start: 2024-06-20

## 2024-06-20 RX ORDER — PEN NEEDLE, DIABETIC 30 GX3/16"
1 NEEDLE, DISPOSABLE MISCELLANEOUS NIGHTLY
Qty: 100 EACH | Refills: 3 | Status: SHIPPED | OUTPATIENT
Start: 2024-06-20

## 2024-06-20 RX ORDER — FLUTICASONE PROPIONATE 50 MCG
1 SPRAY, SUSPENSION (ML) NASAL DAILY
Qty: 32 G | Refills: 3 | Status: SHIPPED | OUTPATIENT
Start: 2024-06-20

## 2024-06-20 RX ORDER — LOSARTAN POTASSIUM 100 MG/1
100 TABLET ORAL DAILY
Qty: 90 TABLET | Refills: 3 | Status: SHIPPED | OUTPATIENT
Start: 2024-06-20

## 2024-06-20 RX ORDER — VENLAFAXINE 75 MG/1
75 TABLET ORAL 2 TIMES DAILY
Qty: 180 TABLET | Refills: 3 | Status: SHIPPED | OUTPATIENT
Start: 2024-06-20

## 2024-06-24 ENCOUNTER — PATIENT MESSAGE (OUTPATIENT)
Dept: ADMINISTRATIVE | Facility: HOSPITAL | Age: 79
End: 2024-06-24
Payer: MEDICARE

## 2024-06-26 ENCOUNTER — HOSPITAL ENCOUNTER (OUTPATIENT)
Dept: RADIOLOGY | Facility: HOSPITAL | Age: 79
Discharge: HOME | End: 2024-06-26
Attending: INTERNAL MEDICINE
Payer: MEDICARE

## 2024-06-26 DIAGNOSIS — Z12.31 ENCOUNTER FOR SCREENING MAMMOGRAM FOR BREAST CANCER: ICD-10-CM

## 2024-06-26 PROCEDURE — 77067 SCR MAMMO BI INCL CAD: CPT | Mod: 26,,, | Performed by: RADIOLOGY

## 2024-06-26 PROCEDURE — 77063 BREAST TOMOSYNTHESIS BI: CPT | Mod: 26,,, | Performed by: RADIOLOGY

## 2024-06-26 PROCEDURE — 77067 SCR MAMMO BI INCL CAD: CPT | Mod: TC,PO

## 2024-06-27 NOTE — PROGRESS NOTES
Your mammogram is normal.  Repeat screening is recommended in one year.    Sincerely,    Hardy Tran M.D.        If you would like to review your experience with Dr. Tran or [facility], please follow the link below:    [email or web address]

## 2024-07-03 ENCOUNTER — LAB VISIT (OUTPATIENT)
Dept: LAB | Facility: HOSPITAL | Age: 79
End: 2024-07-03
Attending: INTERNAL MEDICINE
Payer: MEDICARE

## 2024-07-03 ENCOUNTER — TELEPHONE (OUTPATIENT)
Dept: INTERNAL MEDICINE | Facility: CLINIC | Age: 79
End: 2024-07-03
Payer: MEDICARE

## 2024-07-03 DIAGNOSIS — Z79.4 TYPE 2 DIABETES MELLITUS WITH HYPERGLYCEMIA, WITH LONG-TERM CURRENT USE OF INSULIN: ICD-10-CM

## 2024-07-03 DIAGNOSIS — E11.65 TYPE 2 DIABETES MELLITUS WITH HYPERGLYCEMIA, WITH LONG-TERM CURRENT USE OF INSULIN: ICD-10-CM

## 2024-07-03 LAB
ALBUMIN/CREAT UR: 121.4 UG/MG (ref 0–30)
CREAT UR-MCNC: 42 MG/DL (ref 15–325)
MICROALBUMIN UR DL<=1MG/L-MCNC: 51 UG/ML

## 2024-07-03 PROCEDURE — 82570 ASSAY OF URINE CREATININE: CPT | Performed by: INTERNAL MEDICINE

## 2024-07-08 DIAGNOSIS — E11.59 HYPERTENSION ASSOCIATED WITH DIABETES: ICD-10-CM

## 2024-07-08 DIAGNOSIS — I15.2 HYPERTENSION ASSOCIATED WITH DIABETES: ICD-10-CM

## 2024-07-08 DIAGNOSIS — E78.5 HYPERLIPIDEMIA ASSOCIATED WITH TYPE 2 DIABETES MELLITUS: ICD-10-CM

## 2024-07-08 DIAGNOSIS — Z79.4 TYPE 2 DIABETES MELLITUS WITH HYPERGLYCEMIA, WITH LONG-TERM CURRENT USE OF INSULIN: ICD-10-CM

## 2024-07-08 DIAGNOSIS — E11.69 HYPERLIPIDEMIA ASSOCIATED WITH TYPE 2 DIABETES MELLITUS: ICD-10-CM

## 2024-07-08 DIAGNOSIS — E11.65 TYPE 2 DIABETES MELLITUS WITH HYPERGLYCEMIA, WITH LONG-TERM CURRENT USE OF INSULIN: ICD-10-CM

## 2024-07-08 RX ORDER — DEXTROMETHORPHAN HYDROBROMIDE, GUAIFENESIN 5; 100 MG/5ML; MG/5ML
650 LIQUID ORAL EVERY 8 HOURS
Qty: 90 TABLET | Refills: 3 | Status: SHIPPED | OUTPATIENT
Start: 2024-07-08

## 2024-07-08 RX ORDER — LOSARTAN POTASSIUM 100 MG/1
100 TABLET ORAL DAILY
Qty: 90 TABLET | Refills: 3 | Status: SHIPPED | OUTPATIENT
Start: 2024-07-08

## 2024-07-08 RX ORDER — ALIROCUMAB 150 MG/ML
150 INJECTION, SOLUTION SUBCUTANEOUS
Qty: 6 ML | Refills: 3 | Status: ACTIVE | OUTPATIENT
Start: 2024-07-08

## 2024-07-08 NOTE — TELEPHONE ENCOUNTER
No care due was identified.  [facility] Embedded Care Due Messages. Reference number: [id number].   7/08/2024 10:09:58 AM CDT

## 2024-07-08 NOTE — TELEPHONE ENCOUNTER
No care due was identified.  [facility] Embedded Care Due Messages. Reference number: [id number].   7/08/2024 10:08:56 AM CDT

## 2024-07-08 NOTE — TELEPHONE ENCOUNTER
You did this on 6/20/24 and it was set to print can you please resend so it will go electronically?

## 2024-07-09 RX ORDER — DEXTROSE 4 G
TABLET,CHEWABLE ORAL
Qty: 1 EACH | Refills: 0 | Status: SHIPPED | OUTPATIENT
Start: 2024-07-09 | End: 2025-08-06

## 2024-07-09 NOTE — TELEPHONE ENCOUNTER
Refill Decision Note   Brianne Veronica  is requesting a refill authorization.  Brief Assessment and Rationale for Refill:  Approve     Medication Therapy Plan:         Comments:     Note composed:3:55 AM 07/09/2024

## 2024-07-10 ENCOUNTER — TELEPHONE (OUTPATIENT)
Dept: NEUROLOGY | Facility: CLINIC | Age: 79
End: 2024-07-10
Payer: MEDICARE

## 2024-07-10 ENCOUNTER — OFFICE VISIT (OUTPATIENT)
Dept: INTERNAL MEDICINE | Facility: CLINIC | Age: 79
End: 2024-07-10
Payer: MEDICARE

## 2024-07-10 VITALS
OXYGEN SATURATION: 99 % | HEART RATE: 99 BPM | SYSTOLIC BLOOD PRESSURE: 138 MMHG | DIASTOLIC BLOOD PRESSURE: 80 MMHG | BODY MASS INDEX: 21.94 KG/M2 | WEIGHT: 119.94 LBS | TEMPERATURE: 98 F

## 2024-07-10 DIAGNOSIS — E78.5 HYPERLIPIDEMIA ASSOCIATED WITH TYPE 2 DIABETES MELLITUS: ICD-10-CM

## 2024-07-10 DIAGNOSIS — E11.69 HYPERLIPIDEMIA ASSOCIATED WITH TYPE 2 DIABETES MELLITUS: ICD-10-CM

## 2024-07-10 DIAGNOSIS — R29.6 MULTIPLE FALLS: ICD-10-CM

## 2024-07-10 DIAGNOSIS — Z79.4 TYPE 2 DIABETES MELLITUS WITH HYPERGLYCEMIA, WITH LONG-TERM CURRENT USE OF INSULIN: Primary | ICD-10-CM

## 2024-07-10 DIAGNOSIS — I15.2 HYPERTENSION ASSOCIATED WITH DIABETES: ICD-10-CM

## 2024-07-10 DIAGNOSIS — R20.2 PARESTHESIA OF BOTH HANDS: Primary | ICD-10-CM

## 2024-07-10 DIAGNOSIS — E11.65 TYPE 2 DIABETES MELLITUS WITH HYPERGLYCEMIA, WITH LONG-TERM CURRENT USE OF INSULIN: Primary | ICD-10-CM

## 2024-07-10 DIAGNOSIS — R20.2 PARESTHESIA OF BOTH HANDS: ICD-10-CM

## 2024-07-10 DIAGNOSIS — E11.59 HYPERTENSION ASSOCIATED WITH DIABETES: ICD-10-CM

## 2024-07-10 DIAGNOSIS — E11.9 TYPE 2 DIABETES MELLITUS WITHOUT COMPLICATION, WITHOUT LONG-TERM CURRENT USE OF INSULIN: ICD-10-CM

## 2024-07-10 PROCEDURE — G2211 COMPLEX E/M VISIT ADD ON: HCPCS | Mod: S$GLB,,, | Performed by: INTERNAL MEDICINE

## 2024-07-10 PROCEDURE — 3079F DIAST BP 80-89 MM HG: CPT | Mod: CPTII,S$GLB,, | Performed by: INTERNAL MEDICINE

## 2024-07-10 PROCEDURE — 99214 OFFICE O/P EST MOD 30 MIN: CPT | Mod: S$GLB,,, | Performed by: INTERNAL MEDICINE

## 2024-07-10 PROCEDURE — 99999 PR PBB SHADOW E&M-EST. PATIENT-LVL V: CPT | Mod: PBBFAC,,, | Performed by: INTERNAL MEDICINE

## 2024-07-10 PROCEDURE — 3075F SYST BP GE 130 - 139MM HG: CPT | Mod: CPTII,S$GLB,, | Performed by: INTERNAL MEDICINE

## 2024-07-10 PROCEDURE — 1159F MED LIST DOCD IN RCRD: CPT | Mod: CPTII,S$GLB,, | Performed by: INTERNAL MEDICINE

## 2024-07-10 PROCEDURE — 1160F RVW MEDS BY RX/DR IN RCRD: CPT | Mod: CPTII,S$GLB,, | Performed by: INTERNAL MEDICINE

## 2024-07-10 PROCEDURE — 3288F FALL RISK ASSESSMENT DOCD: CPT | Mod: CPTII,S$GLB,, | Performed by: INTERNAL MEDICINE

## 2024-07-10 PROCEDURE — 1100F PTFALLS ASSESS-DOCD GE2>/YR: CPT | Mod: CPTII,S$GLB,, | Performed by: INTERNAL MEDICINE

## 2024-07-10 PROCEDURE — 1125F AMNT PAIN NOTED PAIN PRSNT: CPT | Mod: CPTII,S$GLB,, | Performed by: INTERNAL MEDICINE

## 2024-07-10 RX ORDER — ALENDRONATE SODIUM 70 MG/1
70 TABLET ORAL
COMMUNITY
Start: 2024-04-13

## 2024-07-10 RX ORDER — DULAGLUTIDE 3 MG/.5ML
3 INJECTION, SOLUTION SUBCUTANEOUS
Qty: 12 PEN | Refills: 3 | Status: SHIPPED | OUTPATIENT
Start: 2024-07-10

## 2024-07-10 NOTE — PROGRESS NOTES
Subjective:       Patient ID: Brianne Martin is a 79 y.o. female.    Chief Complaint: follow up  (Arm numbness 2 weeks, back pain )      HPI:  Patient is a 79-year-old female presenting today for follow-up on diabetes, hypertension, hyperlipidemia.  Patient has been off of some of her diabetes medications in her cholesterol medications for an unclear amount of time.  They remain in some issues with insurance coverage and then they been in some other issues but she has not been taking the Trulicity for sure and we do not believe she has been taking her Praluent as well.  This would make sense as her A1c went from 6-9 and her cholesterol numbers went up significantly as well.  We talked about the importance of taking the medications and they she and her daughter both expressed understanding and they think they have got it figured out at this point still all the issues that were having.  We we did send in a litany of refills for her yesterday.      Blood pressure remains under good control she has tolerating the medications well.  No signs or symptoms of cardiac decompensation.  Her lipids are as noted above.      She has had some issues with some falls.  She describes situations where it just feels like her legs is going to lock up and she just loses her balance.  She has not had any significant injuries associated with the falls.  She estimates that she may has a fall once or twice a week.    She also complains of some tingling in her hands.  She states this is in both hands it is pretty much consistent throughout the day.  It does not get worse at night or better at night.    Review of Systems   Constitutional:  Negative for chills and fever.   Respiratory:  Negative for cough, shortness of breath and wheezing.    Cardiovascular:  Negative for chest pain and palpitations.   Gastrointestinal:  Negative for blood in stool, constipation, nausea and vomiting.   Genitourinary:  Negative for dysuria and hematuria.   Skin:   Negative for rash.   Neurological:  Positive for numbness.       Objective:   /80   Pulse 99   Temp 97.9 °F (36.6 °C) (Tympanic)   Wt 54.4 kg (119 lb 14.9 oz)   SpO2 99%   BMI 21.94 kg/m²      Physical Exam  Vitals reviewed.   Constitutional:       General: She is not in acute distress.     Appearance: Normal appearance. She is well-developed. She is not ill-appearing.   HENT:      Head: Normocephalic and atraumatic.      Right Ear: External ear normal.      Left Ear: External ear normal.   Cardiovascular:      Rate and Rhythm: Normal rate and regular rhythm.      Heart sounds: No murmur heard.     No friction rub. No gallop.   Pulmonary:      Effort: Pulmonary effort is normal.      Breath sounds: Normal breath sounds. No wheezing or rales.   Chest:      Chest wall: No tenderness.   Abdominal:      General: Bowel sounds are normal. There is no distension.      Palpations: Abdomen is soft.      Tenderness: There is no abdominal tenderness.   Lymphadenopathy:      Cervical: No cervical adenopathy.   Skin:     Findings: No rash.   Neurological:      General: No focal deficit present.      Mental Status: She is alert and oriented to person, place, and time.      Cranial Nerves: No cranial nerve deficit.      Comments: Positive Tinel's at the right wrist.  Negative Tinel's at the left wrist.         Lab Visit on 07/03/2024   Component Date Value    WBC 07/03/2024 4.86     RBC 07/03/2024 4.04     Hemoglobin 07/03/2024 12.0     Hematocrit 07/03/2024 36.9 (L)     MCV 07/03/2024 91     MCH 07/03/2024 29.7     MCHC 07/03/2024 32.5     RDW 07/03/2024 13.0     Platelets 07/03/2024 239     MPV 07/03/2024 11.7     Immature Granulocytes 07/03/2024 0.4     Gran # (ANC) 07/03/2024 3.0     Immature Grans (Abs) 07/03/2024 0.02     Lymph # 07/03/2024 1.3     Mono # 07/03/2024 0.4     Eos # 07/03/2024 0.2     Baso # 07/03/2024 0.04     nRBC 07/03/2024 0     Gran % 07/03/2024 61.6     Lymph % 07/03/2024 26.5     Mono %  07/03/2024 7.2     Eosinophil % 07/03/2024 3.5     Basophil % 07/03/2024 0.8     Differential Method 07/03/2024 Automated     Sodium 07/03/2024 140     Potassium 07/03/2024 4.6     Chloride 07/03/2024 105     CO2 07/03/2024 28     Glucose 07/03/2024 233 (H)     BUN 07/03/2024 14     Creatinine 07/03/2024 0.9     Calcium 07/03/2024 9.7     Total Protein 07/03/2024 7.0     Albumin 07/03/2024 3.7     Total Bilirubin 07/03/2024 0.3     Alkaline Phosphatase 07/03/2024 86     AST 07/03/2024 24     ALT 07/03/2024 21     eGFR 07/03/2024 >60.0     Anion Gap 07/03/2024 7 (L)     Cholesterol 07/03/2024 216 (H)     Triglycerides 07/03/2024 134     HDL 07/03/2024 57     LDL Cholesterol 07/03/2024 132.2     HDL/Cholesterol Ratio 07/03/2024 26.4     Total Cholesterol/HDL Ra* 07/03/2024 3.8     Non-HDL Cholesterol 07/03/2024 159     Hemoglobin A1C 07/03/2024 9.1 (H)     Estimated Avg Glucose 07/03/2024 214 (H)    Lab Visit on 07/03/2024   Component Date Value    Microalbumin, Urine 07/03/2024 51.0     Creatinine, Urine 07/03/2024 42.0     Microalb/Creat Ratio 07/03/2024 121.4 (H)        Assessment:       1. Type 2 diabetes mellitus with hyperglycemia, with long-term current use of insulin    2. Hypertension associated with diabetes    3. Hyperlipidemia associated with type 2 diabetes mellitus    4. Multiple falls    5. Paresthesia of both hands    6. Type 2 diabetes mellitus without complication, without long-term current use of insulin        Plan:   Hypertension associated with diabetes  Blood pressure is under good control.  We will continue the current regimen.  Will work on regular aerobic exercise and a low salt diet.      Brianne was seen today for follow up .    Diagnoses and all orders for this visit:    Type 2 diabetes mellitus with hyperglycemia, with long-term current use of insulin  Comments:  Has been off trulicity.  Will  today.  a1c in 3 months  Orders:  -     Hemoglobin A1C; Future  -     WALKER FOR HOME  USE    Hypertension associated with diabetes  -     WALKER FOR HOME USE    Hyperlipidemia associated with type 2 diabetes mellitus  Comments:  Has been off meds.  Will  today.  Resume meds. Update labs in 3 months  Orders:  -     Lipid Panel; Future    Multiple falls  Comments:  refer to PT for strengthening and gait training  Orders:  -     Ambulatory referral/consult to Physical/Occupational Therapy; Future  -     WALKER FOR HOME USE    Paresthesia of both hands  Comments:  Nerve conduction study  Orders:  -     Nerve conduction test; Future    Type 2 diabetes mellitus without complication, without long-term current use of insulin  -     dulaglutide (TRULICITY) 3 mg/0.5 mL pen injector; Inject 3 mg into the skin every 7 days.          Follow up in about 6 weeks (around 8/21/2024) for DM, HLP, falls, with Tom Johnson

## 2024-07-10 NOTE — PATIENT INSTRUCTIONS
Patient Education       Guide to Eating When You Have Diabetes   About this topic   This guide will help you control your blood sugar along with exercise and the drugs you are taking. You want to have a balanced amount of carbohydrates, fats, and protein in your meal. Following these diet guidelines may also help control your blood pressure and cholesterol.     What will the results be?   When you follow these diet guidelines, your blood sugar may be easier to keep within the goal blood sugar ranges. Ask your doctor for your goal blood sugar ranges.  What changes to diet are needed?   You need to balance how much carbohydrate, fat, and protein is in your meals. You also need to control the amount or portion size of the food you eat. Eat meals at about the same time every day. Do not skip a meal. Talk to your dietitian about making a personal meal plan for you.     Who should use this diet?   This diet is helpful to people with high blood sugar or diabetes.   What foods are good to eat?   Whole grains like:  1/3 cup (80 grams) brown rice  1/3 cup (80 grams) wild rice  1/3 cup (80 grams) whole wheat pasta  1 slice whole wheat or whole grain bread  3/4 cup (180 grams) high-fiber cereal  1/2 cup (120 grams) cooked oatmeal  1/2 (120 grams) English muffin  Fruits and vegetables like:  1/2 cup (120 grams) sweet potatoes  1/2 cup (120 grams) cooked vegetables, like squash, green beans, cauliflower, carrots, and cabbage  1 cup (240 grams) raw vegetables or salad greens  1 small apples or oranges  1/2 cup (120 grams) unsweetened fruit juice  Proteins like:  1 ounce (30 grams) lean beef or pork  1 ounce (30 grams) chicken, skin removed  1 ounce (30 grams) turkey, skin removed  1 ounce (30 grams) fish  1 ounce (30 grams) low-fat cheese or lunch meat  1/2 cup (120 grams) cooked beans ? black, kidney, chickpeas, or lentils  1 whole egg  What foods should be limited or avoided?   High fat or processed foods  like:  Ny  Sausage  Hot dogs  Processed snacks  Fats and oils like:  Margarine  Salad dressings  Foods that are high in salt like:  Table salt  Salted breads, rolls, crackers  Commercially-prepared potatoes and vegetable mixes  Canned vegetables and juices  Canned soups  Smoked, cured, or salted meats  Starches that are not whole grain like:  White rice  White potatoes  French fries  Pasta  White bread  Sugary cereals  Instant oatmeal  Baked goods, pastries  Croissants  What can be done to prevent this health problem?   Type 1 diabetes is a lifelong problem and you cannot prevent it. Type 2 diabetes can be prevented or delayed with lifestyle changes. You can still lead a normal life. Diabetes can be managed through diet, exercise, and drugs. Family members and friends can help you practice good health behaviors.  When do I need to call the doctor?   Blood sugar level is above 240 for more than a day  Blood sugar level drops to less than 40  Abnormal urine test results  Trouble breathing  Very sleepy  Throw up more than once  Many loose stools  Questions about your diet plan  Helpful tips   Try to eat smaller portions of a healthy balanced diet throughout the day. Take time to plan your meals and snacks.  Where can I learn more?   American Diabetes Association  https://www.cdc.gov/diabetes/managing/eat-well/meal-plan-method.html   HelpGuide.org  http://www.helpguide.org/home-pages/healthy-eating.htm   HelpGuide.org  http://www.helpguide.org/articles/diet-weight-loss/diabetes-diet-and-food-tips.htm   Last Reviewed Date   2021-07-21  Consumer Information Use and Disclaimer   This information is not specific medical advice and does not replace information you receive from your health care provider. This is only a brief summary of general information. It does NOT include all information about conditions, illnesses, injuries, tests, procedures, treatments, therapies, discharge instructions or life-style choices that  may apply to you. You must talk with your health care provider for complete information about your health and treatment options. This information should not be used to decide whether or not to accept your health care providers advice, instructions or recommendations. Only your health care provider has the knowledge and training to provide advice that is right for you.   Copyright   Copyright © 2021 [employer] and its affiliates and/or licensors. All rights reserved.

## 2024-07-17 ENCOUNTER — TELEPHONE (OUTPATIENT)
Dept: NEUROLOGY | Facility: CLINIC | Age: 79
End: 2024-07-17
Payer: MEDICARE

## 2024-07-23 ENCOUNTER — TELEPHONE (OUTPATIENT)
Dept: INTERNAL MEDICINE | Facility: CLINIC | Age: 79
End: 2024-07-23
Payer: MEDICARE

## 2024-07-23 NOTE — TELEPHONE ENCOUNTER
Called pt and emergency contact daughter. No answer, left vm on both numbers. Sent [facility] message as well

## 2024-07-23 NOTE — TELEPHONE ENCOUNTER
[facility]uent approved at $45 a month.  They have tried to reach her to see if she is okay with the price but have not been able to make contact.  Please reach out to the patient and their listed contacts to address this question.      If they are okay with the copay and proceeding, we need to let the specialty pharmacy know to move forward.

## 2024-07-30 ENCOUNTER — TELEPHONE (OUTPATIENT)
Dept: INTERNAL MEDICINE | Facility: CLINIC | Age: 79
End: 2024-07-30
Payer: MEDICARE

## 2024-08-12 ENCOUNTER — TELEPHONE (OUTPATIENT)
Dept: SURGERY | Facility: CLINIC | Age: 79
End: 2024-08-12
Payer: MEDICARE

## 2024-08-20 ENCOUNTER — TELEPHONE (OUTPATIENT)
Dept: NEUROLOGY | Facility: CLINIC | Age: 79
End: 2024-08-20
Payer: MEDICARE

## 2024-09-23 DIAGNOSIS — E11.9 TYPE 2 DIABETES MELLITUS WITHOUT COMPLICATION, WITHOUT LONG-TERM CURRENT USE OF INSULIN: ICD-10-CM

## 2024-09-23 NOTE — TELEPHONE ENCOUNTER
No care due was identified.  [facility] Embedded Care Due Messages. Reference number: [id number].   9/23/2024 1:26:32 PM CDT

## 2024-09-24 RX ORDER — METFORMIN HYDROCHLORIDE 1000 MG/1
1000 TABLET ORAL 2 TIMES DAILY WITH MEALS
Qty: 180 TABLET | Refills: 1 | Status: SHIPPED | OUTPATIENT
Start: 2024-09-24

## 2024-09-24 NOTE — TELEPHONE ENCOUNTER
Refill Decision Note   Brianne Veronica  is requesting a refill authorization.  Brief Assessment and Rationale for Refill:  Approve     Medication Therapy Plan:         Comments:     Note composed:12:35 AM 09/24/2024

## 2024-11-08 ENCOUNTER — OFFICE VISIT (OUTPATIENT)
Dept: PRIMARY CARE CLINIC | Facility: CLINIC | Age: 79
End: 2024-11-08
Payer: MEDICARE

## 2024-11-08 VITALS
HEIGHT: 62 IN | BODY MASS INDEX: 20.22 KG/M2 | HEART RATE: 78 BPM | SYSTOLIC BLOOD PRESSURE: 122 MMHG | WEIGHT: 109.88 LBS | OXYGEN SATURATION: 99 % | TEMPERATURE: 97 F | DIASTOLIC BLOOD PRESSURE: 76 MMHG

## 2024-11-08 DIAGNOSIS — E78.5 HYPERLIPIDEMIA ASSOCIATED WITH TYPE 2 DIABETES MELLITUS: ICD-10-CM

## 2024-11-08 DIAGNOSIS — I15.2 HYPERTENSION ASSOCIATED WITH DIABETES: ICD-10-CM

## 2024-11-08 DIAGNOSIS — M79.18 MUSCULOSKELETAL PAIN: ICD-10-CM

## 2024-11-08 DIAGNOSIS — Z23 NEED FOR VACCINATION: ICD-10-CM

## 2024-11-08 DIAGNOSIS — E11.59 HYPERTENSION ASSOCIATED WITH DIABETES: ICD-10-CM

## 2024-11-08 DIAGNOSIS — Z76.89 ENCOUNTER TO ESTABLISH CARE WITH NEW DOCTOR: Primary | ICD-10-CM

## 2024-11-08 DIAGNOSIS — F41.8 MIXED ANXIETY AND DEPRESSIVE DISORDER: ICD-10-CM

## 2024-11-08 DIAGNOSIS — E11.65 TYPE 2 DIABETES MELLITUS WITH HYPERGLYCEMIA, WITH LONG-TERM CURRENT USE OF INSULIN: ICD-10-CM

## 2024-11-08 DIAGNOSIS — E11.69 HYPERLIPIDEMIA ASSOCIATED WITH TYPE 2 DIABETES MELLITUS: ICD-10-CM

## 2024-11-08 DIAGNOSIS — Z79.4 TYPE 2 DIABETES MELLITUS WITH HYPERGLYCEMIA, WITH LONG-TERM CURRENT USE OF INSULIN: ICD-10-CM

## 2024-11-08 LAB
ALBUMIN SERPL BCP-MCNC: 4.2 G/DL (ref 3.5–5.2)
ALP SERPL-CCNC: 50 U/L (ref 40–150)
ALT SERPL W/O P-5'-P-CCNC: 28 U/L (ref 10–44)
ANION GAP SERPL CALC-SCNC: 14 MMOL/L (ref 8–16)
AST SERPL-CCNC: 30 U/L (ref 10–40)
BILIRUB SERPL-MCNC: 0.5 MG/DL (ref 0.1–1)
BUN SERPL-MCNC: 15 MG/DL (ref 8–23)
CALCIUM SERPL-MCNC: 10 MG/DL (ref 8.7–10.5)
CHLORIDE SERPL-SCNC: 103 MMOL/L (ref 95–110)
CHOLEST SERPL-MCNC: 190 MG/DL (ref 120–199)
CHOLEST/HDLC SERPL: 3.5 {RATIO} (ref 2–5)
CO2 SERPL-SCNC: 23 MMOL/L (ref 23–29)
CREAT SERPL-MCNC: 0.8 MG/DL (ref 0.5–1.4)
EST. GFR  (NO RACE VARIABLE): >60 ML/MIN/1.73 M^2
ESTIMATED AVG GLUCOSE: 126 MG/DL (ref 68–131)
GLUCOSE SERPL-MCNC: 100 MG/DL (ref 70–110)
HBA1C MFR BLD: 6 % (ref 4–5.6)
HDLC SERPL-MCNC: 55 MG/DL (ref 40–75)
HDLC SERPL: 28.9 % (ref 20–50)
LDLC SERPL CALC-MCNC: 117.2 MG/DL (ref 63–159)
NONHDLC SERPL-MCNC: 135 MG/DL
POTASSIUM SERPL-SCNC: 4.2 MMOL/L (ref 3.5–5.1)
PROT SERPL-MCNC: 7.6 G/DL (ref 6–8.4)
SODIUM SERPL-SCNC: 140 MMOL/L (ref 136–145)
TRIGL SERPL-MCNC: 89 MG/DL (ref 30–150)

## 2024-11-08 PROCEDURE — 99999 PR PBB SHADOW E&M-EST. PATIENT-LVL V: CPT | Mod: PBBFAC,,, | Performed by: FAMILY MEDICINE

## 2024-11-08 PROCEDURE — 80053 COMPREHEN METABOLIC PANEL: CPT | Performed by: FAMILY MEDICINE

## 2024-11-08 PROCEDURE — 83036 HEMOGLOBIN GLYCOSYLATED A1C: CPT | Performed by: FAMILY MEDICINE

## 2024-11-08 PROCEDURE — 80061 LIPID PANEL: CPT | Performed by: FAMILY MEDICINE

## 2024-11-08 NOTE — PROGRESS NOTES
Pt identified by name & .  Flu vaccine administered and information sheet given. Pt tolerated well and instructed to wait in clinic for 15 minutes.  Verbalized understanding.    Pt identified by name & . Reviewed allergies and medications. Labs collected. Pt tolerated well.

## 2024-11-08 NOTE — ASSESSMENT & PLAN NOTE
Lab Results   Component Value Date    LDLCALC 132.2 07/03/2024    Our of meds a few mo ago. Check level today

## 2024-11-08 NOTE — PATIENT INSTRUCTIONS
Lab today  Return a mo  Bring all your medication to the visit  Tylenol for pain    If you are feeling unwell, we'd like to be the first ones to know here at [facility]! Please give us a call. Same day appointments are our top priority to keep you well and out of the emergency rooms and hospitals. Call [phone] for our direct line. After hours advice is always available. Please call [phone] after hours to speak to the on-call team.

## 2024-11-08 NOTE — PROGRESS NOTES
Brianne Martin  11/08/2024  [id number]    Bird Khan MD  Patient Care Team:  Bird Khan MD as PCP - General (Family Medicine)  Natalya Gloria PA as Physician Assistant (Internal Medicine)  Enid Maynard, RN as Registered Nurse  Tory Rossi MA  Future Appointments       Date Provider Specialty Appt Notes    12/6/2024 Bird Khan MD Primary Care 1 month f/u    12/11/2024  Lab labs    12/11/2024 Maggie Jacobsen MD Rheumatology OP labs Prolia 6 months f/u//MM    12/11/2024  Chemotherapy Prolia             Chief Complaint:  Chief Complaint   Patient presents with    Establish Care    Knee Pain    Back Pain       History of Present Illness:     Lives alone in senior apt alone past month. Does her own cooking light cleaning. Not driving    Recent Fall:  [x]Yes  []No 4-5 times recently  Activity:  []Vigorous []Moderate [x]Sedentary  Appetite:  []Good  [x]Fair  []Poor  Mood: []Stable []Anxious []Depressed   Insomnia: []Yes  []No  Weight stable []Yes  [x]No lsot 15 lbs past year, due to living stress. Living situation improved past mo  Bowel/bladder control []Yes  []No occa urinary incontinence w stress and urge. Uses Depends daily past year    Advance Care Planning     Power of   I initiated the process of voluntary advance care planning today and explained the importance of this process to the patient.  I introduced the concept of advance directives to the patient, as well. Then the patient received detailed information about the importance of designating a Health Care Power of  (HCPOA). She was also instructed to communicate with this person about their wishes for future healthcare, should she become sick and lose decision-making capacity. The patient has previously appointed a HCPOA. After our discussion, the patient has decided to complete a HCPOA and has appointed her son and daughter in law , health care agent:  Priscilla  & health care agent number:  see file . I encouraged her to communicate  Medication: lisinopril (ZESTRIL) 10 MG tablet      levothyroxine 75 MCG tablet   passed protocol.   Last office visit date: 09/30/24  Next appointment scheduled?: No;  Return in about 6 months (around 3/30/2025) for medicare wellness.    Number of refills given: 90 x 1      Medication:     fenofibrate (TRICOR) 145 MG tablet       Medication Refill Protocol Failed.  Medication Refill Protocol Failed. Courtesy Refill provided after  Patient is NOT on Gemfibrozil and a Statin or Statin Combination medication per protocol guidelines. Writer confirmed, courtesy refill was not a duplicate.   with this person about their wishes for future healthcare, should she become sick and lose decision-making capacity.      A total of 10 min was spent on advance care planning, goals of care discussion, emotional support, formulating and communicating prognosis and exploring burden/benefit of various approaches of treatment. This discussion occurred on a fully voluntary basis with the verbal consent of the patient and/or family.       Review of Systems   Constitutional:  Positive for unexpected weight change (lost 15 lbs past year due to stress and poor appetie. situation resolved). Negative for chills and fever.   Respiratory:  Negative for shortness of breath.    Cardiovascular:  Negative for chest pain, palpitations and leg swelling.   Genitourinary:  Positive for bladder incontinence (wears depends).   Musculoskeletal:  Positive for arthralgias (right ankle where old fx was, back, right knee past few days) and gait problem (weak, uses a 4 prong cane).      See HPI above  PHQ-4 Score: 0    Exam:  Vitals:    11/08/24 0903   BP: 122/76   Pulse: 78   Temp: 96.6 °F (35.9 °C)     Weight: 49.9 kg (109 lb 14.4 oz)   Body mass index is 20.1 kg/m².    BP Readings from Last 3 Encounters:   11/08/24 122/76   07/10/24 138/80   06/10/24 (!) 155/80        Wt Readings from Last 3 Encounters:   11/08/24 0903 49.9 kg (109 lb 14.4 oz)   07/10/24 0936 54.4 kg (119 lb 14.9 oz)   06/10/24 1259 53.3 kg (117 lb 8.1 oz)        Physical Exam  Vitals and nursing note reviewed.   Constitutional:       General: She is not in acute distress.     Appearance: She is not toxic-appearing.      Comments: Frail elderly. Stooped posture   HENT:      Head: Normocephalic and atraumatic.   Eyes:      Extraocular Movements: Extraocular movements intact.      Pupils: Pupils are equal, round, and reactive to light.   Cardiovascular:      Rate and Rhythm: Normal rate and regular rhythm.      Pulses: Normal pulses.      Heart sounds: Normal heart sounds.    Pulmonary:      Effort: Pulmonary effort is normal.      Breath sounds: Normal breath sounds.   Musculoskeletal:      Right lower leg: No edema.      Left lower leg: No edema.   Lymphadenopathy:      Cervical: No cervical adenopathy.   Skin:     General: Skin is warm.      Capillary Refill: Capillary refill takes less than 2 seconds.   Neurological:      General: No focal deficit present.      Mental Status: She is oriented to person, place, and time.          Laboratory Reviewed    Lab Results   Component Value Date    WBC 4.86 07/03/2024    HGB 12.0 07/03/2024    HCT 36.9 (L) 07/03/2024     07/03/2024    CHOL 216 (H) 07/03/2024    TRIG 134 07/03/2024    HDL 57 07/03/2024    ALT 21 07/03/2024    AST 24 07/03/2024     07/03/2024    K 4.6 07/03/2024     07/03/2024    CREATININE 0.9 07/03/2024    BUN 14 07/03/2024    CO2 28 07/03/2024    TSH 0.806 05/03/2010    HGBA1C 9.1 (H) 07/03/2024         Health Maintenance  Health Maintenance Topics with due status: Not Due       Topic Last Completion Date    TETANUS VACCINE 11/29/2015    DEXA Scan 08/17/2023    Eye Exam 04/03/2024    Mammogram 06/26/2024    Diabetes Urine Screening 07/03/2024    Lipid Panel 07/03/2024     Health Maintenance Due   Topic Date Due    RSV Vaccine (Age 60+ and Pregnant patients) (1 - 1-dose 75+ series) Never done    Colonoscopy  04/21/2022    Influenza Vaccine (1) 09/01/2024    COVID-19 Vaccine (10 - 2024-25 season) 09/01/2024    Hemoglobin A1c  10/03/2024       Assessment and Plan   79 y.o. female with multiple co-morbid illnesses here for continued follow up of medical problems.      The primary encounter diagnosis was Encounter to establish care with new doctor. Diagnoses of Mixed anxiety and depressive disorder, Hyperlipidemia associated with type 2 diabetes mellitus, Hypertension associated with diabetes, Type 2 diabetes mellitus with hyperglycemia, with long-term current use of insulin, and Musculoskeletal pain were also  pertinent to this visit.  1. Encounter to establish care with new doctor  Comments:  major living situation change and stress level reduction past mo. will get baseline lab, then review treatment plan    2. Mixed anxiety and depressive disorder  Overview:  Effexor 75 mg     Assessment & Plan:  Improved since living situation change last mo      3. Hyperlipidemia associated with type 2 diabetes mellitus  Overview:  Hyperlipidemia Medications               omega-3 acid ethyl esters (LOVAZA) 1 gram capsule TAKE 2 CAPSULES TWICE DAILY    omega-3 fatty acids/fish oil (FISH OIL-OMEGA-3 FATTY ACIDS) 300-1,000 mg capsule Take 2 capsules by mouth 2 (two) times a day.    simvastatin (ZOCOR) 40 MG tablet Take 1 tablet (40 mg total) by mouth once daily.    VASCEPA 1 gram Cap Take 2 capsules by mouth 2 (two) times daily.             Assessment & Plan:  Lab Results   Component Value Date    LDLCALC 132.2 07/03/2024    Our of meds a few mo ago. Check level today       Orders:  -     LIPID PANEL; Future; Expected date: 11/08/2024    4. Hypertension associated with diabetes  Overview:  Hypertension Medications               amLODIPine (NORVASC) 10 MG tablet Take 1 tablet (10 mg total) by mouth every morning.    losartan (COZAAR) 100 MG tablet Take 1 tablet (100 mg total) by mouth once daily. For blood pressure             Assessment & Plan:  At goal. Cont current mgmt       5. Type 2 diabetes mellitus with hyperglycemia, with long-term current use of insulin  Overview:  Hyperlipidemia Medications               omega-3 acid ethyl esters (LOVAZA) 1 gram capsule TAKE 2 CAPSULES TWICE DAILY    omega-3 fatty acids/fish oil (FISH OIL-OMEGA-3 FATTY ACIDS) 300-1,000 mg capsule Take 2 capsules by mouth 2 (two) times a day.    simvastatin (ZOCOR) 40 MG tablet Take 1 tablet (40 mg total) by mouth once daily.    VASCEPA 1 gram Cap Take 2 capsules by mouth 2 (two) times daily.             Assessment & Plan:  Out of meds several mo ago, A1c went  up to 9. Rpt level today    Orders:  -     HEMOGLOBIN A1C; Future; Expected date: 11/08/2024  -     COMPREHENSIVE METABOLIC PANEL; Future; Expected date: 11/08/2024    6. Musculoskeletal pain  Comments:  flare ups. pt thinks it's due to weather, hurricane coming. symptomatic management        Health Maintenance         Date Due Completion Date    RSV Vaccine (Age 60+ and Pregnant patients) (1 - 1-dose 75+ series) Never done ---    Colonoscopy 04/21/2022 4/21/2021    Influenza Vaccine (1) 09/01/2024 9/28/2023    Override on 10/8/2019: Done    COVID-19 Vaccine (10 - 2024-25 season) 09/01/2024 12/14/2022    Hemoglobin A1c 10/03/2024 7/3/2024    Override on 7/9/2014: Done ([facility] - 9.1)    Eye Exam 04/03/2025 4/3/2024    Override on 6/21/2012: Done    Mammogram 06/26/2025 6/26/2024    Override on 9/11/2019: Done    Override on 5/3/2010: Done    Diabetes Urine Screening 07/03/2025 7/3/2024    Lipid Panel 07/03/2025 7/3/2024    Override on 7/9/2014: Done (total 187, hdl 44, tg 185, ldl 106)    DEXA Scan 08/17/2025 8/17/2023    Override on 5/10/2010: Done    TETANUS VACCINE 11/29/2025 11/29/2015            Discharge instructions          Follow up: Follow up in about 4 weeks (around 12/6/2024).

## 2024-12-13 ENCOUNTER — OFFICE VISIT (OUTPATIENT)
Dept: PRIMARY CARE CLINIC | Facility: CLINIC | Age: 79
End: 2024-12-13
Payer: MEDICARE

## 2024-12-13 VITALS
DIASTOLIC BLOOD PRESSURE: 60 MMHG | WEIGHT: 111.88 LBS | BODY MASS INDEX: 20.59 KG/M2 | TEMPERATURE: 98 F | HEIGHT: 62 IN | SYSTOLIC BLOOD PRESSURE: 118 MMHG | OXYGEN SATURATION: 99 % | HEART RATE: 72 BPM

## 2024-12-13 DIAGNOSIS — E11.59 HYPERTENSION ASSOCIATED WITH DIABETES: ICD-10-CM

## 2024-12-13 DIAGNOSIS — M25.511 PAIN IN JOINT OF RIGHT SHOULDER: ICD-10-CM

## 2024-12-13 DIAGNOSIS — I15.2 HYPERTENSION ASSOCIATED WITH DIABETES: ICD-10-CM

## 2024-12-13 DIAGNOSIS — E78.5 HYPERLIPIDEMIA LDL GOAL <70: ICD-10-CM

## 2024-12-13 DIAGNOSIS — E11.65 TYPE 2 DIABETES MELLITUS WITH HYPERGLYCEMIA, WITHOUT LONG-TERM CURRENT USE OF INSULIN: ICD-10-CM

## 2024-12-13 DIAGNOSIS — Z74.09 MOBILITY IMPAIRED: ICD-10-CM

## 2024-12-13 DIAGNOSIS — Z79.899 MEDICATION MANAGEMENT: Primary | ICD-10-CM

## 2024-12-13 PROCEDURE — 99999 PR PBB SHADOW E&M-EST. PATIENT-LVL III: CPT | Mod: PBBFAC,,, | Performed by: FAMILY MEDICINE

## 2024-12-13 RX ORDER — DEXTROSE 4 G
TABLET,CHEWABLE ORAL
Qty: 1 EACH | Refills: 0 | Status: SHIPPED | OUTPATIENT
Start: 2024-12-13 | End: 2026-01-10

## 2024-12-13 RX ORDER — MELOXICAM 7.5 MG/1
7.5 TABLET ORAL NIGHTLY PRN
Qty: 30 TABLET | Refills: 3 | Status: SHIPPED | OUTPATIENT
Start: 2024-12-13

## 2024-12-13 RX ORDER — ROSUVASTATIN CALCIUM 20 MG/1
20 TABLET, COATED ORAL DAILY
Qty: 90 TABLET | Refills: 3 | Status: SHIPPED | OUTPATIENT
Start: 2024-12-13 | End: 2025-12-13

## 2024-12-13 NOTE — ASSESSMENT & PLAN NOTE
Lab Results   Component Value Date    HGBA1C 6.0 (H) 11/08/2024    HGBA1C 9.1 (H) 07/03/2024    HGBA1C 6.0 (H) 04/03/2024    Controlled - cont current meds

## 2024-12-13 NOTE — PROGRESS NOTES
Brianne Martin  12/13/2024  [id number]    Bird Khan MD  Patient Care Team:  Bird Khan MD as PCP - General (Family Medicine)  Natalya Gloria PA as Physician Assistant (Internal Medicine)  Enid Maynard, RN as Registered Nurse  Tory Rossi MA  Future Appointments       Date Provider Specialty Appt Notes    3/7/2025 Bird Khan MD Primary Care 3 mth f/u             Chief Complaint:  Chief Complaint   Patient presents with    Follow-up     1 month f/u     Referral     Glucose monitor     Shoulder Pain       History of Present Illness:     One mo follow up, here with caretaker daughter in law  Need a new glucose monitor  Shoulder pain on right, knee pain bilat - old OA flare up. Need meloxicam rf. Taking tylenol 650  Brought all the current medications for me to exam and clarify - significant amount of time spent on it    Recent Fall:  []Yes  [x]No  Activity:  []Vigorous []Moderate [x]Sedentary  Appetite:  [x]Good  []Fair  []Poor  Mood: []Stable [x]Anxious []Depressed   Insomnia: []Yes  [x]No  Weight stable [x]Yes  []No  Bowel/bladder control []Yes  [x]No  Lives alone, fam nearby. Does all her ADLs. Need help w IADL    Review of Systems   Constitutional:  Negative for fever.   Respiratory:  Negative for shortness of breath.    Cardiovascular:  Negative for chest pain, palpitations and leg swelling.      See HPI above  PHQ-4 Score: 0    Exam:  Vitals:    12/13/24 0945   BP: 118/60   Pulse: 72   Temp: 97.6 °F (36.4 °C)     Weight: 50.8 kg (111 lb 14.1 oz)   Body mass index is 20.46 kg/m².    BP Readings from Last 3 Encounters:   12/13/24 118/60   11/08/24 122/76   07/10/24 138/80        Wt Readings from Last 3 Encounters:   12/13/24 0945 50.8 kg (111 lb 14.1 oz)   11/08/24 0903 49.9 kg (109 lb 14.4 oz)   07/10/24 0936 54.4 kg (119 lb 14.9 oz)        Physical Exam  Vitals and nursing note reviewed.   Constitutional:       General: She is not in acute distress.     Appearance: She is not toxic-appearing.       Comments: Frail elderly. Stooped posture, walking with a cane   HENT:      Head: Normocephalic and atraumatic.   Eyes:      Extraocular Movements: Extraocular movements intact.      Pupils: Pupils are equal, round, and reactive to light.   Cardiovascular:      Rate and Rhythm: Normal rate and regular rhythm.      Pulses: Normal pulses.      Heart sounds: Normal heart sounds.   Pulmonary:      Effort: Pulmonary effort is normal.      Breath sounds: Normal breath sounds.   Musculoskeletal:      Right lower leg: No edema.      Left lower leg: No edema.   Lymphadenopathy:      Cervical: No cervical adenopathy.   Skin:     General: Skin is warm.      Capillary Refill: Capillary refill takes less than 2 seconds.   Neurological:      General: No focal deficit present.      Mental Status: She is oriented to person, place, and time.          Laboratory Reviewed    Lab Results   Component Value Date    WBC 4.86 07/03/2024    HGB 12.0 07/03/2024    HCT 36.9 (L) 07/03/2024     07/03/2024    CHOL 190 11/08/2024    TRIG 89 11/08/2024    HDL 55 11/08/2024    ALT 28 11/08/2024    AST 30 11/08/2024     11/08/2024    K 4.2 11/08/2024     11/08/2024    CREATININE 0.8 11/08/2024    BUN 15 11/08/2024    CO2 23 11/08/2024    TSH 0.806 05/03/2010    HGBA1C 6.0 (H) 11/08/2024         Health Maintenance  Health Maintenance Topics with due status: Not Due       Topic Last Completion Date    TETANUS VACCINE 11/29/2015    DEXA Scan 08/17/2023    Eye Exam 04/03/2024    Mammogram 06/26/2024    Diabetes Urine Screening 07/03/2024    Lipid Panel 11/08/2024    Hemoglobin A1c 11/08/2024     Health Maintenance Due   Topic Date Due    RSV Vaccine (Age 60+ and Pregnant patients) (1 - 1-dose 75+ series) Never done    Colonoscopy  04/21/2022    COVID-19 Vaccine (10 - 2024-25 season) 09/01/2024       Assessment and Plan     Medication management  Comments:  20 minutes spent sorting and verifying medications    Pain in joint of right  shoulder  -     meloxicam (MOBIC) 7.5 MG tablet; Take 1 tablet (7.5 mg total) by mouth nightly as needed for Pain.  Dispense: 30 tablet; Refill: 3    Mobility impaired    Hypertension associated with diabetes  Assessment & Plan:  At goal. Cont current mgmt       Type 2 diabetes mellitus with hyperglycemia, without long-term current use of insulin  Assessment & Plan:  Lab Results   Component Value Date    HGBA1C 6.0 (H) 11/08/2024    HGBA1C 9.1 (H) 07/03/2024    HGBA1C 6.0 (H) 04/03/2024    Controlled - cont current meds    Orders:  -     blood-glucose meter (ACCU-CHEK GUIDE ME GLUCOSE MTR) Misc; To check BG 3 times daily, to use with insurance preferred meter  Dispense: 1 each; Refill: 0  -     blood sugar diagnostic (TRUE METRIX GLUCOSE TEST STRIP) Strp; Use to test blood glucose three (3) times a day, to be used with insurance-preferred brand of glucometer/supplies.  Dispense: 300 strip; Refill: 3    Hyperlipidemia LDL goal <70  -     rosuvastatin (CRESTOR) 20 MG tablet; Take 1 tablet (20 mg total) by mouth once daily.  Dispense: 90 tablet; Refill: 3         Health Maintenance         Date Due Completion Date    RSV Vaccine (Age 60+ and Pregnant patients) (1 - 1-dose 75+ series) Never done ---    Colonoscopy 04/21/2022 4/21/2021    COVID-19 Vaccine (10 - 2024-25 season) 09/01/2024 12/14/2022    Eye Exam 04/03/2025 4/3/2024    Override on 6/21/2012: Done    Hemoglobin A1c 05/08/2025 11/8/2024    Override on 7/9/2014: Done ([facility] - 9.1)    Mammogram 06/26/2025 6/26/2024    Override on 9/11/2019: Done    Override on 5/3/2010: Done    Diabetes Urine Screening 07/03/2025 7/3/2024    DEXA Scan 08/17/2025 8/17/2023    Override on 5/10/2010: Done    Lipid Panel 11/08/2025 11/8/2024    Override on 7/9/2014: Done (total 187, hdl 44, tg 185, ldl 106)    TETANUS VACCINE 11/29/2025 11/29/2015            Discharge instructions     Change cholesterol med from simvastatin to rosuvastatin      Follow up: Follow up in about 12  weeks (around 3/7/2025).

## 2024-12-13 NOTE — ASSESSMENT & PLAN NOTE
Declined PT due to cost  Will do home exercise based on previous PT's instruction, to strengthen leg muscles

## 2024-12-13 NOTE — PATIENT INSTRUCTIONS
Change cholesterol med from simvastatin to rosuvastatin     If you are feeling unwell, we'd like to be the first ones to know here at [facility]! Please give us a call. Same day appointments are our top priority to keep you well and out of the emergency rooms and hospitals. Call [phone] for our direct line. After hours advice is always available. Please call [phone] after hours to speak to the on-call team.

## 2024-12-27 DIAGNOSIS — E11.65 TYPE 2 DIABETES MELLITUS WITH HYPERGLYCEMIA, WITHOUT LONG-TERM CURRENT USE OF INSULIN: ICD-10-CM

## 2024-12-30 RX ORDER — DEXTROSE 4 G
TABLET,CHEWABLE ORAL
Qty: 1 EACH | Refills: 0 | Status: SHIPPED | OUTPATIENT
Start: 2024-12-30 | End: 2026-01-24

## 2025-01-25 DIAGNOSIS — E11.9 TYPE 2 DIABETES MELLITUS WITHOUT COMPLICATION, WITHOUT LONG-TERM CURRENT USE OF INSULIN: ICD-10-CM

## 2025-01-27 NOTE — TELEPHONE ENCOUNTER
Refill Routing Note   Medication(s) are not appropriate for processing by [facility] for the following reason(s):        Non-participating provider    [facility] action(s):  Route             Appointments  past 12m or future 3m with PCP    Date Provider   Last Visit   12/13/2024 Bird Khan MD   Next Visit   3/7/2025 Bird Khan MD   ED visits in past 90 days: 0        Note composed:8:31 AM 01/27/2025

## 2025-02-21 RX ORDER — DULAGLUTIDE 3 MG/.5ML
3 INJECTION, SOLUTION SUBCUTANEOUS
Qty: 12 PEN | Refills: 3 | Status: SHIPPED | OUTPATIENT
Start: 2025-02-21

## 2025-03-07 ENCOUNTER — OFFICE VISIT (OUTPATIENT)
Dept: PRIMARY CARE CLINIC | Facility: CLINIC | Age: 80
End: 2025-03-07
Payer: MEDICARE

## 2025-03-07 VITALS
DIASTOLIC BLOOD PRESSURE: 62 MMHG | BODY MASS INDEX: 20.05 KG/M2 | SYSTOLIC BLOOD PRESSURE: 116 MMHG | TEMPERATURE: 98 F | HEIGHT: 62 IN | OXYGEN SATURATION: 98 % | WEIGHT: 108.94 LBS | HEART RATE: 86 BPM

## 2025-03-07 DIAGNOSIS — E78.5 HYPERLIPIDEMIA LDL GOAL <70: ICD-10-CM

## 2025-03-07 DIAGNOSIS — K21.9 GASTROESOPHAGEAL REFLUX DISEASE, UNSPECIFIED WHETHER ESOPHAGITIS PRESENT: ICD-10-CM

## 2025-03-07 DIAGNOSIS — E11.59 HYPERTENSION ASSOCIATED WITH DIABETES: ICD-10-CM

## 2025-03-07 DIAGNOSIS — I15.2 HYPERTENSION ASSOCIATED WITH DIABETES: ICD-10-CM

## 2025-03-07 DIAGNOSIS — M81.0 OSTEOPOROSIS, POSTMENOPAUSAL: ICD-10-CM

## 2025-03-07 DIAGNOSIS — E11.65 TYPE 2 DIABETES MELLITUS WITH HYPERGLYCEMIA, WITHOUT LONG-TERM CURRENT USE OF INSULIN: Primary | ICD-10-CM

## 2025-03-07 PROCEDURE — 99999 PR PBB SHADOW E&M-EST. PATIENT-LVL V: CPT | Mod: PBBFAC,,, | Performed by: FAMILY MEDICINE

## 2025-03-07 PROCEDURE — 99214 OFFICE O/P EST MOD 30 MIN: CPT | Mod: S$GLB,,, | Performed by: FAMILY MEDICINE

## 2025-03-07 PROCEDURE — 1157F ADVNC CARE PLAN IN RCRD: CPT | Mod: CPTII,S$GLB,, | Performed by: FAMILY MEDICINE

## 2025-03-07 PROCEDURE — 1160F RVW MEDS BY RX/DR IN RCRD: CPT | Mod: CPTII,S$GLB,, | Performed by: FAMILY MEDICINE

## 2025-03-07 PROCEDURE — 3288F FALL RISK ASSESSMENT DOCD: CPT | Mod: CPTII,S$GLB,, | Performed by: FAMILY MEDICINE

## 2025-03-07 PROCEDURE — 1159F MED LIST DOCD IN RCRD: CPT | Mod: CPTII,S$GLB,, | Performed by: FAMILY MEDICINE

## 2025-03-07 PROCEDURE — 3074F SYST BP LT 130 MM HG: CPT | Mod: CPTII,S$GLB,, | Performed by: FAMILY MEDICINE

## 2025-03-07 PROCEDURE — 1101F PT FALLS ASSESS-DOCD LE1/YR: CPT | Mod: CPTII,S$GLB,, | Performed by: FAMILY MEDICINE

## 2025-03-07 PROCEDURE — 3078F DIAST BP <80 MM HG: CPT | Mod: CPTII,S$GLB,, | Performed by: FAMILY MEDICINE

## 2025-03-07 PROCEDURE — G2211 COMPLEX E/M VISIT ADD ON: HCPCS | Mod: S$GLB,,, | Performed by: FAMILY MEDICINE

## 2025-03-07 PROCEDURE — 1125F AMNT PAIN NOTED PAIN PRSNT: CPT | Mod: CPTII,S$GLB,, | Performed by: FAMILY MEDICINE

## 2025-03-07 RX ORDER — OMEPRAZOLE 40 MG/1
40 CAPSULE, DELAYED RELEASE ORAL EVERY MORNING
Qty: 90 CAPSULE | Refills: 3 | Status: SHIPPED | OUTPATIENT
Start: 2025-03-07

## 2025-03-07 NOTE — PROGRESS NOTES
rBianne Martin  03/10/2025  [id number]    Bird Khan MD  Patient Care Team:  Bird Khan MD as PCP - General (Family Medicine)  Natalya Gloria PA as Physician Assistant (Internal Medicine)  Enid Maynard, RN as Registered Nurse  Tory Rossi MA    Future Appointments   Date Time Provider Department Center   3/21/2025  9:45 AM Farheen Fontanez DPM [facility] POD [facility]   4/25/2025  1:00 PM Oralia Schwab FNP-C [facility]   6/6/2025  9:40 AM Bird Khan MD [facility]   7/18/2025 10:00 AM Miranda Young MD [facility] OPHTHAL [place] [facility] Primary Care Note    Past Medical History:   Diagnosis Date    Arthritis 12/10/2014    Bell's palsy 02/03/2022    Elevated LFTs 2012    negative workup    Mixed anxiety and depressive disorder     Osteoporosis, postmenopausal     Reflux     Traumatic closed displaced bimalleolar fracture of right ankle 12/25/2013    Type II or unspecified type diabetes mellitus without mention of complication, uncontrolled     Vitamin D deficiency disease       Chief Complaint:  Chief Complaint   Patient presents with    Pain     Generalized          History of Present Illness:  Routine 3 m fv review ongoing issues and address care gaps.    Reports pain right ankle where she had ORIF. Feels screws poking at her skin. Otherwise doing ok.daughter in law in attendance   Need refills for vag cream and PPI for reflux    Active ongoing problems reviewed    Recent Fall:  []Yes  [x]No  Activity:  []Vigorous []Moderate [x]Sedentary  Appetite:  [x]Good  []Fair  []Poor  Mood: []Stable [x]Anxious []Depressed   Insomnia: []Yes  [x]No  Weight stable [x]Yes  []No  Bowel/bladder control []Yes  [x]No  Lives alone, fam nearby. Does all her ADLs. Need help w IADL    Review of patient's allergies indicates:   Allergen Reactions    Ace inhibitors      Other reaction(s): cough     Medications Ordered Prior to Encounter[1]    Review of Systems   Respiratory:  Negative for  shortness of breath.    Cardiovascular:  Negative for chest pain and palpitations.     See HPI above    PHQ-4 Score: 0     Exam:    Physical Exam  Vitals and nursing note reviewed.   Constitutional:       General: She is not in acute distress.     Appearance: She is not toxic-appearing.      Comments: Frail elderly. Stooped posture, walking with a cane   HENT:      Head: Normocephalic and atraumatic.   Eyes:      Extraocular Movements: Extraocular movements intact.      Pupils: Pupils are equal, round, and reactive to light.   Cardiovascular:      Rate and Rhythm: Normal rate and regular rhythm.      Pulses: Normal pulses.      Heart sounds: Normal heart sounds.   Pulmonary:      Effort: Pulmonary effort is normal.      Breath sounds: Normal breath sounds.   Musculoskeletal:         General: Tenderness (right ankle) present.      Right lower leg: No edema.      Left lower leg: No edema.   Lymphadenopathy:      Cervical: No cervical adenopathy.   Skin:     General: Skin is warm.      Capillary Refill: Capillary refill takes less than 2 seconds.   Neurological:      General: No focal deficit present.      Mental Status: She is oriented to person, place, and time.         Vitals:    03/07/25 0952   BP: 116/62   Pulse: 86   Temp: 97.8 °F (36.6 °C)     Weight: 49.4 kg (108 lb 14.5 oz)  BP Readings from Last 3 Encounters:   03/07/25 116/62   12/13/24 118/60   11/08/24 122/76     Wt Readings from Last 3 Encounters:   03/07/25 0952 49.4 kg (108 lb 14.5 oz)   12/13/24 0945 50.8 kg (111 lb 14.1 oz)   11/08/24 0903 49.9 kg (109 lb 14.4 oz)        Laboratory Reviewed:       Lab Results   Component Value Date    WBC 4.86 07/03/2024    HGB 12.0 07/03/2024    HCT 36.9 (L) 07/03/2024     07/03/2024    CHOL 190 11/08/2024    TRIG 89 11/08/2024    HDL 55 11/08/2024    ALT 28 11/08/2024    AST 30 11/08/2024     11/08/2024    K 4.2 11/08/2024     11/08/2024    CREATININE 0.8 11/08/2024    BUN 15 11/08/2024    CO2 23  11/08/2024    TSH 0.806 05/03/2010    HGBA1C 6.0 (H) 11/08/2024       Screening or Prevention Patient's value Goal Complete/Controlled?   HgA1C Testing and Control   Lab Results   Component Value Date    HGBA1C 6.0 (H) 11/08/2024      Annually/Less than 8% Yes   Lipid profile : 11/08/2024 Annually Yes   LDL control Lab Results   Component Value Date    LDLCALC 117.2 11/08/2024    Annually/Less than 100 mg/dl  No   Nephropathy screening Lab Results   Component Value Date    LABMICR 51.0 07/03/2024     Lab Results   Component Value Date    PROTEINUA Negative 12/26/2013    Annually Yes   Blood pressure BP Readings from Last 1 Encounters:   03/07/25 116/62    Less than 140/90 Yes   Dilated retinal exam : 04/03/2024 Annually Yes   Foot exam   Most Recent Foot Exam Date: Not Found Annually Yes       Health Maintenance  Health Maintenance Topics with due status: Not Due       Topic Last Completion Date    TETANUS VACCINE 11/29/2015    DEXA Scan 08/17/2023    Mammogram 06/26/2024    Diabetes Urine Screening 07/03/2024    Lipid Panel 11/08/2024    Hemoglobin A1c 11/08/2024     Health Maintenance Due   Topic Date Due    RSV Vaccine (Age 60+ and Pregnant patients) (1 - 1-dose 75+ series) Never done    Colonoscopy  04/21/2022    COVID-19 Vaccine (10 - 2024-25 season) 09/01/2024    Diabetic Eye Exam  04/03/2025       Assessment/Plan:  1. Type 2 diabetes mellitus with hyperglycemia, without long-term current use of insulin  Overview:  Diabetes Medications               dulaglutide (TRULICITY) 3 mg/0.5 mL pen injector Inject 3 mg into the skin every 7 days.    metFORMIN (GLUCOPHAGE) 1000 MG tablet Take 1 tablet (1,000 mg total) by mouth 2 (two) times daily with meals.             Orders:  -     Ambulatory referral/consult to Podiatry; Future; Expected date: 03/14/2025  -     Ambulatory referral/consult to Ophthalmology; Future; Expected date: 03/14/2025    2. Gastroesophageal reflux disease, unspecified whether esophagitis  present  -     omeprazole (PRILOSEC) 40 MG capsule; Take 1 capsule (40 mg total) by mouth every morning.  Dispense: 90 capsule; Refill: 3    3. Hypertension associated with diabetes  Overview:  Hypertension Medications               amLODIPine (NORVASC) 10 MG tablet Take 1 tablet (10 mg total) by mouth every morning.    losartan (COZAAR) 100 MG tablet Take 1 tablet (100 mg total) by mouth once daily. For blood pressure             Assessment & Plan:  At goal. Cont current mgmt       4. Hyperlipidemia LDL goal <70  Overview:  Hyperlipidemia Medications              rosuvastatin (CRESTOR) 20 MG tablet Take 1 tablet (20 mg total) by mouth once daily.             Assessment & Plan:  Continuing current management.       5. Osteoporosis, postmenopausal  Overview:  Alendronate 70 mg weekly    Assessment & Plan:  Continuing current management.       Other orders  -     conjugated estrogens (PREMARIN) vaginal cream; Apply a pea sized amount PV QHS x 2 weeks. Then, apply 3 times a week.  Dispense: 30 g; Refill: 3          After visit summary printed and given to patient upon discharge. Health maintenance needs, recent test results, current management plans and goals of care discussed with patient/family. All questions answered. Pt and family are encouraged to be part of care team at all times      The following issues were discussed: The primary encounter diagnosis was Type 2 diabetes mellitus with hyperglycemia, without long-term current use of insulin. Diagnoses of Gastroesophageal reflux disease, unspecified whether esophagitis present, Hypertension associated with diabetes, Hyperlipidemia LDL goal <70, and Osteoporosis, postmenopausal were also pertinent to this visit.       Bird Khan MD  [facility] [address] [place] [address]         [1]   Current Outpatient Medications on File Prior to Visit   Medication Sig Dispense Refill    amLODIPine (NORVASC) 10 MG tablet Take 1 tablet (10 mg total)  by mouth every morning. 90 tablet 2    blood sugar diagnostic (TRUE METRIX GLUCOSE TEST STRIP) Strp Use to test blood glucose three (3) times a day, to be used with insurance-preferred brand of glucometer/supplies. 300 strip 3    blood-glucose meter (ACCU-CHEK GUIDE ME GLUCOSE MTR) Misc To check BG 3 times daily, to use with insurance preferred meter 1 each 0    dulaglutide (TRULICITY) 3 mg/0.5 mL pen injector Inject 3 mg into the skin every 7 days. 12 pen 3    fluticasone propionate (FLONASE) 50 mcg/actuation nasal spray 1 spray (50 mcg total) by Each Nostril route once daily. 32 g 3    gabapentin (NEURONTIN) 300 MG capsule Take 1 capsule (300 mg total) by mouth 2 (two) times daily. 180 capsule 3    lancets (ACCU-CHEK SOFTCLIX LANCETS) Misc Use to test blood glucose three (3) times a day, discard lancet after each use 300 each 3    losartan (COZAAR) 100 MG tablet Take 1 tablet (100 mg total) by mouth once daily. For blood pressure 90 tablet 3    meloxicam (MOBIC) 7.5 MG tablet Take 1 tablet (7.5 mg total) by mouth nightly as needed for Pain. 30 tablet 3    metFORMIN (GLUCOPHAGE) 1000 MG tablet Take 1 tablet (1,000 mg total) by mouth 2 (two) times daily with meals. 180 tablet 1    multivitamin Liqd Take by mouth.      rosuvastatin (CRESTOR) 20 MG tablet Take 1 tablet (20 mg total) by mouth once daily. 90 tablet 3    triamcinolone acetonide 0.1% (KENALOG) 0.1 % cream Apply topically 2 (two) times daily.      venlafaxine (EFFEXOR) 75 MG tablet Take 1 tablet (75 mg total) by mouth 2 (two) times daily. 180 tablet 3    white petrolatum-mineral oil 57.3-42.5% (REFRESH P.M.) 57.3-42.5 % Oint Place into the left eye every evening. 3.5 g 2    acetaminophen (TYLENOL) 650 MG TbSR Take 1 tablet (650 mg total) by mouth every 8 (eight) hours. 90 tablet 3    alendronate (FOSAMAX) 70 MG tablet Take 70 mg by mouth every 7 days.      olopatadine (PAZEO) 0.7 % Drop Place 1 drop into both eyes every morning. 5 mL 3     No current  facility-administered medications on file prior to visit.

## 2025-03-13 ENCOUNTER — PATIENT MESSAGE (OUTPATIENT)
Dept: RHEUMATOLOGY | Facility: CLINIC | Age: 80
End: 2025-03-13
Payer: MEDICARE

## 2025-03-21 ENCOUNTER — OFFICE VISIT (OUTPATIENT)
Dept: PODIATRY | Facility: CLINIC | Age: 80
End: 2025-03-21
Payer: MEDICARE

## 2025-03-21 ENCOUNTER — HOSPITAL ENCOUNTER (OUTPATIENT)
Dept: RADIOLOGY | Facility: HOSPITAL | Age: 80
Discharge: HOME | End: 2025-03-21
Attending: PODIATRIST
Payer: MEDICARE

## 2025-03-21 VITALS — BODY MASS INDEX: 20.05 KG/M2 | WEIGHT: 108.94 LBS | HEIGHT: 62 IN

## 2025-03-21 DIAGNOSIS — E11.65 TYPE 2 DIABETES MELLITUS WITH HYPERGLYCEMIA, WITHOUT LONG-TERM CURRENT USE OF INSULIN: ICD-10-CM

## 2025-03-21 DIAGNOSIS — M12.571 TRAUMATIC ARTHRITIS OF RIGHT ANKLE: ICD-10-CM

## 2025-03-21 DIAGNOSIS — M81.0 OSTEOPOROSIS, POSTMENOPAUSAL: ICD-10-CM

## 2025-03-21 DIAGNOSIS — E11.9 ENCOUNTER FOR COMPREHENSIVE DIABETIC FOOT EXAMINATION, TYPE 2 DIABETES MELLITUS: Primary | ICD-10-CM

## 2025-03-21 DIAGNOSIS — T84.84XA PAINFUL ORTHOPAEDIC HARDWARE: ICD-10-CM

## 2025-03-21 PROCEDURE — 73610 X-RAY EXAM OF ANKLE: CPT | Mod: TC,RT

## 2025-03-21 PROCEDURE — 99204 OFFICE O/P NEW MOD 45 MIN: CPT | Mod: 25,S$GLB,, | Performed by: PODIATRIST

## 2025-03-21 PROCEDURE — 1125F AMNT PAIN NOTED PAIN PRSNT: CPT | Mod: CPTII,S$GLB,, | Performed by: PODIATRIST

## 2025-03-21 PROCEDURE — 99999 PR PBB SHADOW E&M-EST. PATIENT-LVL V: CPT | Mod: PBBFAC,,, | Performed by: PODIATRIST

## 2025-03-21 PROCEDURE — 1100F PTFALLS ASSESS-DOCD GE2>/YR: CPT | Mod: CPTII,S$GLB,, | Performed by: PODIATRIST

## 2025-03-21 PROCEDURE — 1159F MED LIST DOCD IN RCRD: CPT | Mod: CPTII,S$GLB,, | Performed by: PODIATRIST

## 2025-03-21 PROCEDURE — 1160F RVW MEDS BY RX/DR IN RCRD: CPT | Mod: CPTII,S$GLB,, | Performed by: PODIATRIST

## 2025-03-21 PROCEDURE — 1157F ADVNC CARE PLAN IN RCRD: CPT | Mod: CPTII,S$GLB,, | Performed by: PODIATRIST

## 2025-03-21 PROCEDURE — 3288F FALL RISK ASSESSMENT DOCD: CPT | Mod: CPTII,S$GLB,, | Performed by: PODIATRIST

## 2025-03-21 PROCEDURE — 73610 X-RAY EXAM OF ANKLE: CPT | Mod: 26,RT,, | Performed by: RADIOLOGY

## 2025-03-21 NOTE — PROGRESS NOTES
Subjective:     Patient ID: Brianne Martin is a 80 y.o. female.    Chief Complaint: Diabetic Foot Exam (Pt c/o right ankle pain 8/10 due to a screw in her ankle, diabetic pt wears boots, PCP Dr. Khan last seen 3-7-25) and Ankle Pain    Brianne is a 80 y.o. female who presents to the clinic for evaluation and treatment of high risk feet. Brianne has a past medical history of Arthritis (12/10/2014), Bell's palsy (02/03/2022), Elevated LFTs (2012), Mixed anxiety and depressive disorder, Osteoporosis, postmenopausal, Reflux, Traumatic closed displaced bimalleolar fracture of right ankle (12/25/2013), Type II or unspecified type diabetes mellitus without mention of complication, uncontrolled, and Vitamin D deficiency disease. The patient's chief complaint is right ankle pain points to medial and lateral ankle. This patient has documented high risk feet requiring routine maintenance secondary to diabetes mellitis and those secondary complications of diabetes, as mentioned. Patient accompanied by her daughter today.     PCP: Bird Khan MD    Date Last Seen by PCP: 03/07/2025    Current shoe gear:  Affected Foot: Casual shoes     Unaffected Foot: Casual shoes    Hemoglobin A1C   Date Value Ref Range Status   11/08/2024 6.0 (H) 4.0 - 5.6 % Final     Comment:     ADA Screening Guidelines:  5.7-6.4%  Consistent with prediabetes  >or=6.5%  Consistent with diabetes    High levels of fetal hemoglobin interfere with the HbA1C  assay. Heterozygous hemoglobin variants (HbS, HgC, etc)do  not significantly interfere with this assay.   However, presence of multiple variants may affect accuracy.     07/03/2024 9.1 (H) 4.0 - 5.6 % Final     Comment:     ADA Screening Guidelines:  5.7-6.4%  Consistent with prediabetes  >or=6.5%  Consistent with diabetes    High levels of fetal hemoglobin interfere with the HbA1C  assay. Heterozygous hemoglobin variants (HbS, HgC, etc)do  not significantly interfere with this assay.   However, presence of  multiple variants may affect accuracy.     04/03/2024 6.0 (H) 4.0 - 5.6 % Final     Comment:     ADA Screening Guidelines:  5.7-6.4%  Consistent with prediabetes  >or=6.5%  Consistent with diabetes    High levels of fetal hemoglobin interfere with the HbA1C  assay. Heterozygous hemoglobin variants (HbS, HgC, etc)do  not significantly interfere with this assay.   However, presence of multiple variants may affect accuracy.         Problem List[1]    Medication List with Changes/Refills   Current Medications    ACETAMINOPHEN (TYLENOL) 650 MG TBSR    Take 1 tablet (650 mg total) by mouth every 8 (eight) hours.    ALENDRONATE (FOSAMAX) 70 MG TABLET    Take 70 mg by mouth every 7 days.    AMLODIPINE (NORVASC) 10 MG TABLET    Take 1 tablet (10 mg total) by mouth every morning.    BLOOD SUGAR DIAGNOSTIC (TRUE METRIX GLUCOSE TEST STRIP) STRP    Use to test blood glucose three (3) times a day, to be used with insurance-preferred brand of glucometer/supplies.    BLOOD-GLUCOSE METER (ACCU-CHEK GUIDE ME GLUCOSE MTR) MISC    To check BG 3 times daily, to use with insurance preferred meter    CONJUGATED ESTROGENS (PREMARIN) VAGINAL CREAM    Apply a pea sized amount PV QHS x 2 weeks. Then, apply 3 times a week.    DULAGLUTIDE (TRULICITY) 3 MG/0.5 ML PEN INJECTOR    Inject 3 mg into the skin every 7 days.    FLUTICASONE PROPIONATE (FLONASE) 50 MCG/ACTUATION NASAL SPRAY    1 spray (50 mcg total) by Each Nostril route once daily.    GABAPENTIN (NEURONTIN) 300 MG CAPSULE    Take 1 capsule (300 mg total) by mouth 2 (two) times daily.    LANCETS (ACCU-CHEK SOFTCLIX LANCETS) MISC    Use to test blood glucose three (3) times a day, discard lancet after each use    LOSARTAN (COZAAR) 100 MG TABLET    Take 1 tablet (100 mg total) by mouth once daily. For blood pressure    MELOXICAM (MOBIC) 7.5 MG TABLET    Take 1 tablet (7.5 mg total) by mouth nightly as needed for Pain.    METFORMIN (GLUCOPHAGE) 1000 MG TABLET    Take 1 tablet (1,000 mg  total) by mouth 2 (two) times daily with meals.    MULTIVITAMIN LIQD    Take by mouth.    OLOPATADINE (PAZEO) 0.7 % DROP    Place 1 drop into both eyes every morning.    OMEPRAZOLE (PRILOSEC) 40 MG CAPSULE    Take 1 capsule (40 mg total) by mouth every morning.    ROSUVASTATIN (CRESTOR) 20 MG TABLET    Take 1 tablet (20 mg total) by mouth once daily.    TRIAMCINOLONE ACETONIDE 0.1% (KENALOG) 0.1 % CREAM    Apply topically 2 (two) times daily.    VENLAFAXINE (EFFEXOR) 75 MG TABLET    Take 1 tablet (75 mg total) by mouth 2 (two) times daily.    WHITE PETROLATUM-MINERAL OIL 57.3-42.5% (REFRESH P.M.) 57.3-42.5 % OINT    Place into the left eye every evening.       Review of patient's allergies indicates:   Allergen Reactions    Ace inhibitors      Other reaction(s): cough       Past Surgical History:   Procedure Laterality Date    APPENDECTOMY      CATARACT EXTRACTION      ou    COLONOSCOPY N/A 04/21/2021    Procedure: COLONOSCOPY;  Surgeon: Shanice Garcia MD;  [facility];  Service: Endoscopy;  Laterality: N/A;    ORIF right ankle  12/01/2013    TUBAL LIGATION         Family History   Problem Relation Name Age of Onset    Hypertension Other      Diabetes Other      Cataracts Father      No Known Problems Mother      No Known Problems Sister      No Known Problems Brother      No Known Problems Maternal Aunt      No Known Problems Maternal Uncle      No Known Problems Paternal Aunt      No Known Problems Paternal Uncle      No Known Problems Maternal Grandmother      No Known Problems Maternal Grandfather      No Known Problems Paternal Grandmother      No Known Problems Paternal Grandfather      Amblyopia Neg Hx      Blindness Neg Hx      Cancer Neg Hx      Glaucoma Neg Hx      Macular degeneration Neg Hx      Retinal detachment Neg Hx      Strabismus Neg Hx      Stroke Neg Hx      Thyroid disease Neg Hx         Social History[2]    Vitals:    03/21/25 0935   Weight: 49.4 kg (108 lb 14.5 oz)   Height: 5'  "2" (1.575 m)   PainSc:   8       Hemoglobin A1C   Date Value Ref Range Status   11/08/2024 6.0 (H) 4.0 - 5.6 % Final     Comment:     ADA Screening Guidelines:  5.7-6.4%  Consistent with prediabetes  >or=6.5%  Consistent with diabetes    High levels of fetal hemoglobin interfere with the HbA1C  assay. Heterozygous hemoglobin variants (HbS, HgC, etc)do  not significantly interfere with this assay.   However, presence of multiple variants may affect accuracy.     07/03/2024 9.1 (H) 4.0 - 5.6 % Final     Comment:     ADA Screening Guidelines:  5.7-6.4%  Consistent with prediabetes  >or=6.5%  Consistent with diabetes    High levels of fetal hemoglobin interfere with the HbA1C  assay. Heterozygous hemoglobin variants (HbS, HgC, etc)do  not significantly interfere with this assay.   However, presence of multiple variants may affect accuracy.     04/03/2024 6.0 (H) 4.0 - 5.6 % Final     Comment:     ADA Screening Guidelines:  5.7-6.4%  Consistent with prediabetes  >or=6.5%  Consistent with diabetes    High levels of fetal hemoglobin interfere with the HbA1C  assay. Heterozygous hemoglobin variants (HbS, HgC, etc)do  not significantly interfere with this assay.   However, presence of multiple variants may affect accuracy.         Review of Systems   Constitutional:  Negative for chills and fever.   Respiratory:  Negative for shortness of breath.    Cardiovascular:  Negative for chest pain, palpitations, orthopnea, claudication and leg swelling.   Gastrointestinal:  Negative for diarrhea, nausea and vomiting.   Musculoskeletal:  Negative for joint pain.   Skin:  Negative for rash.   Neurological:  Negative for dizziness, tingling, sensory change, focal weakness and weakness.   Psychiatric/Behavioral: Negative.               Objective:      PHYSICAL EXAM: Apperance: Alert and orient in no distress,well developed, and with good attention to grooming and body habits  Patient presents ambulating in boots  LOWER EXTREMITY " EXAM:  VASCULAR: Dorsalis pedis pulses 2/4 bilateral and Posterior Tibial pulses 2/4 bilateral. Capillary fill time <blanched bilateral. No edema observed bilateral. Varicosities absent bilateral. Skin temperature of the lower extremities is warm to warm, proximal to distal. Hair growth dim bilateral.  DERMATOLOGICAL: No skin rashes, subcutaneous nodules, lesions, or ulcers observed bilateral. Nails 1,2,3,4,5 bilateral normal length and thickness. Webspaces 1,2,3,4 bilateral clean, dry and without evidence of break in skin integrity.   NEUROLOGICAL: Light touch, sharp-dull, proprioception all present and equal bilaterally.  Vibratory sensation diminished at bilateral hallux. Protective sensation intact at sites as tested with a Windsor-Kaylen 5.07 monofilament.   MUSCULOSKELETAL: Muscle strength is 5/5 for foot inverters, everters, plantarflexors, and dorsiflexors. Muscle tone is normal. Pain on palpation of palpable medial malleoli screw head. Pes planus noted bilateral.         Assessment:       ICD-10-CM ICD-9-CM   1. Encounter for comprehensive diabetic foot examination, type 2 diabetes mellitus  E11.9 250.00   2. Type 2 diabetes mellitus with hyperglycemia, without long-term current use of insulin  E11.65 250.00     790.29   3. Traumatic arthritis of right ankle  M12.571 716.17   4. Painful orthopaedic hardware  T84.84XA 996.78   5. Osteoporosis, postmenopausal  M81.0 733.01       Plan:   Encounter for comprehensive diabetic foot examination, type 2 diabetes mellitus    Type 2 diabetes mellitus with hyperglycemia, without long-term current use of insulin  -     Ambulatory referral/consult to Podiatry    Traumatic arthritis of right ankle  -     X-Ray Ankle Complete Right; Future; Expected date: 03/21/2025    Painful orthopaedic hardware  -     X-Ray Ankle Complete Right; Future; Expected date: 03/21/2025    Osteoporosis, postmenopausal  -     Ambulatory referral/consult to Rheumatology; Future; Expected date:  03/28/2025    I counseled the patient on her conditions, regarding findings of my examination, my impressions, and usual treatment plan.   This visit spent on counseling and coordination of care.  Appointment spent on education about the diabetic foot, neuropathy, and prevention of limb loss.  Shoe inspection. Diabetic Foot Education. Patient reminded of the importance of good nutrition and blood sugar control to help prevent podiatric complications of diabetes. Patient instructed on proper foot hygeine. We discussed wearing proper shoe gear, daily foot inspections, never walking without protective shoe gear, never putting sharp instruments to feet.    Ordered right ankle x-rays to be completed today.   The patient and I reviewed the types of shoes she should be wearing, my recommendation includes generally the best time of the day for a shoe fitting is the afternoon, shoes with a wide toe box, very good cushion, and tennis shoes with removable inner soles.The patient and I reviewed my recommendations for over-the-counter orthotic inserts.   Referral completed rheumatology.   Patient  will continue to monitor the areas daily, inspect feet, wear protective shoe gear when ambulatory, moisturizer to maintain skin integrity. Patient reminded of the importance of good nutrition and blood sugar control to help prevent podiatric complications of diabetes.  Patient to return 4 months or sooner if needed.            Farheen Fontanez DPM  [facility]          [1]   Patient Active Problem List  Diagnosis    Type 2 diabetes mellitus with hyperglycemia, without long-term current use of insulin    Hypertension associated with diabetes    Osteoporosis, postmenopausal    Vitamin D deficiency disease    Mixed anxiety and depressive disorder    AR (allergic rhinitis)    Arthritis    GERD (gastroesophageal reflux disease)    Pseudophakia, both eyes    Refractive error    Encounter for screening colonoscopy    Medication  management    Hyperlipidemia LDL goal <70    Mobility impaired   [2]   Social History  Socioeconomic History    Marital status:     Number of children: 8   Occupational History    Occupation: Retired   Tobacco Use    Smoking status: Never     Passive exposure: Never    Smokeless tobacco: Never   Substance and Sexual Activity    Alcohol use: Yes     Comment: socially    Drug use: Never    Sexual activity: Yes     Partners: Male     Birth control/protection: Post-menopausal   Social History Narrative    1 child  at age 7 of an illness.     Social Drivers of Health     Financial Resource Strain: Low Risk  (2022)    Overall Financial Resource Strain (CARDIA)     Difficulty of Paying Living Expenses: Not very hard   Food Insecurity: No Food Insecurity (2022)    Hunger Vital Sign     Worried About Running Out of Food in the Last Year: Never true     Ran Out of Food in the Last Year: Never true   Transportation Needs: High Risk (10/25/2024)    Received from [facility] SDOH Screening     Has lack of transportation kept you from medical appointments, meetings, work or from getting things needed for daily living? choose all that apply.: Yes, it has kept me from medical appointments or from getting my medications   Physical Activity: Inactive (2022)    Exercise Vital Sign     Days of Exercise per Week: 0 days     Minutes of Exercise per Session: 0 min   Stress: Stress Concern Present (2022)    [employer] [place] - Occupational Stress Questionnaire     Feeling of Stress : To some extent   Housing Stability: Low Risk  (2022)    Housing Stability Vital Sign     Unable to Pay for Housing in the Last Year: No     Number of Places Lived in the Last Year: 2     Unstable Housing in the Last Year: No

## 2025-06-16 ENCOUNTER — TELEPHONE (OUTPATIENT)
Dept: PRIMARY CARE CLINIC | Facility: CLINIC | Age: 80
End: 2025-06-16
Payer: MEDICARE

## 2025-06-27 ENCOUNTER — OFFICE VISIT (OUTPATIENT)
Dept: PODIATRY | Facility: CLINIC | Age: 80
End: 2025-06-27
Payer: MEDICARE

## 2025-06-27 VITALS — BODY MASS INDEX: 20.05 KG/M2 | WEIGHT: 108.94 LBS | HEIGHT: 62 IN

## 2025-06-27 DIAGNOSIS — M12.571 TRAUMATIC ARTHRITIS OF RIGHT ANKLE: Primary | ICD-10-CM

## 2025-06-27 DIAGNOSIS — E11.9 TYPE 2 DIABETES MELLITUS WITHOUT COMPLICATION, WITHOUT LONG-TERM CURRENT USE OF INSULIN: ICD-10-CM

## 2025-06-27 DIAGNOSIS — T84.84XA PAINFUL ORTHOPAEDIC HARDWARE: ICD-10-CM

## 2025-06-27 PROCEDURE — 99999 PR PBB SHADOW E&M-EST. PATIENT-LVL IV: CPT | Mod: PBBFAC,,, | Performed by: PODIATRIST

## 2025-06-27 RX ORDER — GABAPENTIN 300 MG/1
300 CAPSULE ORAL 2 TIMES DAILY
Qty: 180 CAPSULE | Refills: 3 | Status: SHIPPED | OUTPATIENT
Start: 2025-06-27

## 2025-06-27 RX ORDER — LIDOCAINE 50 MG/G
1 PATCH TOPICAL DAILY
Qty: 30 PATCH | Refills: 0 | Status: SHIPPED | OUTPATIENT
Start: 2025-06-27

## 2025-06-27 NOTE — PROGRESS NOTES
Subjective:     Patient ID: Brianne Martin is a 80 y.o. female.    Chief Complaint: Diabetic Foot Exam (F/u for foot check, right foot pain, medial aspect, rates pain 8/10, caregiver states that she has some loose hardware in foot after x-ray, diabetic, wears casual shoes and socks, last seen PCP Dr. Khan on 03/07/2025)    Brianne is a 80 y.o. female who presents to the clinic for evaluation and treatment of high risk feet. Brianne has a past medical history of Arthritis (12/10/2014), Bell's palsy (02/03/2022), Elevated LFTs (2012), Mixed anxiety and depressive disorder, Osteoporosis, postmenopausal, Reflux, Traumatic closed displaced bimalleolar fracture of right ankle (12/25/2013), Type II or unspecified type diabetes mellitus without mention of complication, uncontrolled, and Vitamin D deficiency disease. The patient's chief complaint is right ankle pain points to medial and lateral ankle. This patient has documented high risk feet requiring routine maintenance secondary to diabetes mellitis and those secondary complications of diabetes, as mentioned. Patient accompanied by her daughter today.     PCP: Bird Khan MD    Date Last Seen by PCP: 03/07/2025    Current shoe gear:  Affected Foot: Casual shoes     Unaffected Foot: Casual shoes    Hemoglobin A1C   Date Value Ref Range Status   11/08/2024 6.0 (H) 4.0 - 5.6 % Final     Comment:     ADA Screening Guidelines:  5.7-6.4%  Consistent with prediabetes  >or=6.5%  Consistent with diabetes    High levels of fetal hemoglobin interfere with the HbA1C  assay. Heterozygous hemoglobin variants (HbS, HgC, etc)do  not significantly interfere with this assay.   However, presence of multiple variants may affect accuracy.     07/03/2024 9.1 (H) 4.0 - 5.6 % Final     Comment:     ADA Screening Guidelines:  5.7-6.4%  Consistent with prediabetes  >or=6.5%  Consistent with diabetes    High levels of fetal hemoglobin interfere with the HbA1C  assay. Heterozygous hemoglobin variants  (HbS, HgC, etc)do  not significantly interfere with this assay.   However, presence of multiple variants may affect accuracy.     04/03/2024 6.0 (H) 4.0 - 5.6 % Final     Comment:     ADA Screening Guidelines:  5.7-6.4%  Consistent with prediabetes  >or=6.5%  Consistent with diabetes    High levels of fetal hemoglobin interfere with the HbA1C  assay. Heterozygous hemoglobin variants (HbS, HgC, etc)do  not significantly interfere with this assay.   However, presence of multiple variants may affect accuracy.         Problem List[1]    Medication List with Changes/Refills   New Medications    LIDOCAINE (LIDODERM) 5 %    Place 1 patch onto the skin once daily. Remove & Discard patch within 12 hours or as directed by MD   Current Medications    ACETAMINOPHEN (TYLENOL) 650 MG TBSR    Take 1 tablet (650 mg total) by mouth every 8 (eight) hours.    ALENDRONATE (FOSAMAX) 70 MG TABLET    Take 70 mg by mouth every 7 days.    AMLODIPINE (NORVASC) 10 MG TABLET    Take 1 tablet (10 mg total) by mouth every morning.    BLOOD SUGAR DIAGNOSTIC (TRUE METRIX GLUCOSE TEST STRIP) STRP    Use to test blood glucose three (3) times a day, to be used with insurance-preferred brand of glucometer/supplies.    BLOOD-GLUCOSE METER (ACCU-CHEK GUIDE ME GLUCOSE MTR) MISC    To check BG 3 times daily, to use with insurance preferred meter    CONJUGATED ESTROGENS (PREMARIN) VAGINAL CREAM    Apply a pea sized amount PV QHS x 2 weeks. Then, apply 3 times a week.    DULAGLUTIDE (TRULICITY) 3 MG/0.5 ML PEN INJECTOR    Inject 3 mg into the skin every 7 days.    FLUTICASONE PROPIONATE (FLONASE) 50 MCG/ACTUATION NASAL SPRAY    1 spray (50 mcg total) by Each Nostril route once daily.    LANCETS (ACCU-CHEK SOFTCLIX LANCETS) MISC    Use to test blood glucose three (3) times a day, discard lancet after each use    LOSARTAN (COZAAR) 100 MG TABLET    Take 1 tablet (100 mg total) by mouth once daily. For blood pressure    MELOXICAM (MOBIC) 7.5 MG TABLET    Take 1  tablet (7.5 mg total) by mouth nightly as needed for Pain.    METFORMIN (GLUCOPHAGE) 1000 MG TABLET    Take 1 tablet (1,000 mg total) by mouth 2 (two) times daily with meals.    MULTIVITAMIN LIQD    Take by mouth.    OLOPATADINE (PAZEO) 0.7 % DROP    Place 1 drop into both eyes every morning.    OMEPRAZOLE (PRILOSEC) 40 MG CAPSULE    Take 1 capsule (40 mg total) by mouth every morning.    ROSUVASTATIN (CRESTOR) 20 MG TABLET    Take 1 tablet (20 mg total) by mouth once daily.    TRIAMCINOLONE ACETONIDE 0.1% (KENALOG) 0.1 % CREAM    Apply topically 2 (two) times daily.    VENLAFAXINE (EFFEXOR) 75 MG TABLET    Take 1 tablet (75 mg total) by mouth 2 (two) times daily.    WHITE PETROLATUM-MINERAL OIL 57.3-42.5% (REFRESH P.M.) 57.3-42.5 % OINT    Place into the left eye every evening.   Changed and/or Refilled Medications    Modified Medication Previous Medication    GABAPENTIN (NEURONTIN) 300 MG CAPSULE gabapentin (NEURONTIN) 300 MG capsule       Take 1 capsule (300 mg total) by mouth 2 (two) times daily.    Take 1 capsule (300 mg total) by mouth 2 (two) times daily.       Review of patient's allergies indicates:   Allergen Reactions    Ace inhibitors      Other reaction(s): cough       Past Surgical History:   Procedure Laterality Date    APPENDECTOMY      CATARACT EXTRACTION      ou    COLONOSCOPY N/A 04/21/2021    Procedure: COLONOSCOPY;  Surgeon: Shanice Garcia MD;  Location: [facility];  Service: Endoscopy;  Laterality: N/A;    ORIF right ankle  12/01/2013    TUBAL LIGATION         Family History   Problem Relation Name Age of Onset    Hypertension Other      Diabetes Other      Cataracts Father      No Known Problems Mother      No Known Problems Sister      No Known Problems Brother      No Known Problems Maternal Aunt      No Known Problems Maternal Uncle      No Known Problems Paternal Aunt      No Known Problems Paternal Uncle      No Known Problems Maternal Grandmother      No Known Problems Maternal  "Grandfather      No Known Problems Paternal Grandmother      No Known Problems Paternal Grandfather      Amblyopia Neg Hx      Blindness Neg Hx      Cancer Neg Hx      Glaucoma Neg Hx      Macular degeneration Neg Hx      Retinal detachment Neg Hx      Strabismus Neg Hx      Stroke Neg Hx      Thyroid disease Neg Hx         Social History[2]    Vitals:    06/27/25 0929   Weight: 49.4 kg (108 lb 14.5 oz)   Height: 5' 2" (1.575 m)   PainSc:   8       Hemoglobin A1C   Date Value Ref Range Status   11/08/2024 6.0 (H) 4.0 - 5.6 % Final     Comment:     ADA Screening Guidelines:  5.7-6.4%  Consistent with prediabetes  >or=6.5%  Consistent with diabetes    High levels of fetal hemoglobin interfere with the HbA1C  assay. Heterozygous hemoglobin variants (HbS, HgC, etc)do  not significantly interfere with this assay.   However, presence of multiple variants may affect accuracy.     07/03/2024 9.1 (H) 4.0 - 5.6 % Final     Comment:     ADA Screening Guidelines:  5.7-6.4%  Consistent with prediabetes  >or=6.5%  Consistent with diabetes    High levels of fetal hemoglobin interfere with the HbA1C  assay. Heterozygous hemoglobin variants (HbS, HgC, etc)do  not significantly interfere with this assay.   However, presence of multiple variants may affect accuracy.     04/03/2024 6.0 (H) 4.0 - 5.6 % Final     Comment:     ADA Screening Guidelines:  5.7-6.4%  Consistent with prediabetes  >or=6.5%  Consistent with diabetes    High levels of fetal hemoglobin interfere with the HbA1C  assay. Heterozygous hemoglobin variants (HbS, HgC, etc)do  not significantly interfere with this assay.   However, presence of multiple variants may affect accuracy.         Review of Systems   Constitutional:  Negative for chills and fever.   Respiratory:  Negative for shortness of breath.    Cardiovascular:  Negative for chest pain, palpitations, orthopnea, claudication and leg swelling.   Gastrointestinal:  Negative for diarrhea, nausea and vomiting. "   Musculoskeletal:  Negative for joint pain.   Skin:  Negative for rash.   Neurological:  Negative for dizziness, tingling, sensory change, focal weakness and weakness.   Psychiatric/Behavioral: Negative.               Objective:      PHYSICAL EXAM: Apperance: Alert and orient in no distress,well developed, and with good attention to grooming and body habits  Patient presents ambulating in boots  LOWER EXTREMITY EXAM:  VASCULAR: Dorsalis pedis pulses 2/4 bilateral and Posterior Tibial pulses 2/4 bilateral. Capillary fill time <blanched bilateral. No edema observed bilateral. Varicosities absent bilateral. Skin temperature of the lower extremities is warm to warm, proximal to distal. Hair growth dim bilateral.  DERMATOLOGICAL: No skin rashes, subcutaneous nodules, lesions, or ulcers observed bilateral. Nails 1,2,3,4,5 bilateral normal length and thickness. Webspaces 1,2,3,4 bilateral clean, dry and without evidence of break in skin integrity.   NEUROLOGICAL: Light touch, sharp-dull, proprioception all present and equal bilaterally.  Vibratory sensation diminished at bilateral hallux. Protective sensation intact at sites as tested with a Crystal-Kaylen 5.07 monofilament.   MUSCULOSKELETAL: Muscle strength is 5/5 for foot inverters, everters, plantarflexors, and dorsiflexors. Muscle tone is normal. Pain on palpation of palpable medial malleoli screw head. Pes planus noted bilateral.     TEST RESULTS: Radiographs of right foot/ankle taken reveals bilateral malleolar repair without evidence of hardware failure complication. Ankle mortise appears maintained           Assessment:       ICD-10-CM ICD-9-CM   1. Traumatic arthritis of right ankle  M12.571 716.17   2. Type 2 diabetes mellitus without complication, without long-term current use of insulin  E11.9 250.00   3. Painful orthopaedic hardware  T84.84XA 996.78         Plan:   Traumatic arthritis of right ankle  -     gabapentin (NEURONTIN) 300 MG capsule; Take 1  capsule (300 mg total) by mouth 2 (two) times daily.  Dispense: 180 capsule; Refill: 3  -     LIDOcaine (LIDODERM) 5 %; Place 1 patch onto the skin once daily. Remove & Discard patch within 12 hours or as directed by MD  Dispense: 30 patch; Refill: 0    Type 2 diabetes mellitus without complication, without long-term current use of insulin  -     gabapentin (NEURONTIN) 300 MG capsule; Take 1 capsule (300 mg total) by mouth 2 (two) times daily.  Dispense: 180 capsule; Refill: 3    Painful orthopaedic hardware  -     gabapentin (NEURONTIN) 300 MG capsule; Take 1 capsule (300 mg total) by mouth 2 (two) times daily.  Dispense: 180 capsule; Refill: 3  -     LIDOcaine (LIDODERM) 5 %; Place 1 patch onto the skin once daily. Remove & Discard patch within 12 hours or as directed by MD  Dispense: 30 patch; Refill: 0    I counseled the patient on her conditions, regarding findings of my examination, my impressions, and usual treatment plan.   Appointment spent on education about the diabetic foot, neuropathy, and prevention of limb loss.  Shoe inspection. Diabetic Foot Education. Patient reminded of the importance of good nutrition and blood sugar control to help prevent podiatric complications of diabetes. Patient instructed on proper foot hygeine. We discussed wearing proper shoe gear, daily foot inspections, never walking without protective shoe gear, never putting sharp instruments to feet.    Reviewed right ankle x-rays in exam room with patient.    Refill completed for Gabapentin to be taken as needed at night.   Prescription written for topical Lidocaine patch as needed.   The patient and I reviewed the types of shoes she should be wearing, my recommendation includes generally the best time of the day for a shoe fitting is the afternoon, shoes with a wide toe box, very good cushion, and tennis shoes with removable inner soles.The patient and I reviewed my recommendations for over-the-counter orthotic inserts.   Patient   will continue to monitor the areas daily, inspect feet, wear protective shoe gear when ambulatory, moisturizer to maintain skin integrity. Patient reminded of the importance of good nutrition and blood sugar control to help prevent podiatric complications of diabetes.  Patient to return 6 months or sooner if needed.            Farheen Fontanez DPM  [facility]            [1]   Patient Active Problem List  Diagnosis    Type 2 diabetes mellitus with hyperglycemia, without long-term current use of insulin    Hypertension associated with diabetes    Osteoporosis, postmenopausal    Vitamin D deficiency disease    Mixed anxiety and depressive disorder    AR (allergic rhinitis)    Arthritis    GERD (gastroesophageal reflux disease)    Pseudophakia, both eyes    Refractive error    Encounter for screening colonoscopy    Medication management    Hyperlipidemia LDL goal <70    Mobility impaired   [2]   Social History  Socioeconomic History    Marital status:     Number of children: 8   Occupational History    Occupation: Retired   Tobacco Use    Smoking status: Never     Passive exposure: Never    Smokeless tobacco: Never   Substance and Sexual Activity    Alcohol use: Yes     Comment: socially    Drug use: Never    Sexual activity: Yes     Partners: Male     Birth control/protection: Post-menopausal   Social History Narrative    1 child  at age 7 of an illness.     Social Drivers of Health     Financial Resource Strain: Low Risk  (2022)    Overall Financial Resource Strain (CARDIA)     Difficulty of Paying Living Expenses: Not very hard   Food Insecurity: No Food Insecurity (2022)    Hunger Vital Sign     Worried About Running Out of Food in the Last Year: Never true     Ran Out of Food in the Last Year: Never true   Transportation Needs: High Risk (10/25/2024)    Received from [facility] SDOH Screening     Has lack of transportation kept you from medical appointments, meetings, work or  from getting things needed for daily living? choose all that apply.: Yes, it has kept me from medical appointments or from getting my medications   Physical Activity: Inactive (2/1/2022)    Exercise Vital Sign     Days of Exercise per Week: 0 days     Minutes of Exercise per Session: 0 min   Stress: Stress Concern Present (2/1/2022)    [employer] [place] [employer] - Occupational Stress Questionnaire     Feeling of Stress : To some extent   Housing Stability: Low Risk  (2/1/2022)    Housing Stability Vital Sign     Unable to Pay for Housing in the Last Year: No     Number of Places Lived in the Last Year: 2     Unstable Housing in the Last Year: No

## 2025-07-03 DIAGNOSIS — E11.59 HYPERTENSION ASSOCIATED WITH DIABETES: ICD-10-CM

## 2025-07-03 DIAGNOSIS — I15.2 HYPERTENSION ASSOCIATED WITH DIABETES: ICD-10-CM

## 2025-07-03 RX ORDER — AMLODIPINE BESYLATE 10 MG/1
10 TABLET ORAL EVERY MORNING
Qty: 90 TABLET | Refills: 1 | Status: SHIPPED | OUTPATIENT
Start: 2025-07-03 | End: 2025-12-30

## 2025-07-07 DIAGNOSIS — E11.59 HYPERTENSION ASSOCIATED WITH DIABETES: ICD-10-CM

## 2025-07-07 DIAGNOSIS — I15.2 HYPERTENSION ASSOCIATED WITH DIABETES: ICD-10-CM

## 2025-07-07 RX ORDER — AMLODIPINE BESYLATE 10 MG/1
10 TABLET ORAL EVERY MORNING
Qty: 90 TABLET | Refills: 1 | OUTPATIENT
Start: 2025-07-07 | End: 2026-01-03

## 2025-07-18 ENCOUNTER — OFFICE VISIT (OUTPATIENT)
Dept: OPHTHALMOLOGY | Facility: CLINIC | Age: 80
End: 2025-07-18
Payer: MEDICARE

## 2025-07-18 DIAGNOSIS — E11.65 TYPE 2 DIABETES MELLITUS WITH HYPERGLYCEMIA, WITHOUT LONG-TERM CURRENT USE OF INSULIN: ICD-10-CM

## 2025-07-18 DIAGNOSIS — H40.023 OAG (OPEN ANGLE GLAUCOMA) SUSPECT, HIGH RISK, BILATERAL: ICD-10-CM

## 2025-07-18 DIAGNOSIS — E11.3293 MILD NONPROLIFERATIVE DIABETIC RETINOPATHY OF BOTH EYES WITHOUT MACULAR EDEMA ASSOCIATED WITH TYPE 2 DIABETES MELLITUS: Primary | ICD-10-CM

## 2025-07-18 DIAGNOSIS — Z96.1 PSEUDOPHAKIA, BOTH EYES: ICD-10-CM

## 2025-07-18 PROCEDURE — 99999 PR PBB SHADOW E&M-EST. PATIENT-LVL II: CPT | Mod: PBBFAC,,, | Performed by: OPHTHALMOLOGY

## 2025-07-18 NOTE — PROGRESS NOTES
HPI     Diabetes     Additional comments: A1C: 6.0 BSL:100 Pt states vision OU started   fluctuating  2 years ago. Pt daughter geovany her mother sees PCP every   3mos for monitoring uses metformin, trulicity to control symptoms           Comments    1. COAG  31/29 Mild  2. DM no retinopathy  3. PCIOL       Vision changes since last eye exam?: Yes. Pt states that she has noticed   the vision in her left eye has been becoming more blurry and weak compared   to her right eye per pt. Pt has been using her eyeglasses PRN for reading.   Pt has been compliant with Latanoprost drops.     Any eye pain today: No.    Other ocular symptoms: Pt has been experiencing floaters OS.     Interested in contact lens fitting today? No.             Last edited by Ronda Frias on 7/18/2025 10:10 AM.            Assessment /Plan     For exam results, see Encounter Report.    Mild nonproliferative diabetic retinopathy of both eyes without macular edema associated with type 2 diabetes mellitus  Retinopathy seen on exam. Last A1c 6 . Strict BG control, f/u w/ PCP. Stressed importance of DM control to preserve vision. Follow up in 1 year with mOCT, fundus photos.    OAG (open angle glaucoma) suspect, high risk, bilateral  No evidence of glaucoma at this time but based on risk factors recommend to continue monitoring.    Pseudophakia, both eyes  Condition stable, no therapeutic intervention necessary at this time. Will continue to monitor.      Return to clinic in 1 year with GOCT and DFE or sooner PRN

## 2025-08-01 DIAGNOSIS — E11.9 TYPE 2 DIABETES MELLITUS WITHOUT COMPLICATION, WITHOUT LONG-TERM CURRENT USE OF INSULIN: ICD-10-CM

## 2025-08-01 DIAGNOSIS — I15.2 HYPERTENSION ASSOCIATED WITH DIABETES: ICD-10-CM

## 2025-08-01 DIAGNOSIS — E11.59 HYPERTENSION ASSOCIATED WITH DIABETES: ICD-10-CM

## 2025-08-01 RX ORDER — AMLODIPINE BESYLATE 10 MG/1
10 TABLET ORAL EVERY MORNING
Qty: 90 TABLET | Refills: 1 | Status: SHIPPED | OUTPATIENT
Start: 2025-08-01 | End: 2026-01-28

## 2025-08-01 RX ORDER — LOSARTAN POTASSIUM 100 MG/1
100 TABLET ORAL DAILY
Qty: 90 TABLET | Refills: 3 | Status: SHIPPED | OUTPATIENT
Start: 2025-08-01

## 2025-08-01 RX ORDER — METFORMIN HYDROCHLORIDE 1000 MG/1
1000 TABLET ORAL 2 TIMES DAILY WITH MEALS
Qty: 180 TABLET | Refills: 1 | Status: SHIPPED | OUTPATIENT
Start: 2025-08-01

## 2025-08-15 ENCOUNTER — OFFICE VISIT (OUTPATIENT)
Dept: PRIMARY CARE CLINIC | Facility: CLINIC | Age: 80
End: 2025-08-15
Payer: MEDICARE

## 2025-08-15 ENCOUNTER — PATIENT MESSAGE (OUTPATIENT)
Dept: ADMINISTRATIVE | Facility: HOSPITAL | Age: 80
End: 2025-08-15
Payer: MEDICARE

## 2025-08-15 VITALS
OXYGEN SATURATION: 100 % | WEIGHT: 98.75 LBS | HEART RATE: 80 BPM | SYSTOLIC BLOOD PRESSURE: 132 MMHG | DIASTOLIC BLOOD PRESSURE: 60 MMHG | BODY MASS INDEX: 18.17 KG/M2 | TEMPERATURE: 98 F | HEIGHT: 62 IN

## 2025-08-15 DIAGNOSIS — R63.4 UNINTENTIONAL WEIGHT LOSS: ICD-10-CM

## 2025-08-15 DIAGNOSIS — Z12.31 SCREENING MAMMOGRAM FOR BREAST CANCER: ICD-10-CM

## 2025-08-15 DIAGNOSIS — Z13.820 ENCOUNTER FOR OSTEOPOROSIS SCREENING IN ASYMPTOMATIC POSTMENOPAUSAL PATIENT: ICD-10-CM
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
## 2025-08-15 DIAGNOSIS — Z78.0 ENCOUNTER FOR OSTEOPOROSIS SCREENING IN ASYMPTOMATIC POSTMENOPAUSAL PATIENT: ICD-10-CM

## 2025-08-15 DIAGNOSIS — E11.65 TYPE 2 DIABETES MELLITUS WITH HYPERGLYCEMIA, WITHOUT LONG-TERM CURRENT USE OF INSULIN: ICD-10-CM

## 2025-08-15 DIAGNOSIS — R54 FRAIL ELDERLY: ICD-10-CM

## 2025-08-15 DIAGNOSIS — Z71.89 ACP (ADVANCE CARE PLANNING): ICD-10-CM

## 2025-08-15 DIAGNOSIS — M81.0 OSTEOPOROSIS, POSTMENOPAUSAL: ICD-10-CM

## 2025-08-15 DIAGNOSIS — F41.8 MIXED ANXIETY AND DEPRESSIVE DISORDER: ICD-10-CM

## 2025-08-15 DIAGNOSIS — M79.604 PAIN OF RIGHT LOWER EXTREMITY: ICD-10-CM

## 2025-08-15 DIAGNOSIS — I15.2 HYPERTENSION ASSOCIATED WITH DIABETES: Primary | ICD-10-CM

## 2025-08-15 DIAGNOSIS — E11.59 HYPERTENSION ASSOCIATED WITH DIABETES: Primary | ICD-10-CM

## 2025-08-15 LAB
ALBUMIN SERPL BCP-MCNC: 4.3 G/DL (ref 3.5–5.2)
ALP SERPL-CCNC: 85 UNIT/L (ref 40–150)
ALT SERPL W/O P-5'-P-CCNC: 67 UNIT/L (ref 0–55)
ANION GAP (OHS): 10 MMOL/L (ref 8–16)
AST SERPL-CCNC: 69 UNIT/L (ref 0–50)
BILIRUB SERPL-MCNC: 0.3 MG/DL (ref 0.1–1)
BUN SERPL-MCNC: 11 MG/DL (ref 8–23)
CALCIUM SERPL-MCNC: 9.3 MG/DL (ref 8.7–10.5)
CHLORIDE SERPL-SCNC: 107 MMOL/L (ref 95–110)
CHOLEST SERPL-MCNC: 134 MG/DL (ref 120–199)
CHOLEST/HDLC SERPL: 2.6 {RATIO} (ref 2–5)
CO2 SERPL-SCNC: 25 MMOL/L (ref 23–29)
CREAT SERPL-MCNC: 0.9 MG/DL (ref 0.5–1.4)
EAG (OHS): 120 MG/DL (ref 68–131)
GFR SERPLBLD CREATININE-BSD FMLA CKD-EPI: >60 ML/MIN/1.73/M2
GLUCOSE SERPL-MCNC: 80 MG/DL (ref 70–110)
HBA1C MFR BLD: 5.8 % (ref 4–5.6)
HDLC SERPL-MCNC: 52 MG/DL (ref 40–75)
HDLC SERPL: 38.8 % (ref 20–50)
LDLC SERPL CALC-MCNC: 64.8 MG/DL (ref 63–159)
NONHDLC SERPL-MCNC: 82 MG/DL
POTASSIUM SERPL-SCNC: 4.5 MMOL/L (ref 3.5–5.1)
PROT SERPL-MCNC: 7.3 GM/DL (ref 6–8.4)
SODIUM SERPL-SCNC: 142 MMOL/L (ref 136–145)
TRIGL SERPL-MCNC: 86 MG/DL (ref 30–150)

## 2025-08-15 PROCEDURE — 83036 HEMOGLOBIN GLYCOSYLATED A1C: CPT | Performed by: FAMILY MEDICINE

## 2025-08-15 PROCEDURE — 84443 ASSAY THYROID STIM HORMONE: CPT | Performed by: FAMILY MEDICINE

## 2025-08-15 PROCEDURE — 82306 VITAMIN D 25 HYDROXY: CPT | Performed by: FAMILY MEDICINE

## 2025-08-15 PROCEDURE — 80053 COMPREHEN METABOLIC PANEL: CPT | Performed by: FAMILY MEDICINE

## 2025-08-15 PROCEDURE — 85027 COMPLETE CBC AUTOMATED: CPT | Performed by: FAMILY MEDICINE

## 2025-08-15 PROCEDURE — 83718 ASSAY OF LIPOPROTEIN: CPT | Performed by: FAMILY MEDICINE

## 2025-08-15 PROCEDURE — 99999 PR PBB SHADOW E&M-EST. PATIENT-LVL V: CPT | Mod: PBBFAC,,, | Performed by: FAMILY MEDICINE

## 2025-08-15 PROCEDURE — 82570 ASSAY OF URINE CREATININE: CPT | Performed by: FAMILY MEDICINE

## 2025-08-16 LAB
25(OH)D3+25(OH)D2 SERPL-MCNC: 59 NG/ML (ref 30–96)
ALBUMIN/CREAT UR: 58.3 UG/MG
CREAT UR-MCNC: 84 MG/DL (ref 15–325)
ERYTHROCYTE [DISTWIDTH] IN BLOOD BY AUTOMATED COUNT: 12.8 % (ref 11.5–14.5)
HCT VFR BLD AUTO: 34.6 % (ref 37–48.5)
HGB BLD-MCNC: 11.4 GM/DL (ref 12–16)
MCH RBC QN AUTO: 30.3 PG (ref 27–31)
MCHC RBC AUTO-ENTMCNC: 32.9 G/DL (ref 32–36)
MCV RBC AUTO: 92 FL (ref 82–98)
MICROALBUMIN UR-MCNC: 49 UG/ML (ref ?–5000)
PLATELET # BLD AUTO: 189 K/UL (ref 150–450)
PMV BLD AUTO: 11.6 FL (ref 9.2–12.9)
RBC # BLD AUTO: 3.76 M/UL (ref 4–5.4)
TSH SERPL-ACNC: 1.89 UIU/ML (ref 0.4–4)
WBC # BLD AUTO: 5.08 K/UL (ref 3.9–12.7)